# Patient Record
Sex: FEMALE | Race: WHITE | NOT HISPANIC OR LATINO | Employment: FULL TIME | URBAN - METROPOLITAN AREA
[De-identification: names, ages, dates, MRNs, and addresses within clinical notes are randomized per-mention and may not be internally consistent; named-entity substitution may affect disease eponyms.]

---

## 2018-06-27 ENCOUNTER — TRANSCRIBE ORDERS (OUTPATIENT)
Dept: ADMINISTRATIVE | Facility: HOSPITAL | Age: 38
End: 2018-06-27

## 2018-06-27 DIAGNOSIS — M54.5 LOW BACK PAIN, UNSPECIFIED BACK PAIN LATERALITY, UNSPECIFIED CHRONICITY, WITH SCIATICA PRESENCE UNSPECIFIED: Primary | ICD-10-CM

## 2018-07-02 ENCOUNTER — HOSPITAL ENCOUNTER (OUTPATIENT)
Dept: MRI IMAGING | Facility: HOSPITAL | Age: 38
Discharge: HOME | End: 2018-07-02
Admitting: EMERGENCY MEDICINE

## 2018-07-02 DIAGNOSIS — M54.5 LOW BACK PAIN, UNSPECIFIED BACK PAIN LATERALITY, UNSPECIFIED CHRONICITY, WITH SCIATICA PRESENCE UNSPECIFIED: ICD-10-CM

## 2018-07-02 PROCEDURE — 72148 MRI LUMBAR SPINE W/O DYE: CPT

## 2020-01-25 ENCOUNTER — APPOINTMENT (OUTPATIENT)
Dept: GENERAL RADIOLOGY | Facility: HOSPITAL | Age: 40
End: 2020-01-25

## 2020-01-25 ENCOUNTER — HOSPITAL ENCOUNTER (EMERGENCY)
Facility: HOSPITAL | Age: 40
Discharge: HOME | End: 2020-01-25
Attending: EMERGENCY MEDICINE | Admitting: EMERGENCY MEDICINE

## 2020-01-25 VITALS
SYSTOLIC BLOOD PRESSURE: 132 MMHG | HEART RATE: 69 BPM | WEIGHT: 293 LBS | HEIGHT: 64 IN | TEMPERATURE: 99.3 F | BODY MASS INDEX: 50.02 KG/M2 | DIASTOLIC BLOOD PRESSURE: 89 MMHG | RESPIRATION RATE: 17 BRPM | OXYGEN SATURATION: 99 %

## 2020-01-25 DIAGNOSIS — E03.9 HYPOTHYROIDISM, UNSPECIFIED TYPE: ICD-10-CM

## 2020-01-25 DIAGNOSIS — R00.2 HEART PALPITATIONS: Primary | ICD-10-CM

## 2020-01-25 LAB
ALBUMIN SERPL-MCNC: 4.1 G/DL (ref 3.5–5.2)
ALBUMIN/GLOB SERPL: 1.4 G/DL
ALP SERPL-CCNC: 91 U/L (ref 39–117)
ALT SERPL W P-5'-P-CCNC: 14 U/L (ref 1–33)
ANION GAP SERPL CALCULATED.3IONS-SCNC: 12 MMOL/L (ref 5–15)
AST SERPL-CCNC: 17 U/L (ref 1–32)
BASOPHILS # BLD AUTO: 0.08 10*3/MM3 (ref 0–0.2)
BASOPHILS NFR BLD AUTO: 0.8 % (ref 0–1.5)
BILIRUB SERPL-MCNC: 0.2 MG/DL (ref 0.2–1.2)
BUN BLD-MCNC: 11 MG/DL (ref 6–20)
BUN/CREAT SERPL: 18.3 (ref 7–25)
CALCIUM SPEC-SCNC: 9 MG/DL (ref 8.6–10.5)
CHLORIDE SERPL-SCNC: 104 MMOL/L (ref 98–107)
CO2 SERPL-SCNC: 23 MMOL/L (ref 22–29)
CREAT BLD-MCNC: 0.6 MG/DL (ref 0.57–1)
DEPRECATED RDW RBC AUTO: 50.8 FL (ref 37–54)
EOSINOPHIL # BLD AUTO: 0.12 10*3/MM3 (ref 0–0.4)
EOSINOPHIL NFR BLD AUTO: 1.1 % (ref 0.3–6.2)
ERYTHROCYTE [DISTWIDTH] IN BLOOD BY AUTOMATED COUNT: 14.6 % (ref 12.3–15.4)
GFR SERPL CREATININE-BSD FRML MDRD: 110 ML/MIN/1.73
GLOBULIN UR ELPH-MCNC: 2.9 GM/DL
GLUCOSE BLD-MCNC: 87 MG/DL (ref 65–99)
HCT VFR BLD AUTO: 39.7 % (ref 34–46.6)
HGB BLD-MCNC: 12.4 G/DL (ref 12–15.9)
HOLD SPECIMEN: NORMAL
HOLD SPECIMEN: NORMAL
IMM GRANULOCYTES # BLD AUTO: 0.04 10*3/MM3 (ref 0–0.05)
IMM GRANULOCYTES NFR BLD AUTO: 0.4 % (ref 0–0.5)
LYMPHOCYTES # BLD AUTO: 3.28 10*3/MM3 (ref 0.7–3.1)
LYMPHOCYTES NFR BLD AUTO: 31 % (ref 19.6–45.3)
MAGNESIUM SERPL-MCNC: 2.1 MG/DL (ref 1.6–2.6)
MCH RBC QN AUTO: 29.7 PG (ref 26.6–33)
MCHC RBC AUTO-ENTMCNC: 31.2 G/DL (ref 31.5–35.7)
MCV RBC AUTO: 95.2 FL (ref 79–97)
MONOCYTES # BLD AUTO: 0.75 10*3/MM3 (ref 0.1–0.9)
MONOCYTES NFR BLD AUTO: 7.1 % (ref 5–12)
NEUTROPHILS # BLD AUTO: 6.3 10*3/MM3 (ref 1.7–7)
NEUTROPHILS NFR BLD AUTO: 59.6 % (ref 42.7–76)
NRBC BLD AUTO-RTO: 0 /100 WBC (ref 0–0.2)
NT-PROBNP SERPL-MCNC: 46.2 PG/ML (ref 5–450)
PLATELET # BLD AUTO: 361 10*3/MM3 (ref 140–450)
PMV BLD AUTO: 9.9 FL (ref 6–12)
POTASSIUM BLD-SCNC: 4.4 MMOL/L (ref 3.5–5.2)
PROT SERPL-MCNC: 7 G/DL (ref 6–8.5)
RBC # BLD AUTO: 4.17 10*6/MM3 (ref 3.77–5.28)
SODIUM BLD-SCNC: 139 MMOL/L (ref 136–145)
T4 FREE SERPL-MCNC: 1.16 NG/DL (ref 0.93–1.7)
TROPONIN T SERPL-MCNC: NORMAL
TSH SERPL DL<=0.05 MIU/L-ACNC: 6.94 UIU/ML (ref 0.27–4.2)
WBC NRBC COR # BLD: 10.57 10*3/MM3 (ref 3.4–10.8)
WHOLE BLOOD HOLD SPECIMEN: NORMAL
WHOLE BLOOD HOLD SPECIMEN: NORMAL

## 2020-01-25 PROCEDURE — 71045 X-RAY EXAM CHEST 1 VIEW: CPT

## 2020-01-25 PROCEDURE — 93005 ELECTROCARDIOGRAM TRACING: CPT

## 2020-01-25 PROCEDURE — 93005 ELECTROCARDIOGRAM TRACING: CPT | Performed by: EMERGENCY MEDICINE

## 2020-01-25 PROCEDURE — 84443 ASSAY THYROID STIM HORMONE: CPT | Performed by: EMERGENCY MEDICINE

## 2020-01-25 PROCEDURE — 83880 ASSAY OF NATRIURETIC PEPTIDE: CPT | Performed by: EMERGENCY MEDICINE

## 2020-01-25 PROCEDURE — 83735 ASSAY OF MAGNESIUM: CPT | Performed by: EMERGENCY MEDICINE

## 2020-01-25 PROCEDURE — 99284 EMERGENCY DEPT VISIT MOD MDM: CPT

## 2020-01-25 PROCEDURE — 85025 COMPLETE CBC W/AUTO DIFF WBC: CPT | Performed by: EMERGENCY MEDICINE

## 2020-01-25 PROCEDURE — 80053 COMPREHEN METABOLIC PANEL: CPT | Performed by: EMERGENCY MEDICINE

## 2020-01-25 PROCEDURE — 84484 ASSAY OF TROPONIN QUANT: CPT | Performed by: EMERGENCY MEDICINE

## 2020-01-25 PROCEDURE — 84439 ASSAY OF FREE THYROXINE: CPT | Performed by: NURSE PRACTITIONER

## 2020-01-25 RX ORDER — SODIUM CHLORIDE 0.9 % (FLUSH) 0.9 %
10 SYRINGE (ML) INJECTION AS NEEDED
Status: DISCONTINUED | OUTPATIENT
Start: 2020-01-25 | End: 2020-01-25 | Stop reason: HOSPADM

## 2020-01-28 ENCOUNTER — HOSPITAL ENCOUNTER (OUTPATIENT)
Dept: CARDIOLOGY | Facility: HOSPITAL | Age: 40
Discharge: HOME | End: 2020-01-28
Admitting: NURSE PRACTITIONER

## 2020-01-28 ENCOUNTER — OFFICE VISIT (OUTPATIENT)
Dept: CARDIOLOGY | Facility: HOSPITAL | Age: 40
End: 2020-01-28

## 2020-01-28 VITALS
BODY MASS INDEX: 49.85 KG/M2 | TEMPERATURE: 97.9 F | DIASTOLIC BLOOD PRESSURE: 86 MMHG | WEIGHT: 292 LBS | HEART RATE: 85 BPM | HEIGHT: 64 IN | RESPIRATION RATE: 18 BRPM | SYSTOLIC BLOOD PRESSURE: 146 MMHG | OXYGEN SATURATION: 9 %

## 2020-01-28 DIAGNOSIS — R06.83 SNORING: ICD-10-CM

## 2020-01-28 DIAGNOSIS — R03.0 ELEVATED BLOOD PRESSURE READING: ICD-10-CM

## 2020-01-28 DIAGNOSIS — R00.2 PALPITATIONS: ICD-10-CM

## 2020-01-28 DIAGNOSIS — K21.9 GERD WITHOUT ESOPHAGITIS: ICD-10-CM

## 2020-01-28 DIAGNOSIS — E66.01 MORBID OBESITY WITH BMI OF 50.0-59.9, ADULT (HCC): ICD-10-CM

## 2020-01-28 DIAGNOSIS — E03.9 HYPOTHYROIDISM (ACQUIRED): ICD-10-CM

## 2020-01-28 DIAGNOSIS — M25.512 LEFT SHOULDER PAIN, UNSPECIFIED CHRONICITY: ICD-10-CM

## 2020-01-28 DIAGNOSIS — R06.09 DYSPNEA ON EXERTION: ICD-10-CM

## 2020-01-28 PROCEDURE — 99214 OFFICE O/P EST MOD 30 MIN: CPT | Performed by: NURSE PRACTITIONER

## 2020-01-28 PROCEDURE — 0296T HC EXT ECG > 48HR TO 21 DAY RCRD W/CONECT INTL RCRD: CPT

## 2020-01-28 RX ORDER — NAPROXEN 500 MG/1
500 TABLET ORAL 2 TIMES DAILY WITH MEALS
COMMUNITY

## 2020-01-28 NOTE — PROGRESS NOTES
[facility]  Heart and Valve Center      Encounter Date:01/28/2020     Delmi Glasgow  [address] [address]  [unfilled]    1980    Katie Hay MD    Delmi Glasgow is a 39 y.o. female.      Subjective:     Chief Complaint:  Palpitations (left shoulder pain, WYLIE) and Establish Care       HPI     39-year-old female presented to [facility] on 1/25/2020 with complaints of palpitations.   Intermittent palps for 4 months.  Duration seconds.   Patient also noted upper left back pain/shoulder blade, dyspnea, nausea.Worse with exertion.  On day of ED visit her palps has worsened with fatigue.   Past medical history includes hypothyroidism, depression, hyperlipidemia,  Morbid obesity with a BMI 50, GERD.  EKG with normal sinus rhythm.  Palpitations and shoulder pain has been worse with activity, increase dyspnea.  Reports had been of synthroid, but was restarted 3 months. Out of med for 1 week.  3-4 sweet tea.  Hx snoring.  Occasional morning HA.  Denies dizziness, syncope, near syncope. Occasional edema.  No hx of asthma.  Pt is adopted and unaware of family hx. Denies DM.  Blood pressure has been elevated recently    Patient Active Problem List    Diagnosis Date Noted   • Bilateral low back pain without sciatica 07/19/2016   • Hematuria 07/19/2016   • Back pain 07/19/2016   • Acquired hypothyroidism 06/07/2016   • Depression 06/07/2016   • Hyperlipemia 06/07/2016   • GERD without esophagitis 06/07/2016   • Thyroid nodule 06/07/2016   • Health care maintenance 06/07/2016         Past Surgical History:   Procedure Laterality Date   • DILATION AND CURETTAGE, DIAGNOSTIC / THERAPEUTIC         Allergies   Allergen Reactions   • Wellbutrin [Bupropion] Hives         Current Outpatient Medications:   •  levothyroxine (SYNTHROID) 88 MCG tablet, Take 1 tablet by mouth daily., Disp: 90 tablet, Rfl: 3  •  naproxen (NAPROSYN) 500 MG tablet, Take 500 mg by mouth 2 (Two) Times a Day With Meals., Disp: ,  "Rfl:     The following portions of the patient's history were reviewed and updated today during office visit as appropriate: allergies, current medications, past family history, past medical history, past social history, past surgical history and problem list.    Review of Systems   Cardiovascular: Positive for palpitations.   All other systems reviewed and are negative.      Objective:     Vitals:    01/28/20 1440 01/28/20 1442 01/28/20 1443   BP: 139/80 144/95 146/86   BP Location: Left arm Right arm Left arm   Patient Position: Sitting Sitting Standing   Cuff Size: Large Adult Large Adult Large Adult   Pulse: 79  85   Resp: 18     Temp: 97.9 °F (36.6 °C)     TempSrc: Temporal     SpO2: 98%  (!) 9%   Weight: 132 kg (292 lb)     Height: 162.6 cm (64\")           Physical Exam   Constitutional: She is oriented to person, place, and time. She appears well-developed and well-nourished. No distress.   HENT:   Mouth/Throat: Oropharynx is clear and moist.   Eyes: No scleral icterus.   Neck: No hepatojugular reflux and no JVD present. Carotid bruit is not present. No tracheal deviation present. No thyromegaly present.   Cardiovascular: Normal rate, regular rhythm, normal heart sounds and intact distal pulses. Exam reveals no friction rub.   No murmur heard.  Pulmonary/Chest: Effort normal and breath sounds normal.   Abdominal: Soft. Bowel sounds are normal. She exhibits no distension. There is no tenderness.   Musculoskeletal: She exhibits no edema.   Lymphadenopathy:     She has no cervical adenopathy.   Neurological: She is alert and oriented to person, place, and time.   Skin: Skin is warm, dry and intact. No rash noted. No cyanosis or erythema. No pallor.   Psychiatric: She has a normal mood and affect. Her behavior is normal. Thought content normal.   Vitals reviewed.      Lab and Diagnostic Review:  Admission on 01/25/2020, Discharged on 01/25/2020   Component Date Value Ref Range Status   • Glucose 01/25/2020 87  65 " - 99 mg/dL Final   • BUN 01/25/2020 11  6 - 20 mg/dL Final   • Creatinine 01/25/2020 0.60  0.57 - 1.00 mg/dL Final   • Sodium 01/25/2020 139  136 - 145 mmol/L Final   • Potassium 01/25/2020 4.4  3.5 - 5.2 mmol/L Final    Specimen hemolyzed.  Results may be affected.   • Chloride 01/25/2020 104  98 - 107 mmol/L Final   • CO2 01/25/2020 23.0  22.0 - 29.0 mmol/L Final   • Calcium 01/25/2020 9.0  8.6 - 10.5 mg/dL Final   • Total Protein 01/25/2020 7.0  6.0 - 8.5 g/dL Final   • Albumin 01/25/2020 4.10  3.50 - 5.20 g/dL Final   • ALT (SGPT) 01/25/2020 14  1 - 33 U/L Final    Specimen hemolyzed.  Results may be affected.   • AST (SGOT) 01/25/2020 17  1 - 32 U/L Final   • Alkaline Phosphatase 01/25/2020 91  39 - 117 U/L Final   • Total Bilirubin 01/25/2020 0.2  0.2 - 1.2 mg/dL Final   • eGFR Non African Amer 01/25/2020 110  >60 mL/min/1.73 Final   • Globulin 01/25/2020 2.9  gm/dL Final   • A/G Ratio 01/25/2020 1.4  g/dL Final   • BUN/Creatinine Ratio 01/25/2020 18.3  7.0 - 25.0 Final   • Anion Gap 01/25/2020 12.0  5.0 - 15.0 mmol/L Final   • Magnesium 01/25/2020 2.1  1.6 - 2.6 mg/dL Final   • Troponin T 01/25/2020    Final    Hemolyzed specimen. Testing performed per physician request.    • TSH 01/25/2020 6.940* 0.270 - 4.200 uIU/mL Final   • proBNP 01/25/2020 46.2  5.0 - 450.0 pg/mL Final   • Extra Tube 01/25/2020 hold for add-on   Final    Auto resulted   • Extra Tube 01/25/2020 Hold for add-ons.   Final    Auto resulted.   • Extra Tube 01/25/2020 hold for add-on   Final    Auto resulted   • Extra Tube 01/25/2020 Hold for add-ons.   Final    Auto resulted.   • WBC 01/25/2020 10.57  3.40 - 10.80 10*3/mm3 Final   • RBC 01/25/2020 4.17  3.77 - 5.28 10*6/mm3 Final   • Hemoglobin 01/25/2020 12.4  12.0 - 15.9 g/dL Final   • Hematocrit 01/25/2020 39.7  34.0 - 46.6 % Final   • MCV 01/25/2020 95.2  79.0 - 97.0 fL Final   • MCH 01/25/2020 29.7  26.6 - 33.0 pg Final   • MCHC 01/25/2020 31.2* 31.5 - 35.7 g/dL Final   • RDW 01/25/2020  14.6  12.3 - 15.4 % Final   • RDW-SD 01/25/2020 50.8  37.0 - 54.0 fl Final   • MPV 01/25/2020 9.9  6.0 - 12.0 fL Final   • Platelets 01/25/2020 361  140 - 450 10*3/mm3 Final   • Neutrophil % 01/25/2020 59.6  42.7 - 76.0 % Final   • Lymphocyte % 01/25/2020 31.0  19.6 - 45.3 % Final   • Monocyte % 01/25/2020 7.1  5.0 - 12.0 % Final   • Eosinophil % 01/25/2020 1.1  0.3 - 6.2 % Final   • Basophil % 01/25/2020 0.8  0.0 - 1.5 % Final   • Immature Grans % 01/25/2020 0.4  0.0 - 0.5 % Final   • Neutrophils, Absolute 01/25/2020 6.30  1.70 - 7.00 10*3/mm3 Final   • Lymphocytes, Absolute 01/25/2020 3.28* 0.70 - 3.10 10*3/mm3 Final   • Monocytes, Absolute 01/25/2020 0.75  0.10 - 0.90 10*3/mm3 Final   • Eosinophils, Absolute 01/25/2020 0.12  0.00 - 0.40 10*3/mm3 Final   • Basophils, Absolute 01/25/2020 0.08  0.00 - 0.20 10*3/mm3 Final   • Immature Grans, Absolute 01/25/2020 0.04  0.00 - 0.05 10*3/mm3 Final   • nRBC 01/25/2020 0.0  0.0 - 0.2 /100 WBC Final   • Free T4 01/25/2020 1.16  0.93 - 1.70 ng/dL Final     EKG 1/25/2020: Normal sinus rhythm 77 bpm    Chest x-ray 1/25/2020: Borderline cardiomegaly, trace central vascular congestion.    Lab Results   Component Value Date    CHOL 211 (H) 06/27/2016     Lab Results   Component Value Date    TRIG 85 06/27/2016     Lab Results   Component Value Date    HDL 64 (H) 06/27/2016     Lab Results   Component Value Date     06/27/2016       Assessment and Plan:         1. Dyspnea on exertion    - Stress Test With Pet Myocardial Perfusion (Multi Study); BMI 50    2. Left shoulder pain, unspecified chronicity  Worse with exertion. Associated   - Stress Test With Pet Myocardial Perfusion (Multi Study); Future    3. Palpitations    - Holter Monitor - 72 Hour Up To 21 Days; 14 day preventice mini  - Ambulatory Referral to Sleep Medicine    4. Hypothyroidism (acquired)  F/u with PCP for management and refills of meds  Reviewed recent TSH    5. GERD without esophagitis  Reports has been  well controlled    6. Morbid obesity with BMI of 50.0-59.9, adult (CMS/HCC)    - Stress Test With Pet Myocardial Perfusion (Multi Study); Future    Discussed diet and exercise modifications for weight loss    7. Snoring    - Ambulatory Referral to Sleep Medicine    8. Elevated blood pressure reading   continue to monitor.    6 weeks.         *Please note that portions of this note were completed with a voice recognition program. Efforts were made to edit the dictations, but occasionally words are mistranscribed.

## 2020-01-28 NOTE — PROGRESS NOTES
[facility] Heart Monitor Documentation    Delmi Glasgow  1980  [id number]  01/28/20    MALIKA Maurice    [] ZIO XT Patch  Model [id number] Prescribed for  Days    · Serial Number: (N + 9 Digits) N   · Apply-By Date on Box:   · USPS Tracking Number:   · USPS Tracking        [] Preventice BodyGuardian MINI PLUS Mobile Cardiac Telemetry  Model BGMINIPLUS Prescribed for  Days    · Serial Number: (BGM + 7 Digits) BGM  · Shipped-By Date on Box:   · UPS Tracking Number: [id number]  · UPS Tracking      [x] Preventice BodyGuardian MINI Holter Monitor  Model BGMINIEL Prescribed for 14 Days    · Serial Number: (7 Digits)  [id number]  · Shipped-By Date on Box:  01/23/2020  · UPS Tracking Number: [id number]  · UPS Tracking        This monitor was applied to the patient's chest and checked for proper functioning.  Ms. Delmi Glasgow was instructed in the proper use of this monitor.  She was given the opportunity to ask questions and left the office with the device 's instruction manual.    Gala Hsu CMA, 3:32 PM, 01/28/20                  [facility]

## 2020-01-29 ENCOUNTER — TELEPHONE (OUTPATIENT)
Dept: CARDIOLOGY | Facility: HOSPITAL | Age: 40
End: 2020-01-29

## 2020-01-29 ENCOUNTER — HOSPITAL ENCOUNTER (OUTPATIENT)
Dept: CARDIOLOGY | Facility: HOSPITAL | Age: 40
Discharge: HOME | End: 2020-01-29
Admitting: NURSE PRACTITIONER

## 2020-01-29 VITALS
HEIGHT: 64 IN | HEART RATE: 84 BPM | SYSTOLIC BLOOD PRESSURE: 131 MMHG | RESPIRATION RATE: 20 BRPM | WEIGHT: 291.01 LBS | BODY MASS INDEX: 49.68 KG/M2 | OXYGEN SATURATION: 100 % | DIASTOLIC BLOOD PRESSURE: 83 MMHG

## 2020-01-29 DIAGNOSIS — M25.512 LEFT SHOULDER PAIN, UNSPECIFIED CHRONICITY: ICD-10-CM

## 2020-01-29 DIAGNOSIS — R06.09 DYSPNEA ON EXERTION: ICD-10-CM

## 2020-01-29 DIAGNOSIS — E66.01 MORBID OBESITY WITH BMI OF 50.0-59.9, ADULT (HCC): ICD-10-CM

## 2020-01-29 PROBLEM — R00.2 PALPITATIONS: Status: ACTIVE | Noted: 2020-01-29

## 2020-01-29 LAB
BH CV STRESS BP STAGE 1: NORMAL
BH CV STRESS BP STAGE 3: NORMAL
BH CV STRESS COMMENTS STAGE 1: NORMAL
BH CV STRESS DOSE REGADENOSON STAGE 1: 0.4
BH CV STRESS DURATION MIN STAGE 1: 1
BH CV STRESS DURATION MIN STAGE 2: 1
BH CV STRESS DURATION MIN STAGE 3: 1
BH CV STRESS DURATION MIN STAGE 4: 1
BH CV STRESS DURATION SEC STAGE 1: 0
BH CV STRESS DURATION SEC STAGE 2: 0
BH CV STRESS DURATION SEC STAGE 3: 0
BH CV STRESS DURATION SEC STAGE 4: 0
BH CV STRESS HR STAGE 1: 134
BH CV STRESS HR STAGE 2: 114
BH CV STRESS HR STAGE 3: 104
BH CV STRESS HR STAGE 4: 100
BH CV STRESS O2 STAGE 1: 100
BH CV STRESS O2 STAGE 2: 100
BH CV STRESS O2 STAGE 3: 99
BH CV STRESS O2 STAGE 4: 98
BH CV STRESS PROTOCOL 1: NORMAL
BH CV STRESS RECOVERY BP: NORMAL MMHG
BH CV STRESS RECOVERY HR: 93 BPM
BH CV STRESS RECOVERY O2: 98 %
BH CV STRESS STAGE 1: 1
BH CV STRESS STAGE 2: 2
BH CV STRESS STAGE 3: 3
BH CV STRESS STAGE 4: 4
LV EF NUC BP: 59 %
MAXIMAL PREDICTED HEART RATE: 179 BPM
PERCENT MAX PREDICTED HR: 74.86 %
STRESS BASELINE BP: NORMAL MMHG
STRESS BASELINE HR: 88 BPM
STRESS O2 SAT REST: 100 %
STRESS PERCENT HR: 88 %
STRESS POST ESTIMATED WORKLOAD: 1 METS
STRESS POST EXERCISE DUR MIN: 4 MIN
STRESS POST EXERCISE DUR SEC: 0 SEC
STRESS POST O2 SAT PEAK: 100 %
STRESS POST PEAK BP: NORMAL MMHG
STRESS POST PEAK HR: 134 BPM
STRESS TARGET HR: 152 BPM

## 2020-01-29 PROCEDURE — 93017 CV STRESS TEST TRACING ONLY: CPT

## 2020-01-29 PROCEDURE — 93018 CV STRESS TEST I&R ONLY: CPT | Performed by: INTERNAL MEDICINE

## 2020-01-29 PROCEDURE — A9555 RB82 RUBIDIUM: HCPCS | Performed by: NURSE PRACTITIONER

## 2020-01-29 PROCEDURE — 0 RUBIDIUM CHLORIDE: Performed by: NURSE PRACTITIONER

## 2020-01-29 PROCEDURE — 78492 MYOCRD IMG PET MLT RST&STRS: CPT

## 2020-01-29 PROCEDURE — 78492 MYOCRD IMG PET MLT RST&STRS: CPT | Performed by: INTERNAL MEDICINE

## 2020-01-29 PROCEDURE — 25010000002 REGADENOSON 0.4 MG/5ML SOLUTION: Performed by: NURSE PRACTITIONER

## 2020-01-29 RX ADMIN — RUBIDIUM CHLORIDE RB-82 1 DOSE: 150 INJECTION, SOLUTION INTRAVENOUS at 09:55

## 2020-01-29 RX ADMIN — REGADENOSON 0.4 MG: 0.08 INJECTION, SOLUTION INTRAVENOUS at 10:08

## 2020-01-29 RX ADMIN — RUBIDIUM CHLORIDE RB-82 1 DOSE: 150 INJECTION, SOLUTION INTRAVENOUS at 10:10

## 2020-01-29 NOTE — TELEPHONE ENCOUNTER
Called and reviewed cardiac stress PET.  No ischemia, normal EF.    Patient to follow-up as scheduled or sooner if needed

## 2020-02-19 PROCEDURE — 0298T HOLTER MONITOR - 72 HOUR UP TO 21 DAY: CPT | Performed by: INTERNAL MEDICINE

## 2023-07-27 ENCOUNTER — APPOINTMENT (OUTPATIENT)
Dept: CT IMAGING | Facility: HOSPITAL | Age: 43
End: 2023-07-27
Payer: COMMERCIAL

## 2023-07-27 ENCOUNTER — APPOINTMENT (OUTPATIENT)
Dept: GENERAL RADIOLOGY | Facility: HOSPITAL | Age: 43
End: 2023-07-27
Payer: COMMERCIAL

## 2023-07-27 ENCOUNTER — HOSPITAL ENCOUNTER (EMERGENCY)
Facility: HOSPITAL | Age: 43
Discharge: HOME | End: 2023-07-27
Attending: EMERGENCY MEDICINE
Payer: COMMERCIAL

## 2023-07-27 VITALS
BODY MASS INDEX: 44.3 KG/M2 | WEIGHT: 250 LBS | HEIGHT: 63 IN | OXYGEN SATURATION: 95 % | DIASTOLIC BLOOD PRESSURE: 74 MMHG | HEART RATE: 66 BPM | RESPIRATION RATE: 20 BRPM | SYSTOLIC BLOOD PRESSURE: 135 MMHG | TEMPERATURE: 98.4 F

## 2023-07-27 DIAGNOSIS — R07.9 CHEST PAIN, UNSPECIFIED TYPE: Primary | ICD-10-CM

## 2023-07-27 LAB
ALBUMIN SERPL-MCNC: 3.9 G/DL (ref 3.5–5.2)
ALBUMIN/GLOB SERPL: 1.9 G/DL
ALP SERPL-CCNC: 73 U/L (ref 39–117)
ALT SERPL W P-5'-P-CCNC: 26 U/L (ref 1–33)
ANION GAP SERPL CALCULATED.3IONS-SCNC: 13 MMOL/L (ref 5–15)
AST SERPL-CCNC: 24 U/L (ref 1–32)
BASOPHILS # BLD AUTO: 0.06 10*3/MM3 (ref 0–0.2)
BASOPHILS NFR BLD AUTO: 0.8 % (ref 0–1.5)
BILIRUB SERPL-MCNC: 0.2 MG/DL (ref 0–1.2)
BUN SERPL-MCNC: 8 MG/DL (ref 6–20)
BUN/CREAT SERPL: 12.9 (ref 7–25)
CALCIUM SPEC-SCNC: 9.4 MG/DL (ref 8.6–10.5)
CHLORIDE SERPL-SCNC: 106 MMOL/L (ref 98–107)
CO2 SERPL-SCNC: 23 MMOL/L (ref 22–29)
CREAT SERPL-MCNC: 0.62 MG/DL (ref 0.57–1)
DEPRECATED RDW RBC AUTO: 43.8 FL (ref 37–54)
EGFRCR SERPLBLD CKD-EPI 2021: 112.1 ML/MIN/1.73
EOSINOPHIL # BLD AUTO: 0.3 10*3/MM3 (ref 0–0.4)
EOSINOPHIL NFR BLD AUTO: 4.2 % (ref 0.3–6.2)
ERYTHROCYTE [DISTWIDTH] IN BLOOD BY AUTOMATED COUNT: 13 % (ref 12.3–15.4)
GEN 5 2HR TROPONIN T REFLEX: 11 NG/L
GLOBULIN UR ELPH-MCNC: 2.1 GM/DL
GLUCOSE SERPL-MCNC: 80 MG/DL (ref 65–99)
HCT VFR BLD AUTO: 39.1 % (ref 34–46.6)
HGB BLD-MCNC: 12.7 G/DL (ref 12–15.9)
HOLD SPECIMEN: NORMAL
IMM GRANULOCYTES # BLD AUTO: 0.03 10*3/MM3 (ref 0–0.05)
IMM GRANULOCYTES NFR BLD AUTO: 0.4 % (ref 0–0.5)
LIPASE SERPL-CCNC: 28 U/L (ref 13–60)
LYMPHOCYTES # BLD AUTO: 2.44 10*3/MM3 (ref 0.7–3.1)
LYMPHOCYTES NFR BLD AUTO: 34.1 % (ref 19.6–45.3)
MCH RBC QN AUTO: 30 PG (ref 26.6–33)
MCHC RBC AUTO-ENTMCNC: 32.5 G/DL (ref 31.5–35.7)
MCV RBC AUTO: 92.2 FL (ref 79–97)
MONOCYTES # BLD AUTO: 0.49 10*3/MM3 (ref 0.1–0.9)
MONOCYTES NFR BLD AUTO: 6.8 % (ref 5–12)
NEUTROPHILS NFR BLD AUTO: 3.84 10*3/MM3 (ref 1.7–7)
NEUTROPHILS NFR BLD AUTO: 53.7 % (ref 42.7–76)
NRBC BLD AUTO-RTO: 0 /100 WBC (ref 0–0.2)
NT-PROBNP SERPL-MCNC: 80.8 PG/ML (ref 0–450)
PLATELET # BLD AUTO: 406 10*3/MM3 (ref 140–450)
PMV BLD AUTO: 9.4 FL (ref 6–12)
POTASSIUM SERPL-SCNC: 4.1 MMOL/L (ref 3.5–5.2)
PROT SERPL-MCNC: 6 G/DL (ref 6–8.5)
RBC # BLD AUTO: 4.24 10*6/MM3 (ref 3.77–5.28)
SODIUM SERPL-SCNC: 142 MMOL/L (ref 136–145)
TROPONIN T DELTA: 2 NG/L
TROPONIN T SERPL HS-MCNC: 9 NG/L
WBC NRBC COR # BLD: 7.16 10*3/MM3 (ref 3.4–10.8)
WHOLE BLOOD HOLD COAG: NORMAL
WHOLE BLOOD HOLD SPECIMEN: NORMAL

## 2023-07-27 PROCEDURE — 83880 ASSAY OF NATRIURETIC PEPTIDE: CPT | Performed by: EMERGENCY MEDICINE

## 2023-07-27 PROCEDURE — 83690 ASSAY OF LIPASE: CPT | Performed by: EMERGENCY MEDICINE

## 2023-07-27 PROCEDURE — 71275 CT ANGIOGRAPHY CHEST: CPT

## 2023-07-27 PROCEDURE — 99284 EMERGENCY DEPT VISIT MOD MDM: CPT

## 2023-07-27 PROCEDURE — 96375 TX/PRO/DX INJ NEW DRUG ADDON: CPT

## 2023-07-27 PROCEDURE — 36415 COLL VENOUS BLD VENIPUNCTURE: CPT

## 2023-07-27 PROCEDURE — 25010000002 KETOROLAC TROMETHAMINE PER 15 MG: Performed by: EMERGENCY MEDICINE

## 2023-07-27 PROCEDURE — 85025 COMPLETE CBC W/AUTO DIFF WBC: CPT | Performed by: EMERGENCY MEDICINE

## 2023-07-27 PROCEDURE — 80053 COMPREHEN METABOLIC PANEL: CPT | Performed by: EMERGENCY MEDICINE

## 2023-07-27 PROCEDURE — 93005 ELECTROCARDIOGRAM TRACING: CPT

## 2023-07-27 PROCEDURE — 96374 THER/PROPH/DIAG INJ IV PUSH: CPT

## 2023-07-27 PROCEDURE — 84484 ASSAY OF TROPONIN QUANT: CPT | Performed by: EMERGENCY MEDICINE

## 2023-07-27 PROCEDURE — 93005 ELECTROCARDIOGRAM TRACING: CPT | Performed by: EMERGENCY MEDICINE

## 2023-07-27 PROCEDURE — 71045 X-RAY EXAM CHEST 1 VIEW: CPT

## 2023-07-27 PROCEDURE — 25010000002 DICYCLOMINE PER 20 MG: Performed by: EMERGENCY MEDICINE

## 2023-07-27 PROCEDURE — 96372 THER/PROPH/DIAG INJ SC/IM: CPT

## 2023-07-27 PROCEDURE — 25510000001 IOPAMIDOL PER 1 ML: Performed by: EMERGENCY MEDICINE

## 2023-07-27 RX ORDER — NITROGLYCERIN 0.4 MG/1
0.4 TABLET SUBLINGUAL
Status: DISCONTINUED | OUTPATIENT
Start: 2023-07-27 | End: 2023-07-27 | Stop reason: HOSPADM

## 2023-07-27 RX ORDER — LISINOPRIL 2.5 MG/1
2.5 TABLET ORAL DAILY
COMMUNITY

## 2023-07-27 RX ORDER — ASPIRIN 81 MG/1
324 TABLET, CHEWABLE ORAL ONCE
Status: DISCONTINUED | OUTPATIENT
Start: 2023-07-27 | End: 2023-07-27

## 2023-07-27 RX ORDER — SODIUM CHLORIDE 0.9 % (FLUSH) 0.9 %
10 SYRINGE (ML) INJECTION AS NEEDED
Status: DISCONTINUED | OUTPATIENT
Start: 2023-07-27 | End: 2023-07-27 | Stop reason: HOSPADM

## 2023-07-27 RX ORDER — NITROGLYCERIN 0.4 MG/1
0.4 TABLET SUBLINGUAL
Status: DISCONTINUED | OUTPATIENT
Start: 2023-07-27 | End: 2023-07-27

## 2023-07-27 RX ORDER — FAMOTIDINE 10 MG/ML
20 INJECTION, SOLUTION INTRAVENOUS ONCE
Status: COMPLETED | OUTPATIENT
Start: 2023-07-27 | End: 2023-07-27

## 2023-07-27 RX ORDER — KETOROLAC TROMETHAMINE 15 MG/ML
15 INJECTION, SOLUTION INTRAMUSCULAR; INTRAVENOUS ONCE
Status: COMPLETED | OUTPATIENT
Start: 2023-07-27 | End: 2023-07-27

## 2023-07-27 RX ORDER — NITROGLYCERIN 0.4 MG/1
0.4 TABLET SUBLINGUAL
Qty: 100 TABLET | Refills: 0 | Status: SHIPPED | OUTPATIENT
Start: 2023-07-27

## 2023-07-27 RX ORDER — ARIPIPRAZOLE 5 MG/1
5 TABLET ORAL DAILY
COMMUNITY

## 2023-07-27 RX ORDER — DICYCLOMINE HYDROCHLORIDE 10 MG/ML
20 INJECTION INTRAMUSCULAR ONCE
Status: COMPLETED | OUTPATIENT
Start: 2023-07-27 | End: 2023-07-27

## 2023-07-27 RX ORDER — LIDOCAINE HYDROCHLORIDE 20 MG/ML
15 SOLUTION OROPHARYNGEAL ONCE
Status: DISCONTINUED | OUTPATIENT
Start: 2023-07-27 | End: 2023-07-27

## 2023-07-27 RX ORDER — ALUMINA, MAGNESIA, AND SIMETHICONE 2400; 2400; 240 MG/30ML; MG/30ML; MG/30ML
15 SUSPENSION ORAL ONCE
Status: COMPLETED | OUTPATIENT
Start: 2023-07-27 | End: 2023-07-27

## 2023-07-27 RX ADMIN — NITROGLYCERIN 0.4 MG: 0.4 TABLET SUBLINGUAL at 18:28

## 2023-07-27 RX ADMIN — KETOROLAC TROMETHAMINE 15 MG: 15 INJECTION, SOLUTION INTRAMUSCULAR; INTRAVENOUS at 15:48

## 2023-07-27 RX ADMIN — DICYCLOMINE HYDROCHLORIDE 20 MG: 10 INJECTION, SOLUTION INTRAMUSCULAR at 12:34

## 2023-07-27 RX ADMIN — IOPAMIDOL 85 ML: 755 INJECTION, SOLUTION INTRAVENOUS at 19:32

## 2023-07-27 RX ADMIN — ALUMINUM HYDROXIDE, MAGNESIUM HYDROXIDE, AND DIMETHICONE 15 ML: 400; 400; 40 SUSPENSION ORAL at 12:37

## 2023-07-27 RX ADMIN — FAMOTIDINE 20 MG: 10 INJECTION INTRAVENOUS at 12:35

## 2023-07-27 NOTE — ED PROVIDER NOTES
"Subjective   History of Present Illness    Pt presents with chest pain. Onset this morning of midsternal chest pain radiating to both armpits.  Still present.  Some dizziness as well but says \"but I stay dizzy all the time.\"  She also complains of a fall this morning without injury, again says that \"I just do that, my legs just give out\" and this is not a new occurrence.      She was seen at  last week for chest pain, sounds like a negative workup.  She says they told her it was stress but she does not feel stressed.    She has no known history of heart or lung disease.  She says she had a stress test a few months ago.    No fever, cough, vomiting.    PMH HTN, HL, thyroid disease, bipolar.    History provided by:  Patient    Review of Systems   Constitutional:  Negative for fever.   Respiratory:  Negative for cough and shortness of breath.    Cardiovascular:  Positive for chest pain.   Gastrointestinal:  Negative for vomiting.   Neurological:  Positive for dizziness.   All other systems reviewed and are negative.    Past Medical History:   Diagnosis Date    Depression     Hyperlipemia        Allergies   Allergen Reactions    Wellbutrin [Bupropion] Hives       Past Surgical History:   Procedure Laterality Date    DILATION AND CURETTAGE, DIAGNOSTIC / THERAPEUTIC         Family History   Adopted: Yes   Problem Relation Age of Onset    No Known Problems Mother     No Known Problems Father     No Known Problems Maternal Grandmother     No Known Problems Maternal Grandfather     No Known Problems Paternal Grandmother     No Known Problems Paternal Grandfather        Social History     Socioeconomic History    Marital status:    Tobacco Use    Smoking status: Never    Smokeless tobacco: Never   Substance and Sexual Activity    Alcohol use: No    Drug use: No    Sexual activity: Yes     Partners: Male     Birth control/protection: Surgical           Objective   Physical Exam  Vitals and nursing note reviewed. "   Constitutional:       General: She is not in acute distress.     Appearance: Normal appearance. She is not ill-appearing.   HENT:      Head: Normocephalic and atraumatic.      Mouth/Throat:      Mouth: Mucous membranes are moist.   Eyes:      General: No scleral icterus.        Right eye: No discharge.         Left eye: No discharge.      Conjunctiva/sclera: Conjunctivae normal.   Cardiovascular:      Rate and Rhythm: Normal rate and regular rhythm.      Heart sounds: No murmur heard.  Pulmonary:      Effort: Pulmonary effort is normal. No respiratory distress.      Breath sounds: Normal breath sounds. No wheezing.   Abdominal:      General: Bowel sounds are normal. There is no distension.      Palpations: Abdomen is soft.      Tenderness: There is no abdominal tenderness. There is no guarding or rebound.   Musculoskeletal:         General: No swelling. Normal range of motion.      Cervical back: Normal range of motion and neck supple.   Skin:     General: Skin is warm and dry.      Findings: No rash.   Neurological:      General: No focal deficit present.      Mental Status: She is alert and oriented to person, place, and time. Mental status is at baseline.   Psychiatric:         Mood and Affect: Mood normal.         Behavior: Behavior normal.         Thought Content: Thought content normal.       Procedures           ED Course    Reviewed [facility] visit last week, negative cardiac workup at that time.  I reviewed her prior records and she had a negative SPECT stress test in April 2023.    EKG NSR, nonspecific changes.      HEART = 4     Labs benign.  CXR NAD.  CTA negative.    No relief GI meds or Toradol but improved with nitroglycerin.    Uncertain clinical picture.  Recent negative stress but no alternative dx is apparent.  Feeling better, two negative cardiac sets, I think outpatient eval is appropriate.    Patient stable on serial rechecks.  Discussed findings, concerns, plan of care, expected course, reasons to  return and followup.  Provided the opportunity to ask questions.  Referred to [facility] clinic.                                    Medical Decision Making  Problems Addressed:  Chest pain, unspecified type: complicated acute illness or injury    Amount and/or Complexity of Data Reviewed  External Data Reviewed: notes.  Labs: ordered. Decision-making details documented in ED Course.  Radiology: ordered. Decision-making details documented in ED Course.     Details: my read CXR: NAD  ECG/medicine tests: ordered and independent interpretation performed. Decision-making details documented in ED Course.     Details: my read EKG: NSR with nonspecific ST changes    Risk  Prescription drug management.  Decision regarding hospitalization.        Final diagnoses:   Chest pain, unspecified type       ED Disposition  ED Disposition       ED Disposition   Discharge    Condition   Stable    Comment   --               [facility] CARDIOLOGY  [address]  [address]  [place] [place] [address] [phone]  Schedule an appointment as soon as possible for a visit   they will call you tomorrow         Medication List        New Prescriptions      nitroglycerin 0.4 MG SL tablet  Commonly known as: NITROSTAT  Place 1 tablet under the tongue Every 5 (Five) Minutes As Needed for Chest Pain. Take no more than 3 doses in 15 minutes.            Changed      levothyroxine 88 MCG tablet  Commonly known as: Synthroid  Take 1 tablet by mouth daily.  What changed: how much to take               Where to Get Your Medications        These medications were sent to [facility] [id number] - [place], [place] - [address]  [phone]  - [phone]  [address] [place] [address]      Phone: [phone]   nitroglycerin 0.4 MG SL tablet            Farhan Elizalde MD  07/27/23 0846

## 2023-07-28 LAB
QT INTERVAL: 400 MS
QT INTERVAL: 424 MS
QTC INTERVAL: 452 MS
QTC INTERVAL: 467 MS

## 2023-08-07 NOTE — PROGRESS NOTES
Chief Complaint  Establish Care and Chest Pain    Subjective      History of Present Illness {CC  Problem List  Visit  Diagnosis   Encounters  Notes  Medications  Labs  Result Review Imaging  Media :23}     Delmi Glasgow, 43 y.o. female with past medical history significant for hypertension, HFrEF, pituitary macroadenoma status postresection and radiation, hyperlipidemia, depression, and thyroid disease, who presents to [facility] Heart and Valve clinic for Establish Care and Chest Pain  The patient presented to the ED on 7/27/2023 with chief complaint of chest pain.  At that time, the patient stated she had midsternal chest pain with radiation to both armpits.  She reported associated dizziness and recent falls.  Of note she was seen at  the week prior also for chest pain.  While in [facility] ER, patient's high-sensitivity troponin initially 9 with repeat of 11.  CBC, CMP, BNP, and lipase all noted to be within normal limits.  EKG showed normal sinus rhythm, rate 73, voltage criteria for LVH, nonspecific ST and T wave abnormality, prolonged QT.  CTA of chest showed no vascular filling defect to suggest PE, hazy ill-defined bilateral infiltrates, and cholelithiasis.     Of note, patient was recently diagnosed with HFrEF while at .  She was initiated on GDMT after new diagnosis but she became hypotensive and required readmission.  Most recently, patient has been on low-dose lisinopril with other medications discontinued due to recent hypotension.    Since eval in our ED, chest pain has improved. Pain is described as squeezing under bilateral arms (in armpit area). Occurring a couple times a week. No other radiation. Lasting a couple hours. She endorses shortness of air which is not new for patient. She is on home O2 at 2L at night and as needed which has been more frequently recently. She endorses mild lower extremity edema and recent weight gain of approximately 6 pounds. She denies syncope or near  "syncope but does have frequent falls due to balance.  She follows up with [facility] as a outpatient for balance.  She denies palpitations. Endorses fatigue.  Patient admits she is not adequately hydrated. She does endorse orthopnea at night.     Blood pressure at home is running similar to today while in office.  Approximately 120s over 70s.    She drinks 1 can of soda daily  No known family history of cardiac disease    SPECT stress test 4/12/2023:  This result has an attachment that is not available.     Baseline ECG: The baseline ECG shows normal sinus rhythm, normal axis,   nonspecific ST-T wave abnormality and T wave inversion in inferolateral   leads. Baseline ECG shows non-specific ST segment deviation.     Stress ECG: No ischemic ST segment changes occurred with stress.     Perfusion: SPECT images demonstrate normal myocardial perfusion.     Function: Normal left ventricular cavity size. Gated SPECT images   demonstrate low-normal systolic function. Regional wall motion is normal.   LV wall thickening appears concordantly normal.     LVEF: 45 - 49%.     Combined ECG/SPECT: This is a probably normal nuclear stress test.   There is no previous examination/report available for comparison or   correlation.       Objective     Vital Signs:   Vitals:    08/09/23 1456 08/09/23 1458 08/09/23 1500   BP: 121/77 122/79 114/74   BP Location: Right arm Left arm Left arm   Patient Position: Sitting Standing Sitting   Cuff Size: Adult Adult Adult   Pulse: 80 90 82   Resp:   20   Temp:   99.2 øF (37.3 øC)   TempSrc:   Temporal   SpO2: 98% 97% 98%   Weight:   117 kg (258 lb 14 oz)   Height:   160 cm (63\")     Body mass index is 45.86 kg/mý.  Physical Exam  Vitals and nursing note reviewed.   Constitutional:       Appearance: Normal appearance. She is obese.   HENT:      Head: Normocephalic.   Eyes:      Pupils: Pupils are equal, round, and reactive to light.   Cardiovascular:      Rate and Rhythm: Normal rate and " regular rhythm.      Pulses: Normal pulses.      Heart sounds: Normal heart sounds. No murmur heard.  Pulmonary:      Effort: Pulmonary effort is normal.      Breath sounds: Normal breath sounds.   Abdominal:      General: Bowel sounds are normal.      Palpations: Abdomen is soft.   Musculoskeletal:         General: Normal range of motion.      Cervical back: Normal range of motion.      Right lower le+ Edema present.      Left lower le+ Edema present.   Skin:     General: Skin is warm and dry.      Capillary Refill: Capillary refill takes less than 2 seconds.   Neurological:      Mental Status: She is alert and oriented to person, place, and time.   Psychiatric:         Mood and Affect: Mood normal.         Thought Content: Thought content normal.              Data Reviewed:{ Labs  Result Review  Imaging  Med Tab  Media :23}   Lab Results   Component Value Date    WBC 7.16 2023    HGB 12.7 2023    HCT 39.1 2023    MCV 92.2 2023     2023      Lab Results   Component Value Date    GLUCOSE 80 2023    BUN 8 2023    CREATININE 0.62 2023    EGFR 112.1 2023    BCR 12.9 2023    K 4.1 2023    CO2 23.0 2023    CALCIUM 9.4 2023    ALBUMIN 3.9 2023    BILITOT 0.2 2023    AST 24 2023    ALT 26 2023      Lab Results   Component Value Date    TROPONINT 11 (H) 2023      Stress Test With Pet Myocardial Perfusion (Multi Study) (2020 09:50)  ECG 12 Lead ED Triage Standing Order; Chest Pain (2023 13:48)  ECG 12 Lead ED Triage Standing Order; Chest Pain (2023 11:57)  Stress Test With Myocardial Perfusion One Day (2023 11:22)    CT Angiogram Chest (2023 19:31)  XR Chest 1 View (2023 12:19)  Assessment & Plan   Assessment and Plan {CC Problem List  Visit Diagnosis  ROS  Review (Popup)  Health Maintenance  Quality  BestPractice  Medications  SmartSets  SnapShot  Encounters  Media :23}     1. Other chest pain  -Chest pain is atypical in nature  -Patient had stress test in April 2023 which was normal myocardial perfusion scan  -Encourage patient to treat heartburn with over-the-counter medications  - Adult Transthoracic Echo Complete W/ Cont if Necessary Per Protocol; Future  - Ambulatory Referral to Cardiology    2. Systolic congestive heart failure, unspecified HF chronicity  -Patient newly diagnosed with HFrEF in April 2023 via SPECT stress test  -She was initially initiated on GDMT but became hypotensive and required discontinuation of medications  -We will restart Jardiance today at 10 mg daily  -Samples given to patient of Jardiance 10 mg daily 7 tablets, #4, NDC: [id number], Lot: [id number], Exp 1/25.  -Patient also given coupon card for Jardiance and new prescription was sent in  -Patient has not had recent echocardiogram, we will obtain echocardiogram  - Adult Transthoracic Echo Complete W/ Cont if Necessary Per Protocol; Future  - Ambulatory Referral to Cardiology  -Patient would like to establish care with [facility]  -Patient to continue ambulatory blood pressure monitoring at home.  She will hold lisinopril if systolic blood pressures less than 100.  -Continue lisinopril 2.5 mg daily at this time  -Further adjustments to GDMT at time of next visit upon reassessment of echocardiogram and most recent blood pressures  -Heart failure education provided today including signs and symptoms, causes of heart failure, medications, daily weights, low sodium diet (less than 2000 mg per day), and daily physical activity as tolerated. Reviewed HF Zones with patient and family.       Follow Up {Instructions Charge Capture  Follow-up Communications :23}     Return in about 3 weeks (around 8/30/2023).      Patient was given instructions and counseling regarding her condition or for health maintenance advice. Please see specific information pulled into the AVS if  appropriate.  Patient was instructed to call the Heart and Valve Center with any questions, concerns, or worsening symptoms.    Dictated Utilizing Dragon Dictation   Please note that portions of this note were completed with a voice recognition program.   Part of this note may be an electronic transcription/translation of spoken language to printed text using the Dragon Dictation System.

## 2023-08-09 ENCOUNTER — APPOINTMENT (OUTPATIENT)
Dept: GENERAL RADIOLOGY | Facility: HOSPITAL | Age: 43
End: 2023-08-09
Payer: COMMERCIAL

## 2023-08-09 ENCOUNTER — OFFICE VISIT (OUTPATIENT)
Dept: CARDIOLOGY | Facility: HOSPITAL | Age: 43
End: 2023-08-09
Payer: COMMERCIAL

## 2023-08-09 ENCOUNTER — HOSPITAL ENCOUNTER (OUTPATIENT)
Facility: HOSPITAL | Age: 43
Setting detail: OBSERVATION
Discharge: NURSING, REHAB OR LONG-TERM CARE | End: 2023-08-10
Attending: STUDENT IN AN ORGANIZED HEALTH CARE EDUCATION/TRAINING PROGRAM | Admitting: INTERNAL MEDICINE
Payer: COMMERCIAL

## 2023-08-09 VITALS
RESPIRATION RATE: 20 BRPM | SYSTOLIC BLOOD PRESSURE: 114 MMHG | DIASTOLIC BLOOD PRESSURE: 74 MMHG | WEIGHT: 258.88 LBS | HEART RATE: 82 BPM | BODY MASS INDEX: 45.87 KG/M2 | OXYGEN SATURATION: 98 % | HEIGHT: 63 IN | TEMPERATURE: 99.2 F

## 2023-08-09 DIAGNOSIS — E66.01 CLASS 3 SEVERE OBESITY WITH BODY MASS INDEX (BMI) OF 45.0 TO 49.9 IN ADULT, UNSPECIFIED OBESITY TYPE, UNSPECIFIED WHETHER SERIOUS COMORBIDITY PRESENT: ICD-10-CM

## 2023-08-09 DIAGNOSIS — R07.89 OTHER CHEST PAIN: ICD-10-CM

## 2023-08-09 DIAGNOSIS — R07.9 CHEST PAIN, UNSPECIFIED TYPE: Primary | ICD-10-CM

## 2023-08-09 DIAGNOSIS — I50.20 SYSTOLIC CONGESTIVE HEART FAILURE, UNSPECIFIED HF CHRONICITY: ICD-10-CM

## 2023-08-09 DIAGNOSIS — E78.5 HYPERLIPIDEMIA, UNSPECIFIED HYPERLIPIDEMIA TYPE: ICD-10-CM

## 2023-08-09 DIAGNOSIS — E03.9 ACQUIRED HYPOTHYROIDISM: ICD-10-CM

## 2023-08-09 DIAGNOSIS — R77.8 TROPONIN LEVEL ELEVATED: ICD-10-CM

## 2023-08-09 DIAGNOSIS — R07.89 OTHER CHEST PAIN: Primary | ICD-10-CM

## 2023-08-09 LAB
ALBUMIN SERPL-MCNC: 3.7 G/DL (ref 3.5–5.2)
ALBUMIN/GLOB SERPL: 1.4 G/DL
ALP SERPL-CCNC: 86 U/L (ref 39–117)
ALT SERPL W P-5'-P-CCNC: 24 U/L (ref 1–33)
ANION GAP SERPL CALCULATED.3IONS-SCNC: 10 MMOL/L (ref 5–15)
AST SERPL-CCNC: 23 U/L (ref 1–32)
B-HCG UR QL: NEGATIVE
BACTERIA UR QL AUTO: ABNORMAL /HPF
BASOPHILS # BLD AUTO: 0.1 10*3/MM3 (ref 0–0.2)
BASOPHILS NFR BLD AUTO: 1.2 % (ref 0–1.5)
BILIRUB SERPL-MCNC: <0.2 MG/DL (ref 0–1.2)
BILIRUB UR QL STRIP: NEGATIVE
BUN SERPL-MCNC: 7 MG/DL (ref 6–20)
BUN/CREAT SERPL: 10.6 (ref 7–25)
CALCIUM SPEC-SCNC: 8.8 MG/DL (ref 8.6–10.5)
CHLORIDE SERPL-SCNC: 110 MMOL/L (ref 98–107)
CLARITY UR: CLEAR
CO2 SERPL-SCNC: 25 MMOL/L (ref 22–29)
COLOR UR: YELLOW
CREAT SERPL-MCNC: 0.66 MG/DL (ref 0.57–1)
CRP SERPL-MCNC: 1.38 MG/DL (ref 0–0.5)
D DIMER PPP FEU-MCNC: 0.29 MCGFEU/ML (ref 0–0.5)
D-LACTATE SERPL-SCNC: 1.4 MMOL/L (ref 0.5–2)
DEPRECATED RDW RBC AUTO: 46.3 FL (ref 37–54)
EGFRCR SERPLBLD CKD-EPI 2021: 110.4 ML/MIN/1.73
EOSINOPHIL # BLD AUTO: 0.4 10*3/MM3 (ref 0–0.4)
EOSINOPHIL NFR BLD AUTO: 4.6 % (ref 0.3–6.2)
ERYTHROCYTE [DISTWIDTH] IN BLOOD BY AUTOMATED COUNT: 13.5 % (ref 12.3–15.4)
EXPIRATION DATE: NORMAL
FLUAV RNA RESP QL NAA+PROBE: NOT DETECTED
FLUBV RNA RESP QL NAA+PROBE: NOT DETECTED
GLOBULIN UR ELPH-MCNC: 2.7 GM/DL
GLUCOSE SERPL-MCNC: 114 MG/DL (ref 65–99)
GLUCOSE UR STRIP-MCNC: NEGATIVE MG/DL
HCT VFR BLD AUTO: 38.4 % (ref 34–46.6)
HGB BLD-MCNC: 12.2 G/DL (ref 12–15.9)
HGB UR QL STRIP.AUTO: NEGATIVE
HOLD SPECIMEN: NORMAL
HOLD SPECIMEN: NORMAL
HYALINE CASTS UR QL AUTO: ABNORMAL /LPF
IMM GRANULOCYTES # BLD AUTO: 0.03 10*3/MM3 (ref 0–0.05)
IMM GRANULOCYTES NFR BLD AUTO: 0.3 % (ref 0–0.5)
INTERNAL NEGATIVE CONTROL: NEGATIVE
INTERNAL POSITIVE CONTROL: POSITIVE
KETONES UR QL STRIP: NEGATIVE
LEUKOCYTE ESTERASE UR QL STRIP.AUTO: ABNORMAL
LIPASE SERPL-CCNC: 40 U/L (ref 13–60)
LYMPHOCYTES # BLD AUTO: 2.71 10*3/MM3 (ref 0.7–3.1)
LYMPHOCYTES NFR BLD AUTO: 31.4 % (ref 19.6–45.3)
Lab: NORMAL
MAGNESIUM SERPL-MCNC: 1.9 MG/DL (ref 1.6–2.6)
MCH RBC QN AUTO: 29.9 PG (ref 26.6–33)
MCHC RBC AUTO-ENTMCNC: 31.8 G/DL (ref 31.5–35.7)
MCV RBC AUTO: 94.1 FL (ref 79–97)
MONOCYTES # BLD AUTO: 0.48 10*3/MM3 (ref 0.1–0.9)
MONOCYTES NFR BLD AUTO: 5.6 % (ref 5–12)
NEUTROPHILS NFR BLD AUTO: 4.9 10*3/MM3 (ref 1.7–7)
NEUTROPHILS NFR BLD AUTO: 56.9 % (ref 42.7–76)
NITRITE UR QL STRIP: NEGATIVE
NRBC BLD AUTO-RTO: 0 /100 WBC (ref 0–0.2)
NT-PROBNP SERPL-MCNC: 64.8 PG/ML (ref 0–450)
PH UR STRIP.AUTO: 6 [PH] (ref 5–8)
PHOSPHATE SERPL-MCNC: 2.6 MG/DL (ref 2.5–4.5)
PLATELET # BLD AUTO: 347 10*3/MM3 (ref 140–450)
PMV BLD AUTO: 9.7 FL (ref 6–12)
POTASSIUM SERPL-SCNC: 3.7 MMOL/L (ref 3.5–5.2)
PROT SERPL-MCNC: 6.4 G/DL (ref 6–8.5)
PROT UR QL STRIP: NEGATIVE
RBC # BLD AUTO: 4.08 10*6/MM3 (ref 3.77–5.28)
RBC # UR STRIP: ABNORMAL /HPF
REF LAB TEST METHOD: ABNORMAL
RSV RNA NPH QL NAA+NON-PROBE: NOT DETECTED
SARS-COV-2 RNA RESP QL NAA+PROBE: NOT DETECTED
SODIUM SERPL-SCNC: 145 MMOL/L (ref 136–145)
SP GR UR STRIP: 1.01 (ref 1–1.03)
SQUAMOUS #/AREA URNS HPF: ABNORMAL /HPF
T4 FREE SERPL-MCNC: 0.24 NG/DL (ref 0.93–1.7)
TROPONIN T SERPL HS-MCNC: 9 NG/L
TSH SERPL DL<=0.05 MIU/L-ACNC: 0.04 UIU/ML (ref 0.27–4.2)
UROBILINOGEN UR QL STRIP: ABNORMAL
WBC # UR STRIP: ABNORMAL /HPF
WBC NRBC COR # BLD: 8.62 10*3/MM3 (ref 3.4–10.8)
WHOLE BLOOD HOLD COAG: NORMAL
WHOLE BLOOD HOLD SPECIMEN: NORMAL

## 2023-08-09 PROCEDURE — 84100 ASSAY OF PHOSPHORUS: CPT | Performed by: STUDENT IN AN ORGANIZED HEALTH CARE EDUCATION/TRAINING PROGRAM

## 2023-08-09 PROCEDURE — 86140 C-REACTIVE PROTEIN: CPT | Performed by: STUDENT IN AN ORGANIZED HEALTH CARE EDUCATION/TRAINING PROGRAM

## 2023-08-09 PROCEDURE — 36415 COLL VENOUS BLD VENIPUNCTURE: CPT

## 2023-08-09 PROCEDURE — G0378 HOSPITAL OBSERVATION PER HR: HCPCS

## 2023-08-09 PROCEDURE — 99284 EMERGENCY DEPT VISIT MOD MDM: CPT

## 2023-08-09 PROCEDURE — 83690 ASSAY OF LIPASE: CPT | Performed by: STUDENT IN AN ORGANIZED HEALTH CARE EDUCATION/TRAINING PROGRAM

## 2023-08-09 PROCEDURE — 84443 ASSAY THYROID STIM HORMONE: CPT | Performed by: STUDENT IN AN ORGANIZED HEALTH CARE EDUCATION/TRAINING PROGRAM

## 2023-08-09 PROCEDURE — 85379 FIBRIN DEGRADATION QUANT: CPT | Performed by: STUDENT IN AN ORGANIZED HEALTH CARE EDUCATION/TRAINING PROGRAM

## 2023-08-09 PROCEDURE — 83880 ASSAY OF NATRIURETIC PEPTIDE: CPT | Performed by: STUDENT IN AN ORGANIZED HEALTH CARE EDUCATION/TRAINING PROGRAM

## 2023-08-09 PROCEDURE — 81025 URINE PREGNANCY TEST: CPT | Performed by: STUDENT IN AN ORGANIZED HEALTH CARE EDUCATION/TRAINING PROGRAM

## 2023-08-09 PROCEDURE — 83735 ASSAY OF MAGNESIUM: CPT | Performed by: STUDENT IN AN ORGANIZED HEALTH CARE EDUCATION/TRAINING PROGRAM

## 2023-08-09 PROCEDURE — 93005 ELECTROCARDIOGRAM TRACING: CPT | Performed by: STUDENT IN AN ORGANIZED HEALTH CARE EDUCATION/TRAINING PROGRAM

## 2023-08-09 PROCEDURE — 96366 THER/PROPH/DIAG IV INF ADDON: CPT

## 2023-08-09 PROCEDURE — 87637 SARSCOV2&INF A&B&RSV AMP PRB: CPT | Performed by: STUDENT IN AN ORGANIZED HEALTH CARE EDUCATION/TRAINING PROGRAM

## 2023-08-09 PROCEDURE — 84484 ASSAY OF TROPONIN QUANT: CPT | Performed by: STUDENT IN AN ORGANIZED HEALTH CARE EDUCATION/TRAINING PROGRAM

## 2023-08-09 PROCEDURE — 84481 FREE ASSAY (FT-3): CPT | Performed by: INTERNAL MEDICINE

## 2023-08-09 PROCEDURE — 83605 ASSAY OF LACTIC ACID: CPT | Performed by: STUDENT IN AN ORGANIZED HEALTH CARE EDUCATION/TRAINING PROGRAM

## 2023-08-09 PROCEDURE — 85025 COMPLETE CBC W/AUTO DIFF WBC: CPT | Performed by: STUDENT IN AN ORGANIZED HEALTH CARE EDUCATION/TRAINING PROGRAM

## 2023-08-09 PROCEDURE — 80053 COMPREHEN METABOLIC PANEL: CPT | Performed by: STUDENT IN AN ORGANIZED HEALTH CARE EDUCATION/TRAINING PROGRAM

## 2023-08-09 PROCEDURE — 84439 ASSAY OF FREE THYROXINE: CPT | Performed by: STUDENT IN AN ORGANIZED HEALTH CARE EDUCATION/TRAINING PROGRAM

## 2023-08-09 PROCEDURE — 71045 X-RAY EXAM CHEST 1 VIEW: CPT

## 2023-08-09 PROCEDURE — 96365 THER/PROPH/DIAG IV INF INIT: CPT

## 2023-08-09 PROCEDURE — 25010000002 NITROGLYCERIN 200 MCG/ML SOLUTION: Performed by: STUDENT IN AN ORGANIZED HEALTH CARE EDUCATION/TRAINING PROGRAM

## 2023-08-09 PROCEDURE — 87086 URINE CULTURE/COLONY COUNT: CPT | Performed by: STUDENT IN AN ORGANIZED HEALTH CARE EDUCATION/TRAINING PROGRAM

## 2023-08-09 PROCEDURE — 81001 URINALYSIS AUTO W/SCOPE: CPT | Performed by: STUDENT IN AN ORGANIZED HEALTH CARE EDUCATION/TRAINING PROGRAM

## 2023-08-09 RX ORDER — NITROGLYCERIN 20 MG/100ML
5-200 INJECTION INTRAVENOUS
Status: DISCONTINUED | OUTPATIENT
Start: 2023-08-09 | End: 2023-08-11 | Stop reason: HOSPADM

## 2023-08-09 RX ORDER — LEVOTHYROXINE SODIUM 112 UG/1
112 TABLET ORAL DAILY
COMMUNITY

## 2023-08-09 RX ORDER — ASPIRIN 81 MG/1
324 TABLET, CHEWABLE ORAL ONCE
Status: DISCONTINUED | OUTPATIENT
Start: 2023-08-09 | End: 2023-08-11 | Stop reason: HOSPADM

## 2023-08-09 RX ORDER — SODIUM CHLORIDE 9 MG/ML
125 INJECTION, SOLUTION INTRAVENOUS CONTINUOUS
Status: DISCONTINUED | OUTPATIENT
Start: 2023-08-09 | End: 2023-08-10

## 2023-08-09 RX ORDER — SODIUM CHLORIDE 0.9 % (FLUSH) 0.9 %
10 SYRINGE (ML) INJECTION AS NEEDED
Status: DISCONTINUED | OUTPATIENT
Start: 2023-08-09 | End: 2023-08-11 | Stop reason: HOSPADM

## 2023-08-09 RX ADMIN — NITROGLYCERIN 10 MCG/MIN: 20 INJECTION INTRAVENOUS at 22:47

## 2023-08-09 RX ADMIN — SODIUM CHLORIDE 125 ML/HR: 9 INJECTION, SOLUTION INTRAVENOUS at 22:48

## 2023-08-09 NOTE — Clinical Note
Level of Care: Telemetry [5]   Diagnosis: Chest pain [281528]   Admitting Physician: THU WOO [[id number]]   Attending Physician: THU WOO [[id number]]   Bed Request Comments: tele

## 2023-08-10 ENCOUNTER — APPOINTMENT (OUTPATIENT)
Dept: CARDIOLOGY | Facility: HOSPITAL | Age: 43
End: 2023-08-10
Payer: COMMERCIAL

## 2023-08-10 VITALS
BODY MASS INDEX: 46.07 KG/M2 | DIASTOLIC BLOOD PRESSURE: 69 MMHG | TEMPERATURE: 98.1 F | WEIGHT: 260 LBS | RESPIRATION RATE: 18 BRPM | SYSTOLIC BLOOD PRESSURE: 115 MMHG | HEIGHT: 63 IN | HEART RATE: 73 BPM | OXYGEN SATURATION: 93 %

## 2023-08-10 PROBLEM — R77.8 TROPONIN LEVEL ELEVATED: Status: ACTIVE | Noted: 2023-08-09

## 2023-08-10 PROBLEM — I10 ESSENTIAL HYPERTENSION: Status: ACTIVE | Noted: 2023-08-10

## 2023-08-10 PROBLEM — R79.89 TROPONIN LEVEL ELEVATED: Status: ACTIVE | Noted: 2023-08-09

## 2023-08-10 LAB
ALBUMIN SERPL-MCNC: 3.4 G/DL (ref 3.5–5.2)
ALBUMIN/GLOB SERPL: 1.5 G/DL
ALP SERPL-CCNC: 79 U/L (ref 39–117)
ALT SERPL W P-5'-P-CCNC: 20 U/L (ref 1–33)
ANION GAP SERPL CALCULATED.3IONS-SCNC: 9 MMOL/L (ref 5–15)
AST SERPL-CCNC: 17 U/L (ref 1–32)
BASOPHILS # BLD AUTO: 0.1 10*3/MM3 (ref 0–0.2)
BASOPHILS NFR BLD AUTO: 1.4 % (ref 0–1.5)
BH CV ECHO MEAS - AO MAX PG: 4 MMHG
BH CV ECHO MEAS - AO MEAN PG: 2 MMHG
BH CV ECHO MEAS - AO ROOT DIAM: 3.7 CM
BH CV ECHO MEAS - AO V2 MAX: 99.5 CM/SEC
BH CV ECHO MEAS - AO V2 VTI: 21.2 CM
BH CV ECHO MEAS - AVA(I,D): 2.16 CM2
BH CV ECHO MEAS - EDV(CUBED): 91.1 ML
BH CV ECHO MEAS - EDV(MOD-SP2): 184 ML
BH CV ECHO MEAS - EDV(MOD-SP4): 152 ML
BH CV ECHO MEAS - EF(MOD-BP): 51 %
BH CV ECHO MEAS - EF(MOD-SP2): 53.1 %
BH CV ECHO MEAS - EF(MOD-SP4): 53.5 %
BH CV ECHO MEAS - ESV(CUBED): 39.3 ML
BH CV ECHO MEAS - ESV(MOD-SP2): 86.3 ML
BH CV ECHO MEAS - ESV(MOD-SP4): 70.7 ML
BH CV ECHO MEAS - FS: 24.4 %
BH CV ECHO MEAS - IVS/LVPW: 1.11 CM
BH CV ECHO MEAS - IVSD: 1 CM
BH CV ECHO MEAS - LA DIMENSION: 2.8 CM
BH CV ECHO MEAS - LAT PEAK E' VEL: 8.3 CM/SEC
BH CV ECHO MEAS - LV DIASTOLIC VOL/BSA (35-75): 70.3 CM2
BH CV ECHO MEAS - LV MASS(C)D: 142.9 GRAMS
BH CV ECHO MEAS - LV MAX PG: 1.48 MMHG
BH CV ECHO MEAS - LV MEAN PG: 1 MMHG
BH CV ECHO MEAS - LV SYSTOLIC VOL/BSA (12-30): 32.7 CM2
BH CV ECHO MEAS - LV V1 MAX: 60.8 CM/SEC
BH CV ECHO MEAS - LV V1 VTI: 13.2 CM
BH CV ECHO MEAS - LVIDD: 4.5 CM
BH CV ECHO MEAS - LVIDS: 3.4 CM
BH CV ECHO MEAS - LVOT AREA: 3.5 CM2
BH CV ECHO MEAS - LVOT DIAM: 2.1 CM
BH CV ECHO MEAS - LVPWD: 0.9 CM
BH CV ECHO MEAS - MED PEAK E' VEL: 6.3 CM/SEC
BH CV ECHO MEAS - MV A MAX VEL: 50.8 CM/SEC
BH CV ECHO MEAS - MV DEC SLOPE: 329 CM/SEC2
BH CV ECHO MEAS - MV DEC TIME: 0.23 MSEC
BH CV ECHO MEAS - MV E MAX VEL: 61.3 CM/SEC
BH CV ECHO MEAS - MV E/A: 1.21
BH CV ECHO MEAS - MV MAX PG: 2.5 MMHG
BH CV ECHO MEAS - MV MEAN PG: 1 MMHG
BH CV ECHO MEAS - MV P1/2T: 79.9 MSEC
BH CV ECHO MEAS - MV V2 VTI: 27.5 CM
BH CV ECHO MEAS - MVA(P1/2T): 2.8 CM2
BH CV ECHO MEAS - MVA(VTI): 1.66 CM2
BH CV ECHO MEAS - PA ACC TIME: 0.2 SEC
BH CV ECHO MEAS - SI(MOD-SP2): 45.2 ML/M2
BH CV ECHO MEAS - SI(MOD-SP4): 37.6 ML/M2
BH CV ECHO MEAS - SV(LVOT): 45.7 ML
BH CV ECHO MEAS - SV(MOD-SP2): 97.7 ML
BH CV ECHO MEAS - SV(MOD-SP4): 81.3 ML
BH CV ECHO MEAS - TAPSE (>1.6): 1.83 CM
BH CV ECHO MEASUREMENTS AVERAGE E/E' RATIO: 8.4
BH CV XLRA - RV BASE: 3.9 CM
BH CV XLRA - RV LENGTH: 6.5 CM
BH CV XLRA - RV MID: 3.1 CM
BH CV XLRA - TDI S': 11.2 CM/SEC
BILIRUB SERPL-MCNC: 0.2 MG/DL (ref 0–1.2)
BUN SERPL-MCNC: 5 MG/DL (ref 6–20)
BUN/CREAT SERPL: 7.9 (ref 7–25)
CALCIUM SPEC-SCNC: 8.4 MG/DL (ref 8.6–10.5)
CHLORIDE SERPL-SCNC: 109 MMOL/L (ref 98–107)
CHOLEST SERPL-MCNC: 203 MG/DL (ref 0–200)
CO2 SERPL-SCNC: 24 MMOL/L (ref 22–29)
CORTIS SERPL-MCNC: 3.46 MCG/DL
CREAT SERPL-MCNC: 0.63 MG/DL (ref 0.57–1)
DEPRECATED RDW RBC AUTO: 46 FL (ref 37–54)
EGFRCR SERPLBLD CKD-EPI 2021: 111.6 ML/MIN/1.73
EOSINOPHIL # BLD AUTO: 0.33 10*3/MM3 (ref 0–0.4)
EOSINOPHIL NFR BLD AUTO: 4.7 % (ref 0.3–6.2)
ERYTHROCYTE [DISTWIDTH] IN BLOOD BY AUTOMATED COUNT: 13.7 % (ref 12.3–15.4)
GEN 5 2HR TROPONIN T REFLEX: 11 NG/L
GLOBULIN UR ELPH-MCNC: 2.3 GM/DL
GLUCOSE SERPL-MCNC: 81 MG/DL (ref 65–99)
HBA1C MFR BLD: 5.3 % (ref 4.8–5.6)
HCT VFR BLD AUTO: 35.6 % (ref 34–46.6)
HDLC SERPL-MCNC: 42 MG/DL (ref 40–60)
HGB BLD-MCNC: 11.4 G/DL (ref 12–15.9)
HOLD SPECIMEN: NORMAL
IMM GRANULOCYTES # BLD AUTO: 0.03 10*3/MM3 (ref 0–0.05)
IMM GRANULOCYTES NFR BLD AUTO: 0.4 % (ref 0–0.5)
LDLC SERPL CALC-MCNC: 140 MG/DL (ref 0–100)
LDLC/HDLC SERPL: 3.29 {RATIO}
LEFT ATRIUM VOLUME INDEX: 13.2 ML/M2
LV EF 2D ECHO EST: 52 %
LYMPHOCYTES # BLD AUTO: 2.45 10*3/MM3 (ref 0.7–3.1)
LYMPHOCYTES NFR BLD AUTO: 35.1 % (ref 19.6–45.3)
MAGNESIUM SERPL-MCNC: 1.9 MG/DL (ref 1.6–2.6)
MCH RBC QN AUTO: 29.8 PG (ref 26.6–33)
MCHC RBC AUTO-ENTMCNC: 32 G/DL (ref 31.5–35.7)
MCV RBC AUTO: 93.2 FL (ref 79–97)
MONOCYTES # BLD AUTO: 0.33 10*3/MM3 (ref 0.1–0.9)
MONOCYTES NFR BLD AUTO: 4.7 % (ref 5–12)
NEUTROPHILS NFR BLD AUTO: 3.74 10*3/MM3 (ref 1.7–7)
NEUTROPHILS NFR BLD AUTO: 53.7 % (ref 42.7–76)
NRBC BLD AUTO-RTO: 0 /100 WBC (ref 0–0.2)
PLATELET # BLD AUTO: 311 10*3/MM3 (ref 140–450)
PMV BLD AUTO: 9.8 FL (ref 6–12)
POTASSIUM SERPL-SCNC: 4 MMOL/L (ref 3.5–5.2)
PROT SERPL-MCNC: 5.7 G/DL (ref 6–8.5)
RBC # BLD AUTO: 3.82 10*6/MM3 (ref 3.77–5.28)
SODIUM SERPL-SCNC: 142 MMOL/L (ref 136–145)
T3FREE SERPL-MCNC: 1.4 PG/ML (ref 2–4.4)
TRIGL SERPL-MCNC: 115 MG/DL (ref 0–150)
TROPONIN T DELTA: 2 NG/L
TROPONIN T SERPL HS-MCNC: 8 NG/L
VLDLC SERPL-MCNC: 21 MG/DL (ref 5–40)
WBC NRBC COR # BLD: 6.98 10*3/MM3 (ref 3.4–10.8)

## 2023-08-10 PROCEDURE — 25010000002 MIDAZOLAM PER 1 MG: Performed by: INTERNAL MEDICINE

## 2023-08-10 PROCEDURE — 83519 RIA NONANTIBODY: CPT | Performed by: INTERNAL MEDICINE

## 2023-08-10 PROCEDURE — A9270 NON-COVERED ITEM OR SERVICE: HCPCS | Performed by: INTERNAL MEDICINE

## 2023-08-10 PROCEDURE — 25010000002 NITROGLYCERIN 100-5 MCG/ML-% SOLUTION: Performed by: INTERNAL MEDICINE

## 2023-08-10 PROCEDURE — 25510000001 IOPAMIDOL PER 1 ML: Performed by: INTERNAL MEDICINE

## 2023-08-10 PROCEDURE — 83036 HEMOGLOBIN GLYCOSYLATED A1C: CPT | Performed by: INTERNAL MEDICINE

## 2023-08-10 PROCEDURE — 93458 L HRT ARTERY/VENTRICLE ANGIO: CPT | Performed by: INTERNAL MEDICINE

## 2023-08-10 PROCEDURE — C1769 GUIDE WIRE: HCPCS | Performed by: INTERNAL MEDICINE

## 2023-08-10 PROCEDURE — G0378 HOSPITAL OBSERVATION PER HR: HCPCS

## 2023-08-10 PROCEDURE — 25010000002 SULFUR HEXAFLUORIDE MICROSPH 60.7-25 MG RECONSTITUTED SUSPENSION: Performed by: INTERNAL MEDICINE

## 2023-08-10 PROCEDURE — 63710000001 EMPAGLIFLOZIN 10 MG TABLET: Performed by: INTERNAL MEDICINE

## 2023-08-10 PROCEDURE — 63710000001 LEVOTHYROXINE 112 MCG TABLET: Performed by: INTERNAL MEDICINE

## 2023-08-10 PROCEDURE — 96366 THER/PROPH/DIAG IV INF ADDON: CPT

## 2023-08-10 PROCEDURE — 93306 TTE W/DOPPLER COMPLETE: CPT

## 2023-08-10 PROCEDURE — 82533 TOTAL CORTISOL: CPT | Performed by: INTERNAL MEDICINE

## 2023-08-10 PROCEDURE — 63710000001 ACETAMINOPHEN 325 MG TABLET: Performed by: NURSE PRACTITIONER

## 2023-08-10 PROCEDURE — 63710000001 DESVENLAFAXINE 50 MG TABLET SUSTAINED-RELEASE 24 HOUR: Performed by: INTERNAL MEDICINE

## 2023-08-10 PROCEDURE — 25010000002 HEPARIN (PORCINE) PER 1000 UNITS: Performed by: INTERNAL MEDICINE

## 2023-08-10 PROCEDURE — 63710000001 ATORVASTATIN 20 MG TABLET: Performed by: INTERNAL MEDICINE

## 2023-08-10 PROCEDURE — C1894 INTRO/SHEATH, NON-LASER: HCPCS | Performed by: INTERNAL MEDICINE

## 2023-08-10 PROCEDURE — 63710000001 ARIPIPRAZOLE 10 MG TABLET: Performed by: INTERNAL MEDICINE

## 2023-08-10 PROCEDURE — 96361 HYDRATE IV INFUSION ADD-ON: CPT

## 2023-08-10 PROCEDURE — 63710000001 LISINOPRIL 5 MG TABLET: Performed by: INTERNAL MEDICINE

## 2023-08-10 PROCEDURE — 84484 ASSAY OF TROPONIN QUANT: CPT | Performed by: INTERNAL MEDICINE

## 2023-08-10 PROCEDURE — 80053 COMPREHEN METABOLIC PANEL: CPT | Performed by: INTERNAL MEDICINE

## 2023-08-10 PROCEDURE — 80061 LIPID PANEL: CPT | Performed by: INTERNAL MEDICINE

## 2023-08-10 PROCEDURE — 99204 OFFICE O/P NEW MOD 45 MIN: CPT | Performed by: INTERNAL MEDICINE

## 2023-08-10 PROCEDURE — A9270 NON-COVERED ITEM OR SERVICE: HCPCS | Performed by: NURSE PRACTITIONER

## 2023-08-10 PROCEDURE — 85025 COMPLETE CBC W/AUTO DIFF WBC: CPT | Performed by: INTERNAL MEDICINE

## 2023-08-10 PROCEDURE — 93306 TTE W/DOPPLER COMPLETE: CPT | Performed by: INTERNAL MEDICINE

## 2023-08-10 PROCEDURE — 83735 ASSAY OF MAGNESIUM: CPT | Performed by: INTERNAL MEDICINE

## 2023-08-10 RX ORDER — NITROGLYCERIN 0.4 MG/1
0.4 TABLET SUBLINGUAL
Status: DISCONTINUED | OUTPATIENT
Start: 2023-08-10 | End: 2023-08-11 | Stop reason: HOSPADM

## 2023-08-10 RX ORDER — MIDAZOLAM HYDROCHLORIDE 1 MG/ML
INJECTION INTRAMUSCULAR; INTRAVENOUS
Status: DISCONTINUED | OUTPATIENT
Start: 2023-08-10 | End: 2023-08-10 | Stop reason: HOSPADM

## 2023-08-10 RX ORDER — ACETAMINOPHEN 325 MG/1
650 TABLET ORAL EVERY 6 HOURS PRN
Status: DISCONTINUED | OUTPATIENT
Start: 2023-08-10 | End: 2023-08-10 | Stop reason: SDUPTHER

## 2023-08-10 RX ORDER — LIDOCAINE HYDROCHLORIDE 10 MG/ML
INJECTION, SOLUTION EPIDURAL; INFILTRATION; INTRACAUDAL; PERINEURAL
Status: DISCONTINUED | OUTPATIENT
Start: 2023-08-10 | End: 2023-08-10 | Stop reason: HOSPADM

## 2023-08-10 RX ORDER — SODIUM CHLORIDE 0.9 % (FLUSH) 0.9 %
10 SYRINGE (ML) INJECTION AS NEEDED
Status: DISCONTINUED | OUTPATIENT
Start: 2023-08-10 | End: 2023-08-11 | Stop reason: HOSPADM

## 2023-08-10 RX ORDER — ARIPIPRAZOLE 10 MG/1
5 TABLET ORAL NIGHTLY
Status: DISCONTINUED | OUTPATIENT
Start: 2023-08-10 | End: 2023-08-11 | Stop reason: HOSPADM

## 2023-08-10 RX ORDER — ACETAMINOPHEN 325 MG/1
650 TABLET ORAL EVERY 4 HOURS PRN
Status: DISCONTINUED | OUTPATIENT
Start: 2023-08-10 | End: 2023-08-11 | Stop reason: HOSPADM

## 2023-08-10 RX ORDER — NITROGLYCERIN 0.4 MG/1
0.4 TABLET SUBLINGUAL
Status: DISCONTINUED | OUTPATIENT
Start: 2023-08-10 | End: 2023-08-10

## 2023-08-10 RX ORDER — HEPARIN SODIUM 1000 [USP'U]/ML
INJECTION, SOLUTION INTRAVENOUS; SUBCUTANEOUS
Status: DISCONTINUED | OUTPATIENT
Start: 2023-08-10 | End: 2023-08-10 | Stop reason: HOSPADM

## 2023-08-10 RX ORDER — ONDANSETRON 2 MG/ML
4 INJECTION INTRAMUSCULAR; INTRAVENOUS EVERY 6 HOURS PRN
Status: DISCONTINUED | OUTPATIENT
Start: 2023-08-10 | End: 2023-08-11 | Stop reason: HOSPADM

## 2023-08-10 RX ORDER — NALOXONE HCL 0.4 MG/ML
0.4 VIAL (ML) INJECTION
Status: DISCONTINUED | OUTPATIENT
Start: 2023-08-10 | End: 2023-08-11 | Stop reason: HOSPADM

## 2023-08-10 RX ORDER — LEVOTHYROXINE SODIUM 112 UG/1
112 TABLET ORAL
Status: DISCONTINUED | OUTPATIENT
Start: 2023-08-10 | End: 2023-08-11 | Stop reason: HOSPADM

## 2023-08-10 RX ORDER — ASPIRIN 81 MG/1
81 TABLET ORAL DAILY
Status: DISCONTINUED | OUTPATIENT
Start: 2023-08-10 | End: 2023-08-10

## 2023-08-10 RX ORDER — MORPHINE SULFATE 2 MG/ML
1 INJECTION, SOLUTION INTRAMUSCULAR; INTRAVENOUS EVERY 4 HOURS PRN
Status: DISCONTINUED | OUTPATIENT
Start: 2023-08-10 | End: 2023-08-11 | Stop reason: HOSPADM

## 2023-08-10 RX ORDER — NICARDIPINE HCL-0.9% SOD CHLOR 1 MG/10 ML
SYRINGE (ML) INTRAVENOUS
Status: DISCONTINUED | OUTPATIENT
Start: 2023-08-10 | End: 2023-08-10 | Stop reason: HOSPADM

## 2023-08-10 RX ORDER — DESVENLAFAXINE SUCCINATE 50 MG/1
50 TABLET, EXTENDED RELEASE ORAL NIGHTLY
Status: DISCONTINUED | OUTPATIENT
Start: 2023-08-10 | End: 2023-08-11 | Stop reason: HOSPADM

## 2023-08-10 RX ORDER — ASPIRIN 81 MG/1
81 TABLET ORAL DAILY
Status: DISCONTINUED | OUTPATIENT
Start: 2023-08-11 | End: 2023-08-11 | Stop reason: HOSPADM

## 2023-08-10 RX ORDER — ATORVASTATIN CALCIUM 20 MG/1
20 TABLET, FILM COATED ORAL NIGHTLY
Status: DISCONTINUED | OUTPATIENT
Start: 2023-08-10 | End: 2023-08-11 | Stop reason: HOSPADM

## 2023-08-10 RX ORDER — ACETAMINOPHEN 325 MG/1
650 TABLET ORAL EVERY 4 HOURS PRN
Status: DISCONTINUED | OUTPATIENT
Start: 2023-08-10 | End: 2023-08-10 | Stop reason: SDUPTHER

## 2023-08-10 RX ORDER — LISINOPRIL 5 MG/1
2.5 TABLET ORAL DAILY
Status: DISCONTINUED | OUTPATIENT
Start: 2023-08-10 | End: 2023-08-11 | Stop reason: HOSPADM

## 2023-08-10 RX ORDER — SODIUM CHLORIDE 9 MG/ML
100 INJECTION, SOLUTION INTRAVENOUS CONTINUOUS
Status: DISCONTINUED | OUTPATIENT
Start: 2023-08-10 | End: 2023-08-10 | Stop reason: HOSPADM

## 2023-08-10 RX ORDER — HYDROCODONE BITARTRATE AND ACETAMINOPHEN 5; 325 MG/1; MG/1
1 TABLET ORAL EVERY 4 HOURS PRN
Status: DISCONTINUED | OUTPATIENT
Start: 2023-08-10 | End: 2023-08-11 | Stop reason: HOSPADM

## 2023-08-10 RX ORDER — SODIUM CHLORIDE 9 MG/ML
40 INJECTION, SOLUTION INTRAVENOUS AS NEEDED
Status: DISCONTINUED | OUTPATIENT
Start: 2023-08-10 | End: 2023-08-11 | Stop reason: HOSPADM

## 2023-08-10 RX ORDER — SODIUM CHLORIDE 0.9 % (FLUSH) 0.9 %
10 SYRINGE (ML) INJECTION EVERY 12 HOURS SCHEDULED
Status: DISCONTINUED | OUTPATIENT
Start: 2023-08-10 | End: 2023-08-11 | Stop reason: HOSPADM

## 2023-08-10 RX ORDER — CHOLECALCIFEROL (VITAMIN D3) 125 MCG
5 CAPSULE ORAL NIGHTLY PRN
Status: DISCONTINUED | OUTPATIENT
Start: 2023-08-10 | End: 2023-08-11 | Stop reason: HOSPADM

## 2023-08-10 RX ORDER — PANTOPRAZOLE SODIUM 40 MG/1
40 TABLET, DELAYED RELEASE ORAL DAILY
Qty: 30 TABLET | Refills: 1 | Status: SHIPPED | OUTPATIENT
Start: 2023-08-10

## 2023-08-10 RX ADMIN — ACETAMINOPHEN 650 MG: 325 TABLET ORAL at 11:21

## 2023-08-10 RX ADMIN — SULFUR HEXAFLUORIDE 5 ML: KIT at 10:36

## 2023-08-10 RX ADMIN — SODIUM CHLORIDE 125 ML/HR: 9 INJECTION, SOLUTION INTRAVENOUS at 05:16

## 2023-08-10 RX ADMIN — EMPAGLIFLOZIN 10 MG: 10 TABLET, FILM COATED ORAL at 08:22

## 2023-08-10 RX ADMIN — DESVENLAFAXINE 50 MG: 50 TABLET, FILM COATED, EXTENDED RELEASE ORAL at 02:03

## 2023-08-10 RX ADMIN — SODIUM CHLORIDE 100 ML/HR: 9 INJECTION, SOLUTION INTRAVENOUS at 19:46

## 2023-08-10 RX ADMIN — LISINOPRIL 2.5 MG: 5 TABLET ORAL at 08:22

## 2023-08-10 RX ADMIN — Medication 10 ML: at 01:35

## 2023-08-10 RX ADMIN — ACETAMINOPHEN 650 MG: 325 TABLET ORAL at 01:34

## 2023-08-10 RX ADMIN — ATORVASTATIN CALCIUM 20 MG: 20 TABLET, FILM COATED ORAL at 01:34

## 2023-08-10 RX ADMIN — ARIPIPRAZOLE 5 MG: 10 TABLET ORAL at 01:34

## 2023-08-10 RX ADMIN — LEVOTHYROXINE SODIUM 112 MCG: 0.11 TABLET ORAL at 05:15

## 2023-08-10 NOTE — PLAN OF CARE
Problem: Chest Pain  Goal: Resolution of Chest Pain Symptoms  Outcome: Ongoing, Progressing   Goal Outcome Evaluation:      Received pt from ER. VSS, A&Ox4, NSR, RA. Nitro gtt currently infusing at initial rate. CP of 0 out of 10. Will continue to monitor.

## 2023-08-10 NOTE — ED PROVIDER NOTES
EMERGENCY DEPARTMENT ENCOUNTER    Pt Name: Delmi Glasgow  MRN: [id number]  Pt :   1980  Room Number:  S456/1  Date of encounter:  2023  PCP: Prosper Thakur DO  ED Provider: Norman Cabrera MD    Historian: Patient,       HPI:  Chief Complaint: Chest pain, vomiting        Context: Delmi Glasgow is a 43-year-old woman who comes the emergency department for ongoing chest pain.  She said the pain started around 8 PM she was sitting down in her bed when she suddenly had severe sharp tight substernal chest pain that began radiating to the bilateral axillas.  She then had nausea and vomiting.  She tried to take nitroglycerin at home but emesis.  She contacted EMS they gave her nitroglycerin which improved her  had chest pain significantly and she now rates it as mild.  Is not having nausea at this time either.  Denies recent illness or fevers.  Denies leg swelling or immobilization.  No other complaints at this time.      PAST MEDICAL HISTORY  Past Medical History:   Diagnosis Date    CHF (congestive heart failure)     Depression     Elevated cholesterol     Hyperlipemia     Hypothyroid     Sleep apnea          PAST SURGICAL HISTORY  Past Surgical History:   Procedure Laterality Date    BRAIN SURGERY      DILATION AND CURETTAGE, DIAGNOSTIC / THERAPEUTIC           FAMILY HISTORY  Family History   Adopted: Yes   Problem Relation Age of Onset    No Known Problems Mother     No Known Problems Father     No Known Problems Maternal Grandmother     No Known Problems Maternal Grandfather     No Known Problems Paternal Grandmother     No Known Problems Paternal Grandfather          SOCIAL HISTORY  Social History     Socioeconomic History    Marital status:    Tobacco Use    Smoking status: Never    Smokeless tobacco: Never   Vaping Use    Vaping Use: Never used   Substance and Sexual Activity    Alcohol use: No    Drug use: No    Sexual activity: Yes     Partners: Male     Birth control/protection:  Surgical         ALLERGIES  Wellbutrin [bupropion]        REVIEW OF SYSTEMS  Review of Systems       All systems reviewed and negative except for those discussed in HPI.       PHYSICAL EXAM    I have reviewed the triage vital signs and nursing notes.    ED Triage Vitals [08/09/23 2125]   Temp Heart Rate Resp BP SpO2   97.7 øF (36.5 øC) 71 20 133/81 97 %      Temp src Heart Rate Source Patient Position BP Location FiO2 (%)   Oral Monitor Lying Left arm --       Physical Exam  GENERAL:   Appears in no acute distress.   HENT: Nares patent.  EYES: No scleral icterus.  CV: Regular rhythm, regular rate.  RESPIRATORY: Normal effort.  No audible wheezes, rales or rhonchi.  ABDOMEN: Soft, nontender  MUSCULOSKELETAL: No deformities.   NEURO: Alert, moves all extremities, follows commands.  SKIN: Warm, dry, no rash visualized.      LAB RESULTS  Recent Results (from the past 24 hour(s))   ECG 12 Lead ED Triage Standing Order; Chest Pain    Collection Time: 08/09/23  9:24 PM   Result Value Ref Range    QT Interval 448 ms    QTC Interval 473 ms   High Sensitivity Troponin T    Collection Time: 08/09/23  9:27 PM    Specimen: Blood   Result Value Ref Range    HS Troponin T 9 <10 ng/L   Comprehensive Metabolic Panel    Collection Time: 08/09/23  9:27 PM    Specimen: Blood   Result Value Ref Range    Glucose 114 (H) 65 - 99 mg/dL    BUN 7 6 - 20 mg/dL    Creatinine 0.66 0.57 - 1.00 mg/dL    Sodium 145 136 - 145 mmol/L    Potassium 3.7 3.5 - 5.2 mmol/L    Chloride 110 (H) 98 - 107 mmol/L    CO2 25.0 22.0 - 29.0 mmol/L    Calcium 8.8 8.6 - 10.5 mg/dL    Total Protein 6.4 6.0 - 8.5 g/dL    Albumin 3.7 3.5 - 5.2 g/dL    ALT (SGPT) 24 1 - 33 U/L    AST (SGOT) 23 1 - 32 U/L    Alkaline Phosphatase 86 39 - 117 U/L    Total Bilirubin <0.2 0.0 - 1.2 mg/dL    Globulin 2.7 gm/dL    A/G Ratio 1.4 g/dL    BUN/Creatinine Ratio 10.6 7.0 - 25.0    Anion Gap 10.0 5.0 - 15.0 mmol/L    eGFR 110.4 >60.0 mL/min/1.73   Lipase    Collection Time: 08/09/23   9:27 PM    Specimen: Blood   Result Value Ref Range    Lipase 40 13 - 60 U/L   BNP    Collection Time: 08/09/23  9:27 PM    Specimen: Blood   Result Value Ref Range    proBNP 64.8 0.0 - 450.0 pg/mL   Green Top (Gel)    Collection Time: 08/09/23  9:27 PM   Result Value Ref Range    Extra Tube Hold for add-ons.    Lavender Top    Collection Time: 08/09/23  9:27 PM   Result Value Ref Range    Extra Tube hold for add-on    Gold Top - SST    Collection Time: 08/09/23  9:27 PM   Result Value Ref Range    Extra Tube Hold for add-ons.    Gray Top    Collection Time: 08/09/23  9:27 PM   Result Value Ref Range    Extra Tube Hold for add-ons.    Light Blue Top    Collection Time: 08/09/23  9:27 PM   Result Value Ref Range    Extra Tube Hold for add-ons.    CBC Auto Differential    Collection Time: 08/09/23  9:27 PM    Specimen: Blood   Result Value Ref Range    WBC 8.62 3.40 - 10.80 10*3/mm3    RBC 4.08 3.77 - 5.28 10*6/mm3    Hemoglobin 12.2 12.0 - 15.9 g/dL    Hematocrit 38.4 34.0 - 46.6 %    MCV 94.1 79.0 - 97.0 fL    MCH 29.9 26.6 - 33.0 pg    MCHC 31.8 31.5 - 35.7 g/dL    RDW 13.5 12.3 - 15.4 %    RDW-SD 46.3 37.0 - 54.0 fl    MPV 9.7 6.0 - 12.0 fL    Platelets 347 140 - 450 10*3/mm3    Neutrophil % 56.9 42.7 - 76.0 %    Lymphocyte % 31.4 19.6 - 45.3 %    Monocyte % 5.6 5.0 - 12.0 %    Eosinophil % 4.6 0.3 - 6.2 %    Basophil % 1.2 0.0 - 1.5 %    Immature Grans % 0.3 0.0 - 0.5 %    Neutrophils, Absolute 4.90 1.70 - 7.00 10*3/mm3    Lymphocytes, Absolute 2.71 0.70 - 3.10 10*3/mm3    Monocytes, Absolute 0.48 0.10 - 0.90 10*3/mm3    Eosinophils, Absolute 0.40 0.00 - 0.40 10*3/mm3    Basophils, Absolute 0.10 0.00 - 0.20 10*3/mm3    Immature Grans, Absolute 0.03 0.00 - 0.05 10*3/mm3    nRBC 0.0 0.0 - 0.2 /100 WBC   D-dimer, Quantitative    Collection Time: 08/09/23  9:27 PM    Specimen: Blood   Result Value Ref Range    D-Dimer, Quantitative 0.29 0.00 - 0.50 MCGFEU/mL   Lactic Acid, Plasma    Collection Time: 08/09/23  9:27 PM     Specimen: Blood   Result Value Ref Range    Lactate 1.4 0.5 - 2.0 mmol/L   C-reactive Protein    Collection Time: 08/09/23  9:27 PM    Specimen: Blood   Result Value Ref Range    C-Reactive Protein 1.38 (H) 0.00 - 0.50 mg/dL   Magnesium    Collection Time: 08/09/23  9:27 PM    Specimen: Blood   Result Value Ref Range    Magnesium 1.9 1.6 - 2.6 mg/dL   Phosphorus    Collection Time: 08/09/23  9:27 PM    Specimen: Blood   Result Value Ref Range    Phosphorus 2.6 2.5 - 4.5 mg/dL   TSH    Collection Time: 08/09/23  9:27 PM    Specimen: Blood   Result Value Ref Range    TSH 0.044 (L) 0.270 - 4.200 uIU/mL   T4, Free    Collection Time: 08/09/23  9:27 PM    Specimen: Blood   Result Value Ref Range    Free T4 0.24 (L) 0.93 - 1.70 ng/dL   COVID-19, FLU A/B, RSV PCR - Swab, Nasopharynx    Collection Time: 08/09/23  9:56 PM    Specimen: Nasopharynx; Swab   Result Value Ref Range    COVID19 Not Detected Not Detected - Ref. Range    Influenza A PCR Not Detected Not Detected    Influenza B PCR Not Detected Not Detected    RSV, PCR Not Detected Not Detected   Urinalysis With Microscopic If Indicated (No Culture) - Urine, Clean Catch    Collection Time: 08/09/23 10:16 PM    Specimen: Urine, Clean Catch   Result Value Ref Range    Color, UA Yellow Yellow, Straw    Appearance, UA Clear Clear    pH, UA 6.0 5.0 - 8.0    Specific Gravity, UA 1.013 1.001 - 1.030    Glucose, UA Negative Negative    Ketones, UA Negative Negative    Bilirubin, UA Negative Negative    Blood, UA Negative Negative    Protein, UA Negative Negative    Leuk Esterase, UA Trace (A) Negative    Nitrite, UA Negative Negative    Urobilinogen, UA 0.2 E.U./dL 0.2 - 1.0 E.U./dL   Urinalysis, Microscopic Only - Urine, Clean Catch    Collection Time: 08/09/23 10:16 PM    Specimen: Urine, Clean Catch   Result Value Ref Range    RBC, UA 0-2 None Seen, 0-2 /HPF    WBC, UA 3-5 (A) None Seen, 0-2 /HPF    Bacteria, UA None Seen None Seen, Trace /HPF    Squamous Epithelial  Cells, UA 0-2 None Seen, 0-2 /HPF    Hyaline Casts, UA 0-6 0 - 6 /LPF    Methodology Automated Microscopy    POC Urine Pregnancy    Collection Time: 08/09/23 10:16 PM    Specimen: Urine   Result Value Ref Range    HCG, Urine, QL Negative Negative    Lot Number [id number]     Internal Positive Control Positive Positive, Passed    Internal Negative Control Negative Negative, Passed    Expiration Date 2025-01-03    High Sensitivity Troponin T 2Hr    Collection Time: 08/09/23 11:34 PM    Specimen: Blood   Result Value Ref Range    HS Troponin T 11 (H) <10 ng/L    Troponin T Delta 2 >=-4 - <+4 ng/L   ECG 12 Lead ED Triage Standing Order; Chest Pain    Collection Time: 08/09/23 11:36 PM   Result Value Ref Range    QT Interval 458 ms    QTC Interval 494 ms       If labs were ordered, I independently reviewed the results and considered them in treating the patient.        RADIOLOGY  XR Chest 1 View    Result Date: 8/9/2023  XR CHEST 1 VW Date of Exam: 8/9/2023 9:24 PM EDT Indication: Chest Pain Triage Protocol Comparison: CT 7/27/2023. FINDINGS: No focal airspace opacity. No pleural effusion or pneumothorax. Normal heart and mediastinal contours.     No evidence of acute disease in the chest. Electronically Signed: Nelson Oakley  8/9/2023 9:47 PM EDT  Workstation ID: [id number]     I ordered and independently reviewed the above noted radiographic studies.      I viewed images of chest x-ray which does not show any acute pathology that I can appreciate.    See radiologist's dictation for official interpretation.        PROCEDURES    Procedures    ECG 12 Lead ED Triage Standing Order; Chest Pain   Preliminary Result   Test Reason : ED Triage Standing Order~   Blood Pressure :   */*   mmHG   Vent. Rate :  70 BPM     Atrial Rate :  70 BPM      P-R Int : 154 ms          QRS Dur :  86 ms       QT Int : 458 ms       P-R-T Axes :  13  -2  -5 degrees      QTc Int : 494 ms      Normal sinus rhythm   Voltage criteria for left ventricular  hypertrophy   ST & T wave abnormality, consider anterior ischemia   Prolonged QT   Abnormal ECG   When compared with ECG of 09-AUG-2023 21:24, (Unconfirmed)   No significant change was found      Referred By: EDMD           Confirmed By:       ECG 12 Lead ED Triage Standing Order; Chest Pain   Preliminary Result   Test Reason : CP   Blood Pressure :   */*   mmHG   Vent. Rate :  67 BPM     Atrial Rate :  67 BPM      P-R Int : 168 ms          QRS Dur :  88 ms       QT Int : 448 ms       P-R-T Axes :  30   4 -15 degrees      QTc Int : 473 ms      Normal sinus rhythm   Moderate voltage criteria for LVH, may be normal variant   ST & T wave abnormality, consider anterolateral ischemia   Abnormal ECG   When compared with ECG of 27-JUL-2023 13:48,   No significant change was found      Referred By: ED MD           Confirmed By:           MEDICATIONS GIVEN IN ER    Medications   sodium chloride 0.9 % flush 10 mL (has no administration in time range)   aspirin chewable tablet 324 mg (324 mg Oral Not Given 8/9/23 2159)   nitroglycerin (TRIDIL) 200 mcg/ml infusion (5 mcg/min Intravenous Rate/Dose Change 8/10/23 0130)   sodium chloride 0.9 % infusion (125 mL/hr Intravenous Rate/Dose Verify 8/10/23 0119)   acetaminophen (TYLENOL) tablet 650 mg (650 mg Oral Given 8/10/23 0134)   sodium chloride 0.9 % flush 10 mL (10 mL Intravenous Given 8/10/23 0135)   sodium chloride 0.9 % flush 10 mL (has no administration in time range)   sodium chloride 0.9 % infusion 40 mL (has no administration in time range)   Potassium Replacement - Follow Nurse / BPA Driven Protocol (has no administration in time range)   Magnesium Standard Dose Replacement - Follow Nurse / BPA Driven Protocol (has no administration in time range)   Phosphorus Replacement - Follow Nurse / BPA Driven Protocol (has no administration in time range)   Calcium Replacement - Follow Nurse / BPA Driven Protocol (has no administration in time range)   HYDROcodone-acetaminophen  (NORCO) 5-325 MG per tablet 1 tablet (has no administration in time range)   morphine injection 1 mg (has no administration in time range)     And   naloxone (NARCAN) injection 0.4 mg (has no administration in time range)   melatonin tablet 5 mg (has no administration in time range)   ondansetron (ZOFRAN) injection 4 mg (has no administration in time range)   ARIPiprazole (ABILIFY) tablet 5 mg (5 mg Oral Given 8/10/23 0134)   desvenlafaxine (PRISTIQ) 24 hr tablet 50 mg (50 mg Oral Given 8/10/23 0203)   empagliflozin (JARDIANCE) tablet 10 mg (has no administration in time range)   levothyroxine (SYNTHROID, LEVOTHROID) tablet 112 mcg (has no administration in time range)   lisinopril (PRINIVIL,ZESTRIL) tablet 2.5 mg (has no administration in time range)   nitroglycerin (NITROSTAT) SL tablet 0.4 mg (has no administration in time range)   atorvastatin (LIPITOR) tablet 20 mg (20 mg Oral Given 8/10/23 0134)   aspirin EC tablet 81 mg (has no administration in time range)         MEDICAL DECISION MAKING, PROGRESS, and CONSULTS    All labs have been independently reviewed by me.  All radiology studies have been reviewed by me and the radiologist dictating the report.  All EKG's have been independently viewed and interpreted by me/my attending physician.      Discussion below represents my analysis of pertinent findings related to patient's condition, differential diagnosis, treatment plan and final disposition.      Differential diagnosis:    Myocardial infarction, angina, arrhythmia, pulmonary embolism, aortic dissection, pneumonia, pneumothorax, GERD, sepsis, anemia, electrolyte abnormality      Additional sources:    - Discussed/ obtained information from independent historians: Spouse    - External (non-ED) record review: Cardiology notes from yesterday actually for chest pain, heart failure with reduced ejection fraction.    - Chronic or social conditions impacting care: Obesity, hypothyroidism, heart failure,    -  Shared decision making: Agreeable to hospital admission      Orders placed during this visit:  Orders Placed This Encounter   Procedures    COVID PRE-OP / PRE-PROCEDURE SCREENING ORDER (NO ISOLATION) - Swab, Nasopharynx    COVID-19, FLU A/B, RSV PCR - Swab, Nasopharynx    Urine Culture - Urine,    XR Chest 1 View    [facility] Draw    High Sensitivity Troponin T    Comprehensive Metabolic Panel    Lipase    BNP    CBC Auto Differential    Urinalysis With Microscopic If Indicated (No Culture) - Urine, Clean Catch    D-dimer, Quantitative    Lactic Acid, Plasma    C-reactive Protein    Magnesium    Phosphorus    TSH    T4, Free    High Sensitivity Troponin T 2Hr    Urinalysis, Microscopic Only - Urine, Clean Catch    Comprehensive Metabolic Panel    Magnesium    Lipid Panel    Hemoglobin A1c    High Sensitivity Troponin T    CBC Auto Differential    Cortisol    T3, Free    Thyrotropin Releas. Hormone    NPO Diet NPO Type: Sips with Meds, Ice Chips    Undress & Gown    Continuous Pulse Oximetry    Vital Signs    Intake & Output    Weigh Patient    Oral Care    Place Sequential Compression Device    Maintain Sequential Compression Device    Telemetry - Maintain IV Access    Telemetry - Place Orders & Notify Provider of Results When Patient Experiences Acute Chest Pain, Dysrhythmia or Respiratory Distress    May Be Off Telemetry for Tests    Up With Assistance    Code Status and Medical Interventions:    Inpatient Cardiology Consult    Oxygen Therapy- Nasal Cannula; Titrate 1-6 LPM Per SpO2; 90 - 95%    POC Urine Pregnancy    ECG 12 Lead ED Triage Standing Order; Chest Pain    ECG 12 Lead ED Triage Standing Order; Chest Pain    Adult Transthoracic Echo Complete W/ Cont if Necessary Per Protocol    Insert Peripheral IV    Insert Peripheral IV    Initiate Observation Status    ED Bed Request    CBC & Differential    Green Top (Gel)    Lavender Top    Gold Top - SST    Gray Top    Light Blue Top         Additional orders  considered but not ordered:      ED Course:    Consultants:      ED Course as of 08/10/23 0337   Wed Aug 09, 2023   2142 ECG shows normal sinus rhythm with diffuse anterolateral T wave inversions but no acute ST elevations or depressions, normal axis. [CC]   2143 In summary is a very nice 43-year-old woman who comes the emergency department for ongoing chest pain.  She said the pain started around 8 PM she was sitting down in her bed when she suddenly had severe sharp tight substernal chest pain that began radiating to the bilateral axillas.  She then had [CC]   2143  nausea and vomiting.  She tried to take nitroglycerin at home but emesis.  She contacted EMS they gave her nitroglycerin which improved her  had chest pain significantly and she now rates it as mild.  Is not having nausea at this time either.  Denies recent illness or fevers.  Denies leg swelling or immobilization.  No other complaints at this time. [CC]   2146 She arrived awake and alert pain has improved significantly.  T wave inversions on ECG.  Multiple concerning features including vomiting, radiation to the arms, and improvement with nitroglycerin.  Started on nitroglycerin drip here and treating with aspirin.  Obtaining full cardiac workup added on D-dimer.  Will reevaluate pending initial workup. [CC]      ED Course User Index  [CC] Norman Cabrera MD   CBC reassuring and nonactionable.  Metabolic panel also reassuring and nonactionable.  Initial high-sensitivity troponin is not elevated though her pain is come on acutely and will need repeat delta.  Heart score is 6.  At this point I am concerned for her chest pain with EKG changes and concerning presentation.  She is agreeable to hospital admission.  Medicine team consulted for admission.  TSH shows profound hypothyroidism her free T4 is 0.24.  I discussed this with her she says she has been compliant with her levothyroxine so may need dose med adjustment.  Discussed with medicine  team.  Repeat troponin after admission is slightly uptrending now elevated at 11.           Shared Decision Making:  After my consideration of clinical presentation and any laboratory/radiology studies obtained, I discussed the findings with the patient/patient representative who is in agreement with the treatment plan and the final disposition.   Risks and benefits of discharge and/or observation/admission were discussed.       AS OF 03:37 EDT VITALS:    BP - 100/68  HR - 67  TEMP - 97.7 øF (36.5 øC) (Oral)  O2 SATS - 97%                  DIAGNOSIS  Final diagnoses:   Chest pain, unspecified type   Troponin level elevated   Acquired hypothyroidism   Hyperlipidemia, unspecified hyperlipidemia type   Class 3 severe obesity with body mass index (BMI) of 45.0 to 49.9 in adult, unspecified obesity type, unspecified whether serious comorbidity present         DISPOSITION  Admit      Please note that portions of this document were completed with voice recognition software.        Norman Cabrera MD  08/10/23 0348

## 2023-08-10 NOTE — CASE MANAGEMENT/SOCIAL WORK
Discharge Planning Assessment  [facility]     Patient Name: Delmi Glasgow  MRN: [id number]  Today's Date: 8/10/2023    Admit Date: 8/9/2023    Plan: home/family   Discharge Needs Assessment    No documentation.                  Discharge Plan       Row Name 08/10/23 1034       Plan    Plan home/family    Patient/Family in Agreement with Plan yes    Plan Comments Observation status: I talked with patient at the  and she lives in [place] with  and 13 y/o son. Patient's PCP: Jack Ofe, has coverage for meds,  works FT but patient doesn't.  helps her as needed but does the cooking, cleaning, shopping and laundry. No HH, uses a RW as needed, shower chair and home 02 at 2L with patient aids. Plan is home at time of discharge.    Final Discharge Disposition Code 01 - home or self-care                  Continued Care and Services - Admitted Since 8/9/2023    Coordination has not been started for this encounter.          Demographic Summary    No documentation.                  Functional Status    No documentation.                  Psychosocial    No documentation.                  Abuse/Neglect    No documentation.                  Legal    No documentation.                  Substance Abuse    No documentation.                  Patient Forms    No documentation.                     Enedina Kemp RN

## 2023-08-10 NOTE — PROGRESS NOTES
[facility] Medicine Services  ADMISSION FOLLOW-UP NOTE          Patient admitted after midnight, H&P by my partner performed earlier on today's date reviewed.  Interim findings, labs, and charting also reviewed.        The [facility] Problem List has been managed and updated to include any new diagnoses:  Active Hospital Problems    Diagnosis  POA    **Chest pain [R07.9]  Yes    Essential hypertension [I10]  Unknown    Troponin level elevated [R77.8]  Unknown    Hyperlipemia [E78.5]  Yes    Acquired hypothyroidism [E03.9]  Yes      Resolved Hospital Problems   No resolved problems to display.         ADDITIONAL PLAN:  - detailed assessment and plan from admission reviewed    -Pt without chest pain, plan for [facility] today     Expected Discharge   Expected Discharge Date: 8/11/2023; Expected Discharge Time:      Milka Blackmon DO  08/10/23

## 2023-08-10 NOTE — H&P
"    [facility] Medicine Services  HISTORY AND PHYSICAL    Patient Name: Delmi Glasgow  : 1980  MRN: [id number]  Primary Care Physician: Prosper Thakur DO  Date of admission: 2023      Subjective   Subjective     Chief Complaint:  Chest pain    HPI:  Delmi Glasgow is a 43 y.o. female who states that around 9 PM earlier tonight (Wednesday) she noticed onset of substernal/central chest pain which she describes as \"stabbing,\" and \"like a cramping on either side\" of the sternum.  She states she had occasional simultaneous pain in both axillae, but she can cite no exacerbating or alleviating factors.  She states that the pain was present until arrival and treatment in the ED here.  She confirms an episode of nausea with emesis (no jono blood).  She denies any diaphoresis or syncope.  She states her pain, at its worst, \"was an 8 out of 10\" but also notes that after administration of sublingual nitro, \"it went down to about 5.\"  She states her pain is resolved now with initiation of nitroglycerin drip.  She also adds that she actually saw her cardiologist earlier today for her first appointment, and she was started on Jardiance therefore history of CHF, but during that visit she had no C/O any chest pain or dyspnea.  She denies fever/chills, abdominal pain, bowel habit change, any head or chest trauma of late, hemoptysis, palpitations, focal neurologic deficit, or syncope.  Her medical history is significant for hypertension, hypothyroidism, and congestive heart failure; she does not know the reason for CHF, denying any other cardiac history.  She also notes that she had a pituitary tumor removed at  in , and she follows with an endocrinologist there.      Review of Systems   Respiratory:  Positive for shortness of breath.    Cardiovascular:  Positive for chest pain.   Gastrointestinal:  Positive for nausea and vomiting.   All other systems reviewed and are negative.       Personal " History     Past Medical History:   Diagnosis Date    CHF (congestive heart failure)     Depression     Elevated cholesterol     Hyperlipemia     Hypothyroid     Sleep apnea              Past Surgical History:   Procedure Laterality Date    BRAIN SURGERY      DILATION AND CURETTAGE, DIAGNOSTIC / THERAPEUTIC         Family History: family history includes No Known Problems in her father, maternal grandfather, maternal grandmother, mother, paternal grandfather, and paternal grandmother. She was adopted.     Social History:  reports that she has never smoked. She has never used smokeless tobacco. She reports that she does not drink alcohol and does not use drugs.  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 PT IS  WITH 4 CHILDREN AGES [age],11,5.        Caffeine: 1 soda daily, occasional tea-- 3-4x per wk       Medications:  Available home medication information reviewed.  Medications Prior to Admission   Medication Sig Dispense Refill Last Dose    ARIPiprazole (ABILIFY) 5 MG tablet Take 1 tablet by mouth Every Night.   8/9/2023    desvenlafaxine (PRISTIQ) 50 MG 24 hr tablet Take 1 tablet by mouth Every Night.   8/9/2023    empagliflozin (JARDIANCE) 10 MG tablet tablet Take 1 tablet by mouth Daily. 30 tablet 1 8/9/2023    FENOFIBRATE PO Take 145 mg by mouth Daily.   8/9/2023    levothyroxine (SYNTHROID, LEVOTHROID) 112 MCG tablet Take 1 tablet by mouth Daily.   8/9/2023    lisinopril (PRINIVIL,ZESTRIL) 2.5 MG tablet Take 1 tablet by mouth Daily.   8/9/2023    nitroglycerin (NITROSTAT) 0.4 MG SL tablet Place 1 tablet under the tongue Every 5 (Five) Minutes As Needed for Chest Pain. Take no more than 3 doses in 15 minutes. 100 tablet 0 8/9/2023    naproxen (NAPROSYN) 500 MG tablet Take 1 tablet by mouth 2 (Two) Times a Day With Meals.          Allergies   Allergen Reactions    Wellbutrin [Bupropion] Hives       Objective   Objective     Vital Signs:   Temp:  [97.7 øF (36.5  øC)-99.2 øF (37.3 øC)] 97.7 øF (36.5 øC)  Heart Rate:  [67-90] 74  Resp:  [20] 20  BP: (109-133)/(74-96) 120/80       Physical Exam   Constitutional: Awake, alert, NAD, pleasant.  Eyes: PERRLA, sclerae anicteric, no conjunctival injection  HENT: NCAT, mucous membranes moist  Neck: Supple, no thyromegaly, no lymphadenopathy, trachea midline  Respiratory: Clear to auscultation bilaterally, nonlabored respirations   Cardiovascular: RRR, no murmurs, rubs, or gallops, palpable pedal pulses bilaterally  Gastrointestinal: Positive bowel sounds, soft, nontender, nondistended  Musculoskeletal: No bilateral ankle edema, no clubbing or cyanosis to extremities  Psychiatric: Appropriate affect, cooperative  Neurologic: Oriented x 3, strength symmetric in all extremities, Cranial Nerves grossly intact to confrontation, speech clear  Skin: No rashes, normal turgor.    Result Review:  I have personally reviewed the results from the time of this admission to 8/10/2023 00:52 EDT and agree with these findings:  [x]  Laboratory list / accordion  []  Microbiology  [x]  Radiology  [x]  EKG/Telemetry   []  Cardiology/Vascular   []  Pathology  []  Old records  []  Other:  Most notable findings include: I reviewed chest x-ray which by my read shows no acute infiltrate or edema on this single AP view.  I reviewed EKG which by my read shows sinus rhythm, ventricular rate approximately 70 bpm, normal axis, prolonged QT, nonspecific ST/T wave changes.        LAB RESULTS:      Lab 08/09/23 2127   WBC 8.62   HEMOGLOBIN 12.2   HEMATOCRIT 38.4   PLATELETS 347   NEUTROS ABS 4.90   IMMATURE GRANS (ABS) 0.03   LYMPHS ABS 2.71   MONOS ABS 0.48   EOS ABS 0.40   MCV 94.1   CRP 1.38*   LACTATE 1.4   D DIMER QUANT 0.29         Lab 08/09/23 2127   SODIUM 145   POTASSIUM 3.7   CHLORIDE 110*   CO2 25.0   ANION GAP 10.0   BUN 7   CREATININE 0.66   EGFR 110.4   GLUCOSE 114*   CALCIUM 8.8   MAGNESIUM 1.9   PHOSPHORUS 2.6   TSH 0.044*         Lab 08/09/23 2127    TOTAL PROTEIN 6.4   ALBUMIN 3.7   GLOBULIN 2.7   ALT (SGPT) 24   AST (SGOT) 23   BILIRUBIN <0.2   ALK PHOS 86   LIPASE 40         Lab 08/09/23  2334 08/09/23 2127   PROBNP  --  64.8   HSTROP T 11* 9             Lab 08/04/23  1119   FOLATE 10.0   VITAMIN B 12 448         UA          5/8/2023    20:12 7/8/2023    19:54 8/9/2023    22:16   Urinalysis   Squamous Epithelial Cells, UA 0-5     0-5     0-2    Specific Gravity, UA 1.013     1.015     1.013    Ketones, UA   Negative    Blood, UA Negative     Negative     Negative    Leukocytes, UA Trace     Small     Trace    Nitrite, UA   Negative    RBC, UA 1     2     0-2    WBC, UA   3-5    Bacteria, UA Present     Negative     None Seen       Details          This result is from an external source.               Microbiology Results (last 10 days)       Procedure Component Value - Date/Time    COVID PRE-OP / PRE-PROCEDURE SCREENING ORDER (NO ISOLATION) - Swab, Nasopharynx [[id number]]  (Normal) Collected: 08/09/23 2156    Lab Status: Final result Specimen: Swab from Nasopharynx Updated: 08/09/23 2243    Narrative:      The following orders were created for panel order COVID PRE-OP / PRE-PROCEDURE SCREENING ORDER (NO ISOLATION) - Swab, Nasopharynx.  Procedure                               Abnormality         Status                     ---------                               -----------         ------                     COVID-19, FLU A/B, RSV P...[[id number]]  Normal              Final result                 Please view results for these tests on the individual orders.    COVID-19, FLU A/B, RSV PCR - Swab, Nasopharynx [[id number]]  (Normal) Collected: 08/09/23 2156    Lab Status: Final result Specimen: Swab from Nasopharynx Updated: 08/09/23 2243     COVID19 Not Detected     Influenza A PCR Not Detected     Influenza B PCR Not Detected     RSV, PCR Not Detected    Narrative:      Fact sheet for providers: https://www.fda.gov/media/223888/download    Fact sheet for  patients: https://www.Disc[employer].gov/media/346568/download    Test performed by PCR.            XR Chest 1 View    Result Date: 8/9/2023  XR CHEST 1 VW Date of Exam: 8/9/2023 9:24 PM EDT Indication: Chest Pain Triage Protocol Comparison: CT 7/27/2023. FINDINGS: No focal airspace opacity. No pleural effusion or pneumothorax. Normal heart and mediastinal contours.     Impression: No evidence of acute disease in the chest. Electronically Signed: Nelson Oakley  8/9/2023 9:47 PM EDT  Workstation ID: [id number]         Assessment & Plan   Assessment & Plan     Active Hospital Problems    Diagnosis  POA    **Chest pain [R07.9]  Yes       43F with chest pain    Chest pain  - Check a.m. troponin.  - Check 2D echo.  - Check HbA1c, TSH (already checked), lipid panel.  - Continue telemetry.  - Daily aspirin and statin.  - Continue lisinopril from home regimen for now.  - Continue nitroglycerin drip started in the ED.  - Cardiology consult requested, input appreciated, will follow up the recommendations.    Hypothyroidism  - This is almost certainly secondary to previous surgery to remove pituitary tumor, per her report.  - Both TSH and T4 are below normal.  - We will check TRH, T3, cortisol level.  - We will defer any therapy with IV levothyroxine/liothyronine at this time, as she is not hypothermic, no mental status change/obtundation, not hyponatremic, and heart rate is normal.  - Follows with endocrinologist at .  - Continue Synthroid from home regimen; she states she does take generic levothyroxine for this, and she confirms she takes it in the morning 45 minutes before any oral intake of food or fluids.    Hypertension  - Continue lisinopril from home regimen.  - Currently on nitroglycerin drip as well.    History of CHF  - We will check 2D echo as above.  - Continue telemetry.  - No signs or symptoms of fluid overload at this time; not currently in exacerbation.  - Continue lisinopril from home regimen.  - Will be seen by the  cardiology service.  - Continue Jardiance from home regimen, which she was actually supposed to start today, as prescribed by her cardiologist she saw her earlier this afternoon.        Total time spent: 81 minutes  Time spent includes time reviewing chart, face-to-face time, counseling patient/family/caregiver, ordering medications/tests/procedures, communicating with other health care professionals, documenting clinical information in the electronic health record, and coordination of care.     DVT prophylaxis:  scds      CODE STATUS:  full  Code Status and Medical Interventions:   Ordered at: 08/10/23 0048     Level Of Support Discussed With:    Patient     Code Status (Patient has no pulse and is not breathing):    CPR (Attempt to Resuscitate)     Medical Interventions (Patient has pulse or is breathing):    Full Support       Expected Discharge tbd    Juma Benavides,III, DO  08/10/23

## 2023-08-10 NOTE — CONSULTS
Date of Hospital Visit: 08/10/23  Encounter Provider: MALIKA Smith  Place of Service: [facility]  Patient Name: Delmi Glasgow  :1980  Referral Provider: Yury Barclay MD  Primary Care Provider: Prosper Thakur DO    Chief complaint/Reason for Consultation: Chest pain    Problem List:  Chest pain  Stress PET 2020: normal myocardial perfusion. Rest EF 54%, stress EF 59%.  Stress with myocardial perfusion (2023): normal nuclear stress test.  LVEF: 45 - 49%.   CTA chest (2023): No coronary artery calcifications  HFrEF  LVEF: 45 - 49% per stress test   Hypertension  Hyperlipidemia   History of palpitations  72 hour holter (2020): normal  Hypothyroidism  Sleep apnea   Morbid obesity, BMI 46  Cholelithiasis noted on CT chest    History of Present Illness:  Patient is a 43 year old female with the above past medical history who we are asked to see in consultation today for chest pain. She has had multiple ED visits over the last several months with complaints of chest pain. She had a stress test 2023 at  that revealed mildly reduced EF. She did not have any further ischemic evaluation. She has continued to have episodes of chest heaviness. She states that last night she was sitting down when she had sudden onset of sharp chest pain that radiated across her chest and into her axilla. She took one SL nitro and this did not help. She developed sudden onset of nausea and vomiting and ultibametle decided to come to the ED. Troponin was mildly elevated with a negative delta but she does have ST-T changes concerning for anterior ischemia. She is currently chest pain free but is on nitroglycerin drip.       Past Medical History:   Diagnosis Date    CHF (congestive heart failure)     Depression     Elevated cholesterol     Hyperlipemia     Hypothyroid     Sleep apnea        Past Surgical History:   Procedure Laterality Date    BRAIN SURGERY      DILATION AND CURETTAGE, DIAGNOSTIC /  THERAPEUTIC         Medications Prior to Admission   Medication Sig Dispense Refill Last Dose    ARIPiprazole (ABILIFY) 5 MG tablet Take 1 tablet by mouth Every Night.   8/9/2023    desvenlafaxine (PRISTIQ) 50 MG 24 hr tablet Take 1 tablet by mouth Every Night.   8/9/2023    empagliflozin (JARDIANCE) 10 MG tablet tablet Take 1 tablet by mouth Daily. 30 tablet 1 8/9/2023    FENOFIBRATE PO Take 145 mg by mouth Daily.   8/9/2023    levothyroxine (SYNTHROID, LEVOTHROID) 112 MCG tablet Take 1 tablet by mouth Daily.   8/9/2023    lisinopril (PRINIVIL,ZESTRIL) 2.5 MG tablet Take 1 tablet by mouth Daily.   8/9/2023    nitroglycerin (NITROSTAT) 0.4 MG SL tablet Place 1 tablet under the tongue Every 5 (Five) Minutes As Needed for Chest Pain. Take no more than 3 doses in 15 minutes. 100 tablet 0 8/9/2023    naproxen (NAPROSYN) 500 MG tablet Take 1 tablet by mouth 2 (Two) Times a Day With Meals.          Social History     Socioeconomic History    Marital status:    Tobacco Use    Smoking status: Never    Smokeless tobacco: Never   Vaping Use    Vaping Use: Never used   Substance and Sexual Activity    Alcohol use: No    Drug use: No    Sexual activity: Yes     Partners: Male     Birth control/protection: Surgical       Family History   Adopted: Yes   Problem Relation Age of Onset    No Known Problems Mother     No Known Problems Father     No Known Problems Maternal Grandmother     No Known Problems Maternal Grandfather     No Known Problems Paternal Grandmother     No Known Problems Paternal Grandfather        REVIEW OF SYSTEMS:     12 point ROS was performed and is Negative except as outlined in HPI     Objective:     Vitals:    08/10/23 0600 08/10/23 0630 08/10/23 0700 08/10/23 0822   BP:  113/76 108/78 112/75   BP Location:       Patient Position:       Pulse: 61  59 68   Resp:       Temp:       TempSrc:       SpO2: 99%      Weight:       Height:         Body mass index is 46.08 kg/mý.  Flowsheet Rows      Flowsheet  "Row First Filed Value   Admission Height 160 cm (63\") Documented at 08/09/2023 2125   Admission Weight 117 kg (258 lb) Documented at 08/09/2023 2125            Physical Exam   General: well developed and well nourished.    Skin: Skin is warm and dry. No obvious cyanosis, erythema or pallor.   HEENT: Atraumatic, normocephalic, no conjunctival pallor, no scleral icterus.   Neck: Supple, no JVD. Normal carotid upstrokes, no bruits.    Chest:No respiratory distress. No chest wall tenderness. Breath sounds are normal. No wheezes, rhonchi or rales.  Cardiovascular: Normal S1 and S2, no murmur, gallop or rub. PMI is not displaced.    Pulses:Radial and pedal pulses are 2+ and symmetric.    Right allens test: normal  Abdomen: Soft, nontender, normal bowel sounds.   Musculoskeletal/Extremities:  No clubbing, cyanosis or edema. No gross deformity.   Neurological: Alert and oriented to person, place, and time, no gross focal deficits.   Psychiatric: Normal mood and affect.Speech and behavior is normal.    Lab Review:                Results from last 7 days   Lab Units 08/10/23  0546   SODIUM mmol/L 142   POTASSIUM mmol/L 4.0   CHLORIDE mmol/L 109*   CO2 mmol/L 24.0   BUN mg/dL 5*   CREATININE mg/dL 0.63   GLUCOSE mg/dL 81   CALCIUM mg/dL 8.4*     Results from last 7 days   Lab Units 08/10/23  0546 08/09/23  2334 08/09/23 2127   HSTROP T ng/L 8 11* 9     Results from last 7 days   Lab Units 08/10/23  0547   WBC 10*3/mm3 6.98   HEMOGLOBIN g/dL 11.4*   HEMATOCRIT % 35.6   PLATELETS 10*3/mm3 311         Results from last 7 days   Lab Units 08/10/23  0546   MAGNESIUM mg/dL 1.9     Results from last 7 days   Lab Units 08/10/23  0546   CHOLESTEROL mg/dL 203*   TRIGLYCERIDES mg/dL 115   HDL CHOL mg/dL 42   LDL CHOL mg/dL 140*     @LABRCNT(bnp)@  Imaging Results (Last 24 Hours)       Procedure Component Value Units Date/Time    XR Chest 1 View [[id number]] Collected: 08/09/23 2146     Updated: 08/09/23 2150    Narrative:      XR CHEST 1 " VW    Date of Exam: 8/9/2023 9:24 PM EDT    Indication: Chest Pain Triage Protocol    Comparison: CT 7/27/2023.    FINDINGS: No focal airspace opacity. No pleural effusion or pneumothorax. Normal heart and mediastinal contours.      Impression:      No evidence of acute disease in the chest.     Electronically Signed: Nelson Oakley    8/9/2023 9:47 PM EDT    Workstation ID: [id number]             EKG (8/9/2023):   ent. Rate :  70 BPM     Atrial Rate :  70 BPM     P-R Int : 154 ms          QRS Dur :  86 ms      QT Int : 458 ms       P-R-T Axes :  13  -2  -5 degrees     QTc Int : 494 ms  Normal sinus rhythm  LVH, ST & T wave abnormality, prolonged QT. Unchanged from baseline    STRESS TEST (4/12/2023): normal nuclear stress test       Assessment:   Chest pain  Stress PET 1/29/2020: normal myocardial perfusion. Rest EF 54%, stress EF 59%.  Stress with myocardial perfusion (4/12/2023): normal nuclear stress test.  LVEF: 45 - 49%.   Essentially normal troponin with negative delta  EKG (8/9/2023): LVH, ST & T wave abnormality, prolonged QT. Unchanged from baseline  CTA chest (7/27/2023): No coronary artery calcifications  HFrEF   LVEF 45-49% per stress test in April at   Echo pending  Hypertension  Hyperlipidemia  History of palpitations  72 hour holter (1/28/23) normal  Hypothyroidism  Sleep apnea  Morbid obesity, BMI 46        Plan:   Due to multiple ED visits with chest pain, ECG with ischemic changes, and HFrEF, patient will benefit from LHC for ischemic evaluation. Plan for LHC via right radial approach for further evaluation of chest pain. Keep NPO.  Further recommendations to follow procedure.   Follow up on echo results.   Thank you for this consult. We will continue to follow.     MALIKA Smith obtained past medical, family history, social history, review of systems, and functioned as a scribe for the remainder of the dictation for Dr. Barclay.    Yury MORAN MD, personally performed the services  described in this documentation as scribed by the above named individual in my presence, and it is both accurate and complete.  8/10/2023  12:42 EDT

## 2023-08-11 ENCOUNTER — DOCUMENTATION (OUTPATIENT)
Dept: CARDIAC REHAB | Facility: HOSPITAL | Age: 43
End: 2023-08-11
Payer: COMMERCIAL

## 2023-08-11 LAB — BACTERIA SPEC AEROBE CULT: NO GROWTH

## 2023-08-15 LAB
QT INTERVAL: 448 MS
QT INTERVAL: 458 MS
QTC INTERVAL: 473 MS
QTC INTERVAL: 494 MS

## 2023-08-20 NOTE — DISCHARGE SUMMARY
[facility] Medicine Services  DISCHARGE SUMMARY    Patient Name: Delmi Glasgow  : 1980  MRN: [id number]    Date of Admission: 2023  9:20 PM  Date of Discharge:  8/10/23  Primary Care Physician: Prosper Thakur DO    Consults       Date and Time Order Name Status Description    8/10/2023 12:48 AM Inpatient Cardiology Consult Completed             Hospital Course     Presenting Problem: chest pain     Active Hospital Problems    Diagnosis  POA    **Chest pain [R07.9]  Yes    Essential hypertension [I10]  Unknown    Troponin level elevated [R77.8]  Unknown    Hyperlipemia [E78.5]  Yes    Acquired hypothyroidism [E03.9]  Yes      Resolved Hospital Problems   No resolved problems to display.          Hospital Course:  Delmi Glasgow is a 43 y.o. female th PMH of HTN HLD, hypothyroidism, BENJAMIN that presented with chest pain. Found to have St changes on EKG and was taken for LHC by cardiology. Found to have no CAD and EF 65%. Instructed to follow up with PCP and cardiology, by cardiology. Cardiology discharged patient.       Discharge Follow Up Recommendations for outpatient labs/diagnostics:   PCP  Cardiology     Day of Discharge     HPI:       Review of Systems      Vital Signs:          Physical Exam:      Pertinent  and/or Most Recent Results     LAB RESULTS:                              Brief Urine Lab Results  (Last result in the past 365 days)        Color   Clarity   Blood   Leuk Est   Nitrite   Protein   CREAT   Urine HCG        23 2216               Negative             Microbiology Results (last 10 days)       ** No results found for the last 240 hours. **            Adult Transthoracic Echo Complete W/ Cont if Necessary Per Protocol    Result Date: 8/10/2023    Left ventricular systolic function is normal. Estimated left ventricular EF = 52%   The cardiac valves are anatomically and functionally normal.     Cardiac Catheterization/Vascular Study    Result Date:  8/10/2023  FINAL     Angiographically normal coronary arteries. Normal left ventricular systolic function, estimated EF 65%. RECOMMENDATIONS: Medical therapy and risk factor management. Evaluation for noncardiac etiology of symptoms. Indications: Chest pain with features of unstable angina.  Multiple cardiac risk factors with multiple recent ER visits and inconclusive noninvasive testing. Access: Right radial Procedures: Left heart catheterization. Left ventriculogram. Selective coronary angiography. Arterial site hemostasis with radial band. Procedure narrative: The patient was brought to the catheterization lab in a fasting condition.  Access site was prepped and draped in standard sterile fashion.  Lidocaine was injected and arterial access was obtained by percutaneous anterior wall puncture technique.  A 6 Greek arterial sheath was placed. Above procedures were performed without complications.  At the conclusion the arterial sheath was removed and hemostasis was achieved.  The patient was transferred to the unit in a stable condition. Hemodynamic Findings: Heart Rate: 92/minute. LV pressure: 122/4-14 mmHg, on pull back no gradient was recorded across the aortic valve. Angiographic Findings: Left coronary dominance. LM: Angiographically normal. LAD: Angiographically normal. LCX: Dominant vessel, angiographically normal. RCA: Nondominant vessel, angiographically normal. LV: Left ventriculogram performed in 30 HENDRICKSON projection revealed normal global and regional left ventricular systolic function with estimated ejection fraction of 65%.  No mitral regurgitation was noted. Complications: No acute procedure related complications.     XR Chest 1 View    Result Date: 8/9/2023  XR CHEST 1 VW Date of Exam: 8/9/2023 9:24 PM EDT Indication: Chest Pain Triage Protocol Comparison: CT 7/27/2023. FINDINGS: No focal airspace opacity. No pleural effusion or pneumothorax. Normal heart and mediastinal contours.     No evidence of  acute disease in the chest. Electronically Signed: Nelson Oakley  8/9/2023 9:47 PM EDT  Workstation ID: [id number]             Results for orders placed during the hospital encounter of 08/09/23    Adult Transthoracic Echo Complete W/ Cont if Necessary Per Protocol    Interpretation Summary    Left ventricular systolic function is normal. Estimated left ventricular EF = 52%    The cardiac valves are anatomically and functionally normal.      Plan for Follow-up of Pending Labs/Results:  Pending Labs       Order Current Status    Thyrotropin Releas. Hormone In process          Discharge Details        Discharge Medications        New Medications        Instructions Start Date   pantoprazole 40 MG EC tablet  Commonly known as: Protonix   40 mg, Oral, Daily             Continue These Medications        Instructions Start Date   ARIPiprazole 5 MG tablet  Commonly known as: ABILIFY   5 mg, Oral, Nightly      desvenlafaxine 50 MG 24 hr tablet  Commonly known as: PRISTIQ   50 mg, Oral, Nightly      empagliflozin 10 MG tablet tablet  Commonly known as: JARDIANCE   10 mg, Oral, Daily      FENOFIBRATE PO   145 mg, Oral, Daily      levothyroxine 112 MCG tablet  Commonly known as: SYNTHROID, LEVOTHROID   112 mcg, Oral, Daily      lisinopril 2.5 MG tablet  Commonly known as: PRINIVIL,ZESTRIL   2.5 mg, Oral, Daily      naproxen 500 MG tablet  Commonly known as: NAPROSYN   500 mg, Oral, 2 Times Daily With Meals      nitroglycerin 0.4 MG SL tablet  Commonly known as: NITROSTAT   0.4 mg, Sublingual, Every 5 Minutes PRN, Take no more than 3 doses in 15 minutes.               Allergies   Allergen Reactions    Wellbutrin [Bupropion] Hives         Discharge Disposition:  Long Term Care (DC - External)    Diet:  Hospital:No active diet order      Activity:      Restrictions or Other Recommendations:         CODE STATUS:    Code Status and Medical Interventions:   Ordered at: 08/10/23 0048     Level Of Support Discussed With:    Patient      Code Status (Patient has no pulse and is not breathing):    CPR (Attempt to Resuscitate)     Medical Interventions (Patient has pulse or is breathing):    Full Support       Future Appointments   Date Time Provider Department Center   8/30/2023  1:30 PM Milka Calixto APRN [facility]   10/13/2023  2:00 PM Yury Barclay MD [facility]       Additional Instructions for the Follow-ups that You Need to Schedule       Discharge Follow-up with PCP   As directed       Currently Documented PCP:    Prosper Thakur DO    PCP Phone Number:    [phone]     Follow Up Details: in 1-2 weeks to evaluate for non cardiac causes of chest pain        Discharge Follow-up with Specialty: Dr Barclay; 6 Weeks   As directed      Specialty: Dr Barclay   Follow Up: 6 Weeks                      Milka Blackmon DO  08/20/23      Time Spent on Discharge:  I spent  5  minutes on this discharge activity which included: face-to-face encounter with the patient, reviewing the data in the system, coordination of the care with the nursing staff as well as consultants, documentation, and entering orders.

## 2023-08-30 ENCOUNTER — OFFICE VISIT (OUTPATIENT)
Dept: CARDIOLOGY | Facility: HOSPITAL | Age: 43
End: 2023-08-30
Payer: COMMERCIAL

## 2023-08-30 VITALS
TEMPERATURE: 95.1 F | WEIGHT: 256.6 LBS | DIASTOLIC BLOOD PRESSURE: 83 MMHG | OXYGEN SATURATION: 97 % | HEART RATE: 91 BPM | SYSTOLIC BLOOD PRESSURE: 128 MMHG | RESPIRATION RATE: 18 BRPM | BODY MASS INDEX: 45.46 KG/M2 | HEIGHT: 63 IN

## 2023-08-30 LAB — THYROTROPIN RELEASING HORM PLAS-MCNC: <4 PG/ML

## 2023-08-30 NOTE — PROGRESS NOTES
Chief Complaint  Chest Pain    Subjective      History of Present Illness {  Problem List  Visit  Diagnosis   Encounters  Notes  Medications  Labs  Result Review Imaging  Media :23}     Delmi Glasgow, 43 y.o. female with past medical history significant for hypertension, HFrEF, pituitary macroadenoma status postresection and radiation, hyperlipidemia, depression, and thyroid disease, who presents to [facility] for Chest Pain  Since time of previous evaluation, patient has had recent admission for chest pain.  Patient presented to [facility] on 8/9/2023 chief complaint of chest pain.  She was found to have ST changes on EKG and was taken for cardiac catheterization.  Cardiac catheterization on 8/10/2023 showed normal coronary arteries, EF 65%.  Echocardiogram on 8/10 showed EF 52%, cardiac valves are anatomically and functionally normal.      Since time of discharge, she denies any further chest pain. Denies shortness of air, palpations, syncope or near syncope, and fatigue.  She endorses occasional swelling to feet and ankles but overall swelling is controlled. Blood pressure at home is running 120/80. She has not noticed any significant weight gain recently.  She has been compliant with all medications and states that she is going to undergo evaluation of her gallbladder in near future.      Echocardiogram 8/10/2023:    Left ventricular systolic function is normal. Estimated left ventricular EF = 52%    The cardiac valves are anatomically and functionally normal.       Cardiac catheterization 8/10/2023:   FINAL IMPRESSION:  Angiographically normal coronary arteries.  Normal left ventricular systolic function, estimated EF 65%.     RECOMMENDATIONS:  Medical therapy and risk factor management.  Evaluation for noncardiac etiology of symptoms.      Initial presentation:  The patient presented to the ED on 7/27/2023 with chief complaint of chest pain.  At that time, the patient  stated she had midsternal chest pain with radiation to both armpits.  She reported associated dizziness and recent falls.  Of note she was seen at  the week prior also for chest pain.  While in [facility] ER, patient's high-sensitivity troponin initially 9 with repeat of 11.  CBC, CMP, BNP, and lipase all noted to be within normal limits.  EKG showed normal sinus rhythm, rate 73, voltage criteria for LVH, nonspecific ST and T wave abnormality, prolonged QT.  CTA of chest showed no vascular filling defect to suggest PE, hazy ill-defined bilateral infiltrates, and cholelithiasis.     Of note, patient was recently diagnosed with HFrEF while at .  She was initiated on GDMT after new diagnosis but she became hypotensive and required readmission.  Most recently, patient has been on low-dose lisinopril with other medications discontinued due to recent hypotension.    Since eval in our ED, chest pain has improved. Pain is described as squeezing under bilateral arms (in armpit area). Occurring a couple times a week. No other radiation. Lasting a couple hours. She endorses shortness of air which is not new for patient. She is on home O2 at 2L at night and as needed which has been more frequently recently. She endorses mild lower extremity edema and recent weight gain of approximately 6 pounds. She denies syncope or near syncope but does have frequent falls due to balance.  She follows up with [facility] therapy as a outpatient for balance.  She denies palpitations. Endorses fatigue.  Patient admits she is not adequately hydrated. She does endorse orthopnea at night.     Blood pressure at home is running similar to today while in office.  Approximately 120s over 70s.    She drinks 1 can of soda daily  No known family history of cardiac disease    SPECT stress test 4/12/2023:  This result has an attachment that is not available.     Baseline ECG: The baseline ECG shows normal sinus rhythm, normal axis,   nonspecific ST-T  "wave abnormality and T wave inversion in inferolateral   leads. Baseline ECG shows non-specific ST segment deviation.     Stress ECG: No ischemic ST segment changes occurred with stress.     Perfusion: SPECT images demonstrate normal myocardial perfusion.     Function: Normal left ventricular cavity size. Gated SPECT images   demonstrate low-normal systolic function. Regional wall motion is normal.   LV wall thickening appears concordantly normal.     LVEF: 45 - 49%.     Combined ECG/SPECT: This is a probably normal nuclear stress test.   There is no previous examination/report available for comparison or   correlation.       Objective     Vital Signs:   Vitals:    23 1319   BP: 128/83   BP Location: Left arm   Patient Position: Sitting   Cuff Size: Adult   Pulse: 91   Resp: 18   Temp: 95.1 øF (35.1 øC)   TempSrc: Temporal   SpO2: 97%   Weight: 116 kg (256 lb 9.6 oz)   Height: 160 cm (63\")     Body mass index is 45.45 kg/mý.  Physical Exam  Vitals and nursing note reviewed.   Constitutional:       Appearance: Normal appearance. She is obese.   HENT:      Head: Normocephalic.   Eyes:      Pupils: Pupils are equal, round, and reactive to light.   Cardiovascular:      Rate and Rhythm: Normal rate and regular rhythm.      Pulses: Normal pulses.      Heart sounds: Normal heart sounds. No murmur heard.  Pulmonary:      Effort: Pulmonary effort is normal.      Breath sounds: Normal breath sounds.   Abdominal:      General: Bowel sounds are normal.      Palpations: Abdomen is soft.   Musculoskeletal:         General: Normal range of motion.      Cervical back: Normal range of motion.      Right lower le+ No edema.      Left lower le+ No edema.   Skin:     General: Skin is warm and dry.      Capillary Refill: Capillary refill takes less than 2 seconds.   Neurological:      Mental Status: She is alert and oriented to person, place, and time.   Psychiatric:         Mood and Affect: Mood normal.         Thought " Content: Thought content normal.              Data Reviewed:{ Labs  Result Review  Imaging  Med Tab  Media :23}   Lab Results   Component Value Date    WBC 6.98 08/10/2023    HGB 11.4 (L) 08/10/2023    HCT 35.6 08/10/2023    MCV 93.2 08/10/2023     08/10/2023      Lab Results   Component Value Date    GLUCOSE 81 08/10/2023    BUN 5 (L) 08/10/2023    CREATININE 0.63 08/10/2023    EGFR 111.6 08/10/2023    BCR 7.9 08/10/2023    K 4.0 08/10/2023    CO2 24.0 08/10/2023    CALCIUM 8.4 (L) 08/10/2023    ALBUMIN 3.4 (L) 08/10/2023    BILITOT 0.2 08/10/2023    AST 17 08/10/2023    ALT 20 08/10/2023      Lab Results   Component Value Date    TROPONINT 8 08/10/2023      Stress Test With Pet Myocardial Perfusion (Multi Study) (01/29/2020 09:50)  ECG 12 Lead ED Triage Standing Order; Chest Pain (07/27/2023 13:48)  ECG 12 Lead ED Triage Standing Order; Chest Pain (07/27/2023 11:57)  Stress Test With Myocardial Perfusion One Day (04/12/2023 11:22)    CT Angiogram Chest (07/27/2023 19:31)  XR Chest 1 View (07/27/2023 12:19)  Assessment & Plan   Assessment and Plan {CC Problem List  Visit Diagnosis  ROS  Review (Popup)  Health Maintenance  Quality  BestPractice  Medications  SmartSets  SnapShot Encounters  Media :23}     1. Other chest pain  -Patient with recent admission to hospital on 8/9/2023 with reported ST changes on EKG  -Patient had stress test in April 2023 which was normal myocardial perfusion scan  -Cardiac catheterization 8/10/2023:   FINAL IMPRESSION:  Angiographically normal coronary arteries.  Normal left ventricular systolic function, estimated EF 65%.      2. Systolic congestive heart failure, unspecified HF chronicity  -Patient newly diagnosed with HFrEF in April 2023 via SPECT stress test, ejection fraction has recently improved.  -Patient appears euvolemic during today's visit  - Will continue Jardiance  at 10 mg daily, patient states this medication is affordable and covered by  insurance  -Patient to continue ambulatory blood pressure monitoring at home.  She will hold lisinopril if systolic blood pressures less than 100.  -Continue lisinopril 2.5 mg daily at this time  -Patient has scheduled follow-up with cardiology in October.  Encouraged patient to keep this appointment.      Follow Up {Instructions Charge Capture  Follow-up Communications :23}     Return if symptoms worsen or fail to improve.      Patient was given instructions and counseling regarding her condition or for health maintenance advice. Please see specific information pulled into the AVS if appropriate.  Patient was instructed to call the [facility] with any questions, concerns, or worsening symptoms.    Dictated Utilizing Dragon Dictation   Please note that portions of this note were completed with a voice recognition program.   Part of this note may be an electronic transcription/translation of spoken language to printed text using the Dragon Dictation System.

## 2023-10-20 ENCOUNTER — TELEPHONE (OUTPATIENT)
Dept: CARDIOLOGY | Facility: CLINIC | Age: 43
End: 2023-10-20
Payer: COMMERCIAL

## 2023-10-20 NOTE — TELEPHONE ENCOUNTER
Caller: Delmi Glasgow    Relationship to patient: Self    Best call back number: [phone]    Chief complaint: PT MISSED HFU APPT DUE TO BEING IN HOSPITAL AT  FOR UNRELATED CARDIAC ISSUES    Type of visit: 6 WEEK HOSPITAL FU    Requested date: ASAP     If rescheduling, when is the original appointment: 10.13.23     Additional notes: PLEASE CALL PT BACK WITH APPT. NO AVAILABILITY WITH DR. IBARRA. PLEASE ADVISE, THANK YOU

## 2023-10-27 ENCOUNTER — OFFICE VISIT (OUTPATIENT)
Dept: CARDIOLOGY | Facility: CLINIC | Age: 43
End: 2023-10-27
Payer: COMMERCIAL

## 2023-10-27 VITALS
SYSTOLIC BLOOD PRESSURE: 130 MMHG | OXYGEN SATURATION: 99 % | WEIGHT: 269.4 LBS | BODY MASS INDEX: 47.73 KG/M2 | HEART RATE: 94 BPM | HEIGHT: 63 IN | DIASTOLIC BLOOD PRESSURE: 76 MMHG

## 2023-10-27 DIAGNOSIS — I10 ESSENTIAL HYPERTENSION: ICD-10-CM

## 2023-10-27 DIAGNOSIS — E78.5 HYPERLIPIDEMIA, UNSPECIFIED HYPERLIPIDEMIA TYPE: ICD-10-CM

## 2023-10-27 DIAGNOSIS — R07.9 CHEST PAIN, UNSPECIFIED TYPE: Primary | ICD-10-CM

## 2023-10-27 RX ORDER — ROSUVASTATIN CALCIUM 10 MG/1
10 TABLET, COATED ORAL DAILY
Qty: 90 TABLET | Refills: 3 | Status: SHIPPED | OUTPATIENT
Start: 2023-10-27

## 2023-10-27 RX ORDER — FENOFIBRATE 145 MG/1
145 TABLET, COATED ORAL DAILY
COMMUNITY
Start: 2023-10-20

## 2023-10-27 RX ORDER — ONDANSETRON 4 MG/1
4 TABLET, ORALLY DISINTEGRATING ORAL AS NEEDED
COMMUNITY
Start: 2023-10-11

## 2023-10-27 RX ORDER — SULFAMETHOXAZOLE AND TRIMETHOPRIM 800; 160 MG/1; MG/1
1 TABLET ORAL 2 TIMES DAILY
COMMUNITY
Start: 2023-10-14

## 2023-10-27 NOTE — PROGRESS NOTES
[facility] Cardiology    Encounter Date: 10/27/2023    Patient ID: Delmi Glasgow is a 43 y.o. female.  : 1980     PCP: Prosper Thakur DO       Chief Complaint: Chest Pain      PROBLEM LIST:  Chest pain  Stress PET 2020: normal myocardial perfusion. Rest EF 54%, stress EF 59%.  Stress with myocardial perfusion (2023): normal nuclear stress test.  LVEF: 45 - 49%.   Echo, 08/10/2023: EF 52%. Normal.  LHC, 08/10/2023: EF 65%. Angiographically normal coronary arteries.   HFrEF with subsequent improvement of LV function  Hypertension  Hyperlipidemia  History of palpitations  72 hour holter (2020): normal   Hypothyroidism  Sleep apnea  Morbid obesity, BMI 46  Cholelithiasis noted on CT chest    History of Present Illness  Patient presents today for a hospital follow-up s/p [facility] with a history of chest pain and cardiac risk factors. Since last visit, patient has been doing well overall from a cardiovascular standpoint. She reports that she feels better since her heart catheterization. However, she uses a walker due to dizziness that causes her to fall. Patient denies chest pain, shortness of breath, orthopnea, palpitations, edema, and syncope.     Allergies   Allergen Reactions    Wellbutrin [Bupropion] Hives         Current Outpatient Medications:     ARIPiprazole (ABILIFY) 5 MG tablet, Take 1 tablet by mouth Every Night., Disp: , Rfl:     desvenlafaxine (PRISTIQ) 50 MG 24 hr tablet, Take 1 tablet by mouth Every Night., Disp: , Rfl:     empagliflozin (JARDIANCE) 10 MG tablet tablet, Take 1 tablet by mouth Daily., Disp: 30 tablet, Rfl: 1    fenofibrate (TRICOR) 145 MG tablet, Take 1 tablet by mouth Daily., Disp: , Rfl:     levothyroxine (SYNTHROID, LEVOTHROID) 112 MCG tablet, Take 1 tablet by mouth Daily., Disp: , Rfl:     lisinopril (PRINIVIL,ZESTRIL) 2.5 MG tablet, Take 1 tablet by mouth Daily., Disp: , Rfl:     nitroglycerin (NITROSTAT) 0.4 MG SL tablet, Place 1 tablet  "under the tongue Every 5 (Five) Minutes As Needed for Chest Pain. Take no more than 3 doses in 15 minutes., Disp: 100 tablet, Rfl: 0    ondansetron ODT (ZOFRAN-ODT) 4 MG disintegrating tablet, Place 1 tablet on the tongue As Needed for Nausea or Vomiting., Disp: , Rfl:     pantoprazole (Protonix) 40 MG EC tablet, Take 1 tablet by mouth Daily., Disp: 30 tablet, Rfl: 1    sulfamethoxazole-trimethoprim (BACTRIM DS,SEPTRA DS) 800-160 MG per tablet, Take 1 tablet by mouth 2 (Two) Times a Day., Disp: , Rfl:     rosuvastatin (CRESTOR) 10 MG tablet, Take 1 tablet by mouth Daily., Disp: 90 tablet, Rfl: 3    The following portions of the patient's history were reviewed and updated as appropriate: allergies, current medications, past family history, past medical history, past social history, past surgical history and problem list.    ROS  Review of Systems   14 point ROS negative except for that listed in the HPI.         Objective:     /76 (BP Location: Right arm, Patient Position: Sitting, Cuff Size: Adult)   Pulse 94   Ht 160 cm (63\")   Wt 122 kg (269 lb 6.4 oz)   SpO2 99%   BMI 47.72 kg/m²      Physical Exam  Constitutional: Patient appears well-developed and well-nourished.   HENT: HEENT exam unremarkable.   Neck: Neck supple. No JVD present. No carotid bruits.   Cardiovascular: Normal rate, regular rhythm and normal heart sounds. No murmur heard.   2+ symmetric pulses.   Pulmonary/Chest: Breath sounds normal. Does not exhibit tenderness.   Abdominal: Abdomen benign.   Musculoskeletal: Does not exhibit edema.   Neurological: Neurological exam unremarkable.   Vitals reviewed.    Data Review:   Lab Results   Component Value Date    GLUCOSE 81 08/10/2023    BUN 5 (L) 08/10/2023    CREATININE 0.63 08/10/2023    EGFR 111.6 08/10/2023    BCR 7.9 08/10/2023     08/10/2023    K 4.0 08/10/2023    CO2 24.0 08/10/2023    CALCIUM 8.4 (L) 08/10/2023    ALBUMIN 3.4 (L) 08/10/2023    AST 17 08/10/2023    ALT 20 " 08/10/2023     Lab Results   Component Value Date    CHOL 203 (H) 08/10/2023    TRIG 115 08/10/2023    HDL 42 08/10/2023     (H) 08/10/2023      Lab Results   Component Value Date    WBC 8.16 10/12/2023    RBC 4.45 10/12/2023    HGB 13.0 10/12/2023    HCT 41.0 10/12/2023    MCV 92 10/12/2023     10/12/2023     Lab Results   Component Value Date    TSH 0.044 (L) 08/09/2023     Lab Results   Component Value Date    HGBA1C 5.30 08/10/2023     Assessment:      Diagnosis Plan   1. Chest pain with normal cardiac cath No recent episodes. Continue on nitroglycerin 0.4 mg PRN for chest pain.   2. Essential hypertension  Well controlled. Continue on lisinopril 2.5 mg daily for hypertension.    3. Hyperlipidemia Uncontrolled with elevated LDL. Start on rosuvastatin 10 mg daily for hyperlipidemia.  Continue fenofibrate.  Follow-up with PCP for reassessment.     Plan:   Stable cardiac status.  No recurrence of angina, no CHF symptoms.  Start on rosuvastatin 10 mg daily for better management of hyperlipidemia.   Follow up with PCP to check cholesterol and liver function in 2 to 3 months.  Continue all other current medications.   Heart healthy diet and regular exercise recommended.  FU in 12 MO, sooner as needed.  Thank you for allowing us to participate in the care of your patient.       Scribed for Yury Barclay MD by Esau Alamo. 10/27/2023 14:55 EDT    I, Yury Barclay MD, personally performed the services described in this documentation as scribed by the above named individual in my presence, and it is both accurate and complete.  10/27/2023  15:01 EDT      Please note that portions of this note may have been completed with a voice recognition program. Efforts were made to edit the dictations, but occasionally words are mistranscribed.

## 2023-12-11 RX ORDER — PANTOPRAZOLE SODIUM 40 MG/1
40 TABLET, DELAYED RELEASE ORAL DAILY
Qty: 90 TABLET | Refills: 1 | Status: SHIPPED | OUTPATIENT
Start: 2023-12-11

## 2024-03-25 ENCOUNTER — TELEPHONE (OUTPATIENT)
Dept: CARDIOLOGY | Facility: CLINIC | Age: 44
End: 2024-03-25
Payer: COMMERCIAL

## 2024-03-25 NOTE — TELEPHONE ENCOUNTER
Caller: Delmi Glasgow    Relationship to patient: Self    Best call back number: [phone]    Chief complaint: LEFT SIDE OF HEART FUNCTIONING AT 35%, WEAKNESS      Type of visit: FOLLOW UP     Requested date: ASAP      Additional notes:PATIENT CALLED IN REQUESTING A SOONER APPOINTMENT WITH DR. IBARRA. PATIENT STATED SHE WAS IN THE HOSPITAL AT  FROM 03/18//24 AND WAS TOLD SHE NEEDED TO FOLLOW UP WITH DR. IBARRA AS SOON AS POSSIBLE. WHILE PATIENT WAS IN THE HOSPITAL THEY DONE AN ECHO AND PATIENT WAS TOLD HER LEFT SIDE OF HER HEART WAS ONLY FUNCTIONING AT 35%. PATIENT STATED WHILE IN THE HOSPITAL SHE WAS SO WEAK THAT THERE WERE 3 DAYS THAT SHE COULDN'T WALK. PATIENT WOULD LIKE TO GET IN TO SEE DR. IBARRA AS SOON AS POSSIBLE.

## 2024-03-29 ENCOUNTER — OFFICE VISIT (OUTPATIENT)
Dept: CARDIOLOGY | Facility: CLINIC | Age: 44
End: 2024-03-29
Payer: COMMERCIAL

## 2024-03-29 VITALS
HEART RATE: 100 BPM | OXYGEN SATURATION: 98 % | HEIGHT: 63 IN | SYSTOLIC BLOOD PRESSURE: 140 MMHG | WEIGHT: 257.6 LBS | DIASTOLIC BLOOD PRESSURE: 73 MMHG | BODY MASS INDEX: 45.64 KG/M2

## 2024-03-29 DIAGNOSIS — R07.89 OTHER CHEST PAIN: Primary | ICD-10-CM

## 2024-03-29 DIAGNOSIS — I10 ESSENTIAL HYPERTENSION: ICD-10-CM

## 2024-03-29 DIAGNOSIS — E78.5 DYSLIPIDEMIA: ICD-10-CM

## 2024-03-29 PROCEDURE — 99214 OFFICE O/P EST MOD 30 MIN: CPT

## 2024-03-29 NOTE — PROGRESS NOTES
[facility] Cardiology    Encounter Date: 2024    Patient ID: Delmi Glasgow is a 44 y.o. female.  : 1980     PCP: Provider, No Known       Chief Complaint: Chest pain, unspecified type      PROBLEM LIST:  Chest pain  Stress PET 2020: normal myocardial perfusion. Rest EF 54%, stress EF 59%.  Stress with myocardial perfusion (2023): normal nuclear stress test.  LVEF: 45 - 49%.   Echo, 08/10/2023: EF 52%. Normal.  LHC, 08/10/2023: EF 65%. Angiographically normal coronary arteries.   Echo 3/21/2024, EF 46%. Trace TR/UT.  HFrEF with subsequent improvement of LV function  Hypertension  Hyperlipidemia  History of palpitations  72 hour holter (2020): normal   Hypothyroidism  Sleep apnea  Morbid obesity, BMI 46  Cholelithiasis noted on CT chest  History of surgical resection of pituitary macroadenoma    History of Present Illness  Patient presents today for a follow-up with a history of chest pain and cardiac risk factors. Since last visit, patient was seen at  ED for progressive weakness in her left leg.  She states that this has been ongoing for almost a year but she does feel like this is getting worse.  She is having multiple falls because of this.  She has had an MRI and a lumbar puncture.  She states her lumbar puncture was abnormal and they plan to repeat an MRI.  She denies any claudication symptoms.  She denies any chest pain or pressure.  She denies progressive shortness of air.  No palpitations, orthopnea, significant edema, presyncope or syncope.  States that she discontinued her Jardiance because she felt like this was making her falls worse.  She did get a little better with discontinuation and try to resume the medication but began having increased falls so she discontinued again.      Allergies   Allergen Reactions    Wellbutrin [Bupropion] Hives         Current Outpatient Medications:     ARIPiprazole (ABILIFY) 5 MG tablet, Take 1 tablet by mouth Every  "Night., Disp: , Rfl:     desvenlafaxine (PRISTIQ) 50 MG 24 hr tablet, Take 1 tablet by mouth Every Night., Disp: , Rfl:     fenofibrate (TRICOR) 145 MG tablet, Take 1 tablet by mouth Daily., Disp: , Rfl:     levothyroxine (SYNTHROID, LEVOTHROID) 112 MCG tablet, Take 1 tablet by mouth Daily., Disp: , Rfl:     ondansetron ODT (ZOFRAN-ODT) 4 MG disintegrating tablet, Place 1 tablet on the tongue As Needed for Nausea or Vomiting., Disp: , Rfl:     lisinopril (PRINIVIL,ZESTRIL) 2.5 MG tablet, Take 1 tablet by mouth Daily., Disp: , Rfl:     The following portions of the patient's history were reviewed and updated as appropriate: allergies, current medications, past family history, past medical history, past social history, past surgical history and problem list.        Objective:     /73   Pulse 100   Ht 160 cm (63\")   Wt 117 kg (257 lb 9.6 oz)   SpO2 98%   BMI 45.63 kg/m²      Physical Exam  Constitutional: Patient appears well-developed and well-nourished.   HENT: HEENT exam unremarkable.   Neck: Neck supple. No JVD present.  Cardiovascular: Normal rate, regular rhythm and normal heart sounds. No murmur heard.   2+ symmetric pulses.   Pulmonary/Chest: Breath sounds normal. Does not exhibit tenderness.   Abdominal: Abdomen benign.   Musculoskeletal: Does not exhibit edema.   Neurological: Neurological exam unremarkable.   Vitals reviewed.    Data Review:   Lab date: 03/22/2024  CMP: Glu 82, BUN 12, Creat 0.64, eGFR 110.5, Na 141, K 4.4, Cl 104, CO2 27, Ca 9.7  CBC: WBC 6.33, RBC 4.28, HGB 12.4, HCT 38.2, MCV 89, MCH 29,     Outside records from  reviewed.  Echocardiogram from [facility] reviewed    Lab Results   Component Value Date    CHOL 203 (H) 08/10/2023    TRIG 115 08/10/2023    HDL 42 08/10/2023     (H) 08/10/2023      Lab Results   Component Value Date    TSH 0.044 (L) 08/09/2023     Lab Results   Component Value Date    HGBA1C 5.2 03/20/2024        Procedures              Assessment:      " Diagnosis Plan   1. Other chest pain  Left heart catheterization from 8/2023 with normal coronary arteries.  She is not having any anginal equivalent symptoms.  No need for ischemic evaluation at this time.      2. Essential hypertension  Mildly elevated today.  Has not been taking her lisinopril.  Resume lisinopril 2.5 mg daily.  Discussed that this is not only for blood pressure control but for cardio and renal protection.      3. Dyslipidemia  No recent labs available for review.  Not on statin due to no history of coronary disease and ongoing myalgias.        Ongoing weakness in the left leg sounds consistent with MSK/neuro origin.  She is not having any claudication-like symptoms.  She is already following with her PCP and neuro and will have a MRI soon.  FU in 6 MO, sooner as needed.  Thank you for allowing us to participate in the care of your patient.       Luli Smith PA-C      Please note that portions of this note may have been completed with a voice recognition program. Efforts were made to edit the dictations, but occasionally words are mistranscribed.

## 2024-06-11 ENCOUNTER — APPOINTMENT (OUTPATIENT)
Dept: CT IMAGING | Facility: HOSPITAL | Age: 44
End: 2024-06-11
Payer: MEDICARE

## 2024-06-11 ENCOUNTER — HOSPITAL ENCOUNTER (EMERGENCY)
Facility: HOSPITAL | Age: 44
Discharge: HOME | End: 2024-06-11
Attending: EMERGENCY MEDICINE | Admitting: EMERGENCY MEDICINE
Payer: MEDICARE

## 2024-06-11 VITALS
TEMPERATURE: 98.6 F | DIASTOLIC BLOOD PRESSURE: 90 MMHG | HEIGHT: 63 IN | SYSTOLIC BLOOD PRESSURE: 139 MMHG | RESPIRATION RATE: 18 BRPM | OXYGEN SATURATION: 98 % | BODY MASS INDEX: 43.59 KG/M2 | HEART RATE: 71 BPM | WEIGHT: 246 LBS

## 2024-06-11 DIAGNOSIS — Z86.39 HISTORY OF HYPERLIPIDEMIA: ICD-10-CM

## 2024-06-11 DIAGNOSIS — G37.9 DEMYELINATING DISEASE: ICD-10-CM

## 2024-06-11 DIAGNOSIS — N39.0 ACUTE UTI: ICD-10-CM

## 2024-06-11 DIAGNOSIS — Z86.69 HISTORY OF SLEEP APNEA: ICD-10-CM

## 2024-06-11 DIAGNOSIS — R41.82 ALTERED MENTAL STATUS, UNSPECIFIED ALTERED MENTAL STATUS TYPE: Primary | ICD-10-CM

## 2024-06-11 DIAGNOSIS — Z87.898 HISTORY OF NAUSEA AND VOMITING: ICD-10-CM

## 2024-06-11 DIAGNOSIS — Z86.59 HISTORY OF DEPRESSION: ICD-10-CM

## 2024-06-11 DIAGNOSIS — Z86.018 HISTORY OF BENIGN PITUITARY TUMOR: ICD-10-CM

## 2024-06-11 DIAGNOSIS — R26.89 IMBALANCE: ICD-10-CM

## 2024-06-11 LAB
ALBUMIN SERPL-MCNC: 3.8 G/DL (ref 3.5–5.2)
ALBUMIN/GLOB SERPL: 1.4 G/DL
ALP SERPL-CCNC: 131 U/L (ref 39–117)
ALT SERPL W P-5'-P-CCNC: 34 U/L (ref 1–33)
AMPHET+METHAMPHET UR QL: NEGATIVE
AMPHETAMINES UR QL: NEGATIVE
ANION GAP SERPL CALCULATED.3IONS-SCNC: 10 MMOL/L (ref 5–15)
AST SERPL-CCNC: 31 U/L (ref 1–32)
B PARAPERT DNA SPEC QL NAA+PROBE: NOT DETECTED
B PERT DNA SPEC QL NAA+PROBE: NOT DETECTED
BACTERIA UR QL AUTO: ABNORMAL /HPF
BARBITURATES UR QL SCN: NEGATIVE
BASOPHILS # BLD AUTO: 0.05 10*3/MM3 (ref 0–0.2)
BASOPHILS NFR BLD AUTO: 0.8 % (ref 0–1.5)
BENZODIAZ UR QL SCN: NEGATIVE
BILIRUB SERPL-MCNC: 0.2 MG/DL (ref 0–1.2)
BILIRUB UR QL STRIP: NEGATIVE
BUN SERPL-MCNC: 11 MG/DL (ref 6–20)
BUN/CREAT SERPL: 16.7 (ref 7–25)
BUPRENORPHINE SERPL-MCNC: NEGATIVE NG/ML
C PNEUM DNA NPH QL NAA+NON-PROBE: NOT DETECTED
CALCIUM SPEC-SCNC: 9.3 MG/DL (ref 8.6–10.5)
CANNABINOIDS SERPL QL: NEGATIVE
CHLORIDE SERPL-SCNC: 103 MMOL/L (ref 98–107)
CLARITY UR: ABNORMAL
CO2 SERPL-SCNC: 29 MMOL/L (ref 22–29)
COCAINE UR QL: NEGATIVE
COLOR UR: YELLOW
CREAT SERPL-MCNC: 0.66 MG/DL (ref 0.57–1)
CRP SERPL-MCNC: 1.23 MG/DL (ref 0–0.5)
D-LACTATE SERPL-SCNC: 1.4 MMOL/L (ref 0.5–2)
DEPRECATED RDW RBC AUTO: 45.7 FL (ref 37–54)
EGFRCR SERPLBLD CKD-EPI 2021: 109.7 ML/MIN/1.73
EOSINOPHIL # BLD AUTO: 0.25 10*3/MM3 (ref 0–0.4)
EOSINOPHIL NFR BLD AUTO: 3.8 % (ref 0.3–6.2)
ERYTHROCYTE [DISTWIDTH] IN BLOOD BY AUTOMATED COUNT: 13.8 % (ref 12.3–15.4)
ERYTHROCYTE [SEDIMENTATION RATE] IN BLOOD: 31 MM/HR (ref 0–20)
FENTANYL UR-MCNC: NEGATIVE NG/ML
FLUAV SUBTYP SPEC NAA+PROBE: NOT DETECTED
FLUBV RNA ISLT QL NAA+PROBE: NOT DETECTED
GLOBULIN UR ELPH-MCNC: 2.8 GM/DL
GLUCOSE SERPL-MCNC: 96 MG/DL (ref 65–99)
GLUCOSE UR STRIP-MCNC: NEGATIVE MG/DL
HADV DNA SPEC NAA+PROBE: NOT DETECTED
HCOV 229E RNA SPEC QL NAA+PROBE: NOT DETECTED
HCOV HKU1 RNA SPEC QL NAA+PROBE: NOT DETECTED
HCOV NL63 RNA SPEC QL NAA+PROBE: NOT DETECTED
HCOV OC43 RNA SPEC QL NAA+PROBE: NOT DETECTED
HCT VFR BLD AUTO: 40.7 % (ref 34–46.6)
HGB BLD-MCNC: 12.6 G/DL (ref 12–15.9)
HGB UR QL STRIP.AUTO: NEGATIVE
HMPV RNA NPH QL NAA+NON-PROBE: NOT DETECTED
HPIV1 RNA ISLT QL NAA+PROBE: NOT DETECTED
HPIV2 RNA SPEC QL NAA+PROBE: NOT DETECTED
HPIV3 RNA NPH QL NAA+PROBE: NOT DETECTED
HPIV4 P GENE NPH QL NAA+PROBE: NOT DETECTED
HYALINE CASTS UR QL AUTO: ABNORMAL /LPF
IMM GRANULOCYTES # BLD AUTO: 0.02 10*3/MM3 (ref 0–0.05)
IMM GRANULOCYTES NFR BLD AUTO: 0.3 % (ref 0–0.5)
KETONES UR QL STRIP: NEGATIVE
LEUKOCYTE ESTERASE UR QL STRIP.AUTO: ABNORMAL
LYMPHOCYTES # BLD AUTO: 1.87 10*3/MM3 (ref 0.7–3.1)
LYMPHOCYTES NFR BLD AUTO: 28.2 % (ref 19.6–45.3)
M PNEUMO IGG SER IA-ACNC: NOT DETECTED
MCH RBC QN AUTO: 28.2 PG (ref 26.6–33)
MCHC RBC AUTO-ENTMCNC: 31 G/DL (ref 31.5–35.7)
MCV RBC AUTO: 91.1 FL (ref 79–97)
METHADONE UR QL SCN: NEGATIVE
MONOCYTES # BLD AUTO: 0.42 10*3/MM3 (ref 0.1–0.9)
MONOCYTES NFR BLD AUTO: 6.3 % (ref 5–12)
NEUTROPHILS NFR BLD AUTO: 4.03 10*3/MM3 (ref 1.7–7)
NEUTROPHILS NFR BLD AUTO: 60.6 % (ref 42.7–76)
NITRITE UR QL STRIP: NEGATIVE
NRBC BLD AUTO-RTO: 0 /100 WBC (ref 0–0.2)
OPIATES UR QL: NEGATIVE
OXYCODONE UR QL SCN: NEGATIVE
PCP UR QL SCN: NEGATIVE
PH UR STRIP.AUTO: 5.5 [PH] (ref 5–8)
PLATELET # BLD AUTO: 343 10*3/MM3 (ref 140–450)
PMV BLD AUTO: 9.7 FL (ref 6–12)
POTASSIUM SERPL-SCNC: 4 MMOL/L (ref 3.5–5.2)
PROCALCITONIN SERPL-MCNC: 0.03 NG/ML (ref 0–0.25)
PROT SERPL-MCNC: 6.6 G/DL (ref 6–8.5)
PROT UR QL STRIP: NEGATIVE
RBC # BLD AUTO: 4.47 10*6/MM3 (ref 3.77–5.28)
RBC # UR STRIP: ABNORMAL /HPF
REF LAB TEST METHOD: ABNORMAL
RHINOVIRUS RNA SPEC NAA+PROBE: NOT DETECTED
RSV RNA NPH QL NAA+NON-PROBE: NOT DETECTED
SARS-COV-2 RNA NPH QL NAA+NON-PROBE: NOT DETECTED
SODIUM SERPL-SCNC: 142 MMOL/L (ref 136–145)
SP GR UR STRIP: 1.02 (ref 1–1.03)
SQUAMOUS #/AREA URNS HPF: ABNORMAL /HPF
TRICYCLICS UR QL SCN: NEGATIVE
TROPONIN T SERPL HS-MCNC: 8 NG/L
UROBILINOGEN UR QL STRIP: ABNORMAL
WBC # UR STRIP: ABNORMAL /HPF
WBC NRBC COR # BLD AUTO: 6.64 10*3/MM3 (ref 3.4–10.8)

## 2024-06-11 PROCEDURE — 84145 PROCALCITONIN (PCT): CPT | Performed by: PHYSICIAN ASSISTANT

## 2024-06-11 PROCEDURE — 81001 URINALYSIS AUTO W/SCOPE: CPT | Performed by: PHYSICIAN ASSISTANT

## 2024-06-11 PROCEDURE — 0202U NFCT DS 22 TRGT SARS-COV-2: CPT | Performed by: PHYSICIAN ASSISTANT

## 2024-06-11 PROCEDURE — 85652 RBC SED RATE AUTOMATED: CPT | Performed by: PHYSICIAN ASSISTANT

## 2024-06-11 PROCEDURE — 70450 CT HEAD/BRAIN W/O DYE: CPT

## 2024-06-11 PROCEDURE — 80053 COMPREHEN METABOLIC PANEL: CPT | Performed by: PHYSICIAN ASSISTANT

## 2024-06-11 PROCEDURE — 83605 ASSAY OF LACTIC ACID: CPT | Performed by: PHYSICIAN ASSISTANT

## 2024-06-11 PROCEDURE — 93005 ELECTROCARDIOGRAM TRACING: CPT | Performed by: PHYSICIAN ASSISTANT

## 2024-06-11 PROCEDURE — 99284 EMERGENCY DEPT VISIT MOD MDM: CPT

## 2024-06-11 PROCEDURE — 87086 URINE CULTURE/COLONY COUNT: CPT | Performed by: PHYSICIAN ASSISTANT

## 2024-06-11 PROCEDURE — 25010000002 METOCLOPRAMIDE PER 10 MG: Performed by: PHYSICIAN ASSISTANT

## 2024-06-11 PROCEDURE — 86140 C-REACTIVE PROTEIN: CPT | Performed by: PHYSICIAN ASSISTANT

## 2024-06-11 PROCEDURE — 25810000003 SODIUM CHLORIDE 0.9 % SOLUTION: Performed by: PHYSICIAN ASSISTANT

## 2024-06-11 PROCEDURE — 85025 COMPLETE CBC W/AUTO DIFF WBC: CPT | Performed by: PHYSICIAN ASSISTANT

## 2024-06-11 PROCEDURE — 96374 THER/PROPH/DIAG INJ IV PUSH: CPT

## 2024-06-11 PROCEDURE — 25010000002 KETOROLAC TROMETHAMINE PER 15 MG: Performed by: PHYSICIAN ASSISTANT

## 2024-06-11 PROCEDURE — 84484 ASSAY OF TROPONIN QUANT: CPT | Performed by: PHYSICIAN ASSISTANT

## 2024-06-11 PROCEDURE — 96375 TX/PRO/DX INJ NEW DRUG ADDON: CPT

## 2024-06-11 PROCEDURE — 25010000002 DIPHENHYDRAMINE PER 50 MG: Performed by: PHYSICIAN ASSISTANT

## 2024-06-11 PROCEDURE — 80307 DRUG TEST PRSMV CHEM ANLYZR: CPT | Performed by: PHYSICIAN ASSISTANT

## 2024-06-11 RX ORDER — DIPHENHYDRAMINE HYDROCHLORIDE 50 MG/ML
25 INJECTION INTRAMUSCULAR; INTRAVENOUS ONCE
Status: COMPLETED | OUTPATIENT
Start: 2024-06-11 | End: 2024-06-11

## 2024-06-11 RX ORDER — CEFUROXIME AXETIL 250 MG/1
500 TABLET ORAL ONCE
Status: COMPLETED | OUTPATIENT
Start: 2024-06-11 | End: 2024-06-11

## 2024-06-11 RX ORDER — KETOROLAC TROMETHAMINE 30 MG/ML
30 INJECTION, SOLUTION INTRAMUSCULAR; INTRAVENOUS ONCE
Status: COMPLETED | OUTPATIENT
Start: 2024-06-11 | End: 2024-06-11

## 2024-06-11 RX ORDER — METOCLOPRAMIDE HYDROCHLORIDE 5 MG/ML
10 INJECTION INTRAMUSCULAR; INTRAVENOUS ONCE
Status: COMPLETED | OUTPATIENT
Start: 2024-06-11 | End: 2024-06-11

## 2024-06-11 RX ORDER — SODIUM CHLORIDE 0.9 % (FLUSH) 0.9 %
10 SYRINGE (ML) INJECTION AS NEEDED
Status: DISCONTINUED | OUTPATIENT
Start: 2024-06-11 | End: 2024-06-12 | Stop reason: HOSPADM

## 2024-06-11 RX ORDER — CEFUROXIME AXETIL 500 MG/1
500 TABLET ORAL 2 TIMES DAILY
Qty: 14 TABLET | Refills: 0 | Status: SHIPPED | OUTPATIENT
Start: 2024-06-11

## 2024-06-11 RX ADMIN — SODIUM CHLORIDE 1000 ML: 9 INJECTION, SOLUTION INTRAVENOUS at 20:24

## 2024-06-11 RX ADMIN — DIPHENHYDRAMINE HYDROCHLORIDE 25 MG: 50 INJECTION INTRAMUSCULAR; INTRAVENOUS at 20:22

## 2024-06-11 RX ADMIN — METOCLOPRAMIDE 10 MG: 5 INJECTION, SOLUTION INTRAMUSCULAR; INTRAVENOUS at 20:22

## 2024-06-11 RX ADMIN — KETOROLAC TROMETHAMINE 30 MG: 30 INJECTION, SOLUTION INTRAMUSCULAR; INTRAVENOUS at 20:22

## 2024-06-11 RX ADMIN — CEFUROXIME AXETIL 500 MG: 250 TABLET ORAL at 21:02

## 2024-06-11 NOTE — ED PROVIDER NOTES
"Subjective   History of Present Illness  This is a 44-year-old female that presents the ER with her  for frontal headache as well as some acute on chronic confusion.  Patient's  gives most of the history.  He says that patient has baseline confusion, baseline left-sided weakness, and chronic history of imbalance.  He says that patient has history of benign pituitary tumor with subsequent removal at [facility].  He says that patient had some radiation therapy after the tumor removal and she has had chronic imbalance, left-sided weakness, and memory issues ever since.  Patient follows with neurosurgery, Hung Villa, at , as well as , Neurology, at .  After reviewing recent office notes from both of these physicians, patient was seen and evaluated by Dr. Jeffries on 5/16/24.  He initially saw patient in consultation for multiple sclerosis. She has been having episodes of confusion, as well as stretches of time that she does not remember.  Sometimes it will take her a couple of days to come back to her baseline.  She has had multiple ER visits with the similar advance.  She denies any bad taste or bad smell or prodrome to these events, although sometimes she states that she can \"feel them coming on\".  Patient denies any type of seizure activity or tonic-clonic movements.  When reviewing neurology's recommendations, neurologist did not feel that patient had enough evidence to call her symptoms multiple sclerosis at this point.  He recommended repeat imaging as well as EEG to rule out any type of seizure activity.  Patient underwent recent MRI of the brain with and without contrast and MRI of the orbits with and without contrast.   impression showed no intracranial abnormalities to support demyelination and no acute abnormality involving the orbital spaces to support optic neuritis.  Cervical cord demonstrated normal signal without evidence of demyelination.  Stable equivocal " "multifocal T2 thoracic cord signal abnormalities extending between T2-T3 and T4-T5 through T9-T10 similar to prior.  No new thoracic signal abnormalities and no abnormal enhancement.  Patient was then seen per Neurosurgery, Dr. Villa, on 6/7/24,m for routine visit.  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  MRI was stable.  Neurosurgery recommended recheck in 2 years.  Patient also continues to follow with endocrinology.  Patient tells me today that she had a recent viral upper respiratory infection with cough.  She was treated with oral antibiotics.  She was feeling better.  Then, approximately 2 days ago, patient had an episode of vomiting x 2 with frontal headache.   says that when patient does have intermittent vomiting, she will have increased confusion for a couple of days after.  Patient denies any worsening left-sided weakness or slurred speech.  She denies any chest pain or shortness of breath.  She denies any dysuria, urgency, or frequency.  According to , patient has been drinking fluids well today and ate a small pizza.  He wanted her evaluated due to some acute on chronic confusion.  Patient denies any neck pain or stiffness.  She denies any fever or chills.    History provided by:  Patient and spouse  Headache  Pain location:  Frontal  Quality: \"pain\"  Duration:  3 days  Timing:  Constant  Progression:  Unchanged  Chronicity:  New  Similar to prior headaches: no    Context comment:  2 days ago, pt had episode of n/v x 2. Persistent frontal HA. H/o pituitary tumor removal in the past. Confusion intermittently ever since tumor removal. Chronic left sided weakness s/p radiation from tumor removal.  Relieved by:  Nothing  Worsened by:  Sound  Ineffective treatments:  Acetaminophen  Associated symptoms: dizziness, fatigue, loss of balance (chronic loss of balance.), nausea, vomiting (emesis x 2, 6/9/24.) and weakness    Associated " symptoms: no abdominal pain, no back pain, no blurred vision, no congestion, no cough, no diarrhea, no fever, no focal weakness, no hearing loss, no near-syncope, no neck pain, no neck stiffness, no numbness, no paresthesias, no photophobia, no seizures, no sinus pressure, no sore throat, no swollen glands, no syncope, no tingling, no URI and no visual change    Associated symptoms comment:  Left sided weakness; chronic, after radiation for pituitary tumor removal. Positive phonophobia.      Risk factors comment:  Pt has history of brain surgery; benign pituitary tumor removed in 2021. Recent cold sxs/URI. Finished Amoxil recently.      Review of Systems   Constitutional:  Positive for appetite change and fatigue. Negative for chills, diaphoresis and fever.   HENT:  Negative for congestion, hearing loss, sinus pressure and sore throat.         Positive phonophobia. Recent URI. Pt just finished Amoxil.   Eyes: Negative.  Negative for blurred vision, photophobia and visual disturbance.   Respiratory: Negative.  Negative for cough.    Cardiovascular: Negative.  Negative for chest pain, palpitations, leg swelling, syncope and near-syncope.   Gastrointestinal:  Positive for nausea and vomiting (emesis x 2, 6/9/24.). Negative for abdominal pain and diarrhea.   Genitourinary: Negative.  Negative for dysuria, flank pain, frequency and urgency.        LMP: Essure.   Musculoskeletal: Negative.  Negative for back pain, neck pain and neck stiffness.   Neurological:  Positive for dizziness, weakness, headaches and loss of balance (chronic loss of balance.). Negative for focal weakness, seizures, syncope, facial asymmetry, speech difficulty, numbness and paresthesias.        History of benign pituitary tumor removal in the past with subsequent radiation in 2022. Chronic intermittent imbalance and left sided weakness s/p radiation. Pt follows with Dr. Jeffries, neurology, at . Diagnosed with Demyelinating Disease, but pt and   say that MS was ruled out after lumbar puncture.   Psychiatric/Behavioral:  Positive for confusion.    All other systems reviewed and are negative.      Past Medical History:   Diagnosis Date    CHF (congestive heart failure)     Depression     Elevated cholesterol     Hyperlipemia     Hypothyroid     Sleep apnea        Allergies   Allergen Reactions    Wellbutrin [Bupropion] Hives       Past Surgical History:   Procedure Laterality Date    BRAIN SURGERY      CARDIAC CATHETERIZATION N/A 8/10/2023    Procedure: Left Heart Cath;  Surgeon: Yury Barclay MD;  Location: [facility] CATH INVASIVE LOC[facility];  Service: Cardiology;  Laterality: N/A;    DILATION AND CURETTAGE, DIAGNOSTIC / THERAPEUTIC         Family History   Adopted: Yes   Problem Relation Age of Onset    No Known Problems Mother     No Known Problems Father     No Known Problems Maternal Grandmother     No Known Problems Maternal Grandfather     No Known Problems Paternal Grandmother     No Known Problems Paternal Grandfather        Social History     Socioeconomic History    Marital status:    Tobacco Use    Smoking status: Never     Passive exposure: Past    Smokeless tobacco: Never   Vaping Use    Vaping status: Never Used   Substance and Sexual Activity    Alcohol use: No    Drug use: No    Sexual activity: Yes     Partners: Male     Birth control/protection: Surgical           Objective   Physical Exam  Vitals and nursing note reviewed.   Constitutional:       General: She is not in acute distress.     Appearance: Normal appearance. She is obese. She is not ill-appearing, toxic-appearing or diaphoretic.      Comments: No acute sign of pain or distress.  Nontoxic.  Obesity with BMI of 43   HENT:      Head: Normocephalic and atraumatic.      Nose: Nose normal. No congestion or rhinorrhea.      Right Sinus: No maxillary sinus tenderness or frontal sinus tenderness.      Left Sinus: No maxillary sinus tenderness or frontal sinus tenderness.       Comments: No nasal congestion or rhinorrhea.  No frontal maxillary sinus tenderness     Mouth/Throat:      Mouth: Mucous membranes are moist.      Pharynx: Oropharynx is clear.   Eyes:      Extraocular Movements: Extraocular movements intact.      Right eye: Normal extraocular motion and no nystagmus.      Left eye: Normal extraocular motion and no nystagmus.      Conjunctiva/sclera: Conjunctivae normal.      Pupils: Pupils are equal, round, and reactive to light.      Comments: Pupils equal round reactive to light.  Extraocular movements intact.  No nystagmus   Neck:      Meningeal: Brudzinski's sign and Kernig's sign absent.      Comments: No C-spine tenderness.  Full range of motion.  No meningeal signs  Cardiovascular:      Rate and Rhythm: Normal rate and regular rhythm. No extrasystoles are present.     Pulses: Normal pulses.      Heart sounds: Normal heart sounds.      Comments: Regular rate and rhythm.  No ectopy.  No pedal edema to lower extremities  Pulmonary:      Effort: Pulmonary effort is normal.      Breath sounds: Normal breath sounds.      Comments: Lungs are clear to auscultation bilaterally  Abdominal:      General: Bowel sounds are normal. There is no distension.      Palpations: Abdomen is soft.      Tenderness: There is no abdominal tenderness. There is no right CVA tenderness, left CVA tenderness, guarding or rebound.      Comments: Abdomen soft without distention.  Active bowel sounds in all 4 quadrants.  Nontender to palpation   Musculoskeletal:         General: Normal range of motion.      Cervical back: Normal range of motion and neck supple. No pain with movement, spinous process tenderness or muscular tenderness.      Right lower leg: No edema.      Left lower leg: No edema.   Skin:     General: Skin is warm and dry.   Neurological:      General: No focal deficit present.      Mental Status: She is alert. She is disoriented.      Cranial Nerves: Cranial nerves 2-12 are intact.       Sensory: Sensation is intact.      Motor: Weakness present.      Coordination: Coordination is intact.      Comments: Baseline confusion.  Patient is uncertain of the year.  She knows herself and knows her .  She follows all commands appropriately.  Smile is equal and tongue is midline.  Patient has chronic left-sided weakness.  No new focal deficits.   Psychiatric:         Mood and Affect: Mood and affect normal.         Speech: Speech normal.         Behavior: Behavior normal. Behavior is cooperative.         Thought Content: Thought content normal.         Cognition and Memory: Cognition normal.         Judgment: Judgment normal.      Comments: Normal mood and affect         Procedures           ED Course  ED Course as of 06/11/24 2247 Tue Jun 11, 2024 2054 CT Head Without Contrast  Personally reviewed CT scan of the head.  On my interpretation there is no hemorrhage or mass effect visualized [RS]   2054 CBC & Differential(!)  CMP are overall unremarkable. [RS]   2240 I personally interpreted EKG which showed sinus rhythm.  No acute ST-T wave changes consistent with ischemia.  CT of the brain without contrast showed no acute intracranial abnormality.  CBC and chemistries were essentially normal.  Sed rate was 31 and CRP was 1.23.  Lactic acid was normal and procalcitonin was normal.  Respiratory PCR panel was completely negative.  High-sensitivity troponin was 8.  UDS was completely negative.  Urinalysis revealed 21-50 white blood cells and moderate 2+ leukocytes.  No previous abnormal urine cultures in epic.  I ordered urine culture and we will prescribe Ceftin 500 mg by mouth twice daily x 7 days.  Question whether patient's worsening confusion is due to urinary tract infection.  After reviewing medical records extensively, patient has had chronic confusion, chronic imbalance, chronic left-sided weakness after benign pituitary tumor removal with subsequent radiation.  She has also been diagnosed with  a demyelinating disease, but neurologist at  does not suspect MS.  Patient is also awaiting EEG to rule out any seizure activity.  Recommend patient to call her neurologist, Dr. Jeffries, in the morning for first available recheck.  Complete course of Ceftin as directed.  Encourage fluids and rest and continue with all other current medical management.  Return to the ER if worsening symptoms. [FC]      ED Course User Index  [FC] Kinjal Nunes PA-C  [RS] Sourav Verduzco MD                                 Recent Results (from the past 24 hour(s))   Urinalysis With Microscopic If Indicated (No Culture) - Urine, Clean Catch    Collection Time: 06/11/24  7:22 PM    Specimen: Urine, Clean Catch   Result Value Ref Range    Color, UA Yellow Yellow, Straw    Appearance, UA Cloudy (A) Clear    pH, UA 5.5 5.0 - 8.0    Specific Gravity, UA 1.018 1.001 - 1.030    Glucose, UA Negative Negative    Ketones, UA Negative Negative    Bilirubin, UA Negative Negative    Blood, UA Negative Negative    Protein, UA Negative Negative    Leuk Esterase, UA Moderate (2+) (A) Negative    Nitrite, UA Negative Negative    Urobilinogen, UA 0.2 E.U./dL 0.2 - 1.0 E.U./dL   Urine Drug Screen - Urine, Clean Catch    Collection Time: 06/11/24  7:22 PM    Specimen: Urine, Clean Catch   Result Value Ref Range    THC, Screen, Urine Negative Negative    Phencyclidine (PCP), Urine Negative Negative    Cocaine Screen, Urine Negative Negative    Methamphetamine, Ur Negative Negative    Opiate Screen Negative Negative    Amphetamine Screen, Urine Negative Negative    Benzodiazepine Screen, Urine Negative Negative    Tricyclic Antidepressants Screen Negative Negative    Methadone Screen, Urine Negative Negative    Barbiturates Screen, Urine Negative Negative    Oxycodone Screen, Urine Negative Negative    Buprenorphine, Screen, Urine Negative Negative   Fentanyl, Urine - Urine, Clean Catch    Collection Time: 06/11/24  7:22 PM    Specimen: Urine,  Clean Catch   Result Value Ref Range    Fentanyl, Urine Negative Negative   Urinalysis, Microscopic Only - Urine, Clean Catch    Collection Time: 06/11/24  7:22 PM    Specimen: Urine, Clean Catch   Result Value Ref Range    RBC, UA 0-2 None Seen, 0-2 /HPF    WBC, UA 21-50 (A) None Seen, 0-2 /HPF    Bacteria, UA None Seen None Seen, Trace /HPF    Squamous Epithelial Cells, UA 3-6 (A) None Seen, 0-2 /HPF    Hyaline Casts, UA 0-6 0 - 6 /LPF    Methodology Automated Microscopy    Comprehensive Metabolic Panel    Collection Time: 06/11/24  7:31 PM    Specimen: Blood   Result Value Ref Range    Glucose 96 65 - 99 mg/dL    BUN 11 6 - 20 mg/dL    Creatinine 0.66 0.57 - 1.00 mg/dL    Sodium 142 136 - 145 mmol/L    Potassium 4.0 3.5 - 5.2 mmol/L    Chloride 103 98 - 107 mmol/L    CO2 29.0 22.0 - 29.0 mmol/L    Calcium 9.3 8.6 - 10.5 mg/dL    Total Protein 6.6 6.0 - 8.5 g/dL    Albumin 3.8 3.5 - 5.2 g/dL    ALT (SGPT) 34 (H) 1 - 33 U/L    AST (SGOT) 31 1 - 32 U/L    Alkaline Phosphatase 131 (H) 39 - 117 U/L    Total Bilirubin 0.2 0.0 - 1.2 mg/dL    Globulin 2.8 gm/dL    A/G Ratio 1.4 g/dL    BUN/Creatinine Ratio 16.7 7.0 - 25.0    Anion Gap 10.0 5.0 - 15.0 mmol/L    eGFR 109.7 >60.0 mL/min/1.73   Lactic Acid, Plasma    Collection Time: 06/11/24  7:31 PM    Specimen: Blood   Result Value Ref Range    Lactate 1.4 0.5 - 2.0 mmol/L   Procalcitonin    Collection Time: 06/11/24  7:31 PM    Specimen: Blood   Result Value Ref Range    Procalcitonin 0.03 0.00 - 0.25 ng/mL   C-reactive Protein    Collection Time: 06/11/24  7:31 PM    Specimen: Blood   Result Value Ref Range    C-Reactive Protein 1.23 (H) 0.00 - 0.50 mg/dL   Sedimentation Rate    Collection Time: 06/11/24  7:31 PM    Specimen: Blood   Result Value Ref Range    Sed Rate 31 (H) 0 - 20 mm/hr   Single High Sensitivity Troponin T    Collection Time: 06/11/24  7:31 PM    Specimen: Blood   Result Value Ref Range    HS Troponin T 8 <14 ng/L   Respiratory Panel PCR  w/COVID-19(SARS-CoV-2) BITA/CRYS/JOYA/PAD/COR/NIA In-House, NP Swab in UTM/VTM, 2 HR TAT - Swab, Nasopharynx    Collection Time: 06/11/24  7:31 PM    Specimen: Nasopharynx; Swab   Result Value Ref Range    ADENOVIRUS, PCR Not Detected Not Detected    Coronavirus 229E Not Detected Not Detected    Coronavirus HKU1 Not Detected Not Detected    Coronavirus NL63 Not Detected Not Detected    Coronavirus OC43 Not Detected Not Detected    COVID19 Not Detected Not Detected - Ref. Range    Human Metapneumovirus Not Detected Not Detected    Human Rhinovirus/Enterovirus Not Detected Not Detected    Influenza A PCR Not Detected Not Detected    Influenza B PCR Not Detected Not Detected    Parainfluenza Virus 1 Not Detected Not Detected    Parainfluenza Virus 2 Not Detected Not Detected    Parainfluenza Virus 3 Not Detected Not Detected    Parainfluenza Virus 4 Not Detected Not Detected    RSV, PCR Not Detected Not Detected    Bordetella pertussis pcr Not Detected Not Detected    Bordetella parapertussis PCR Not Detected Not Detected    Chlamydophila pneumoniae PCR Not Detected Not Detected    Mycoplasma pneumo by PCR Not Detected Not Detected   CBC Auto Differential    Collection Time: 06/11/24  7:31 PM    Specimen: Blood   Result Value Ref Range    WBC 6.64 3.40 - 10.80 10*3/mm3    RBC 4.47 3.77 - 5.28 10*6/mm3    Hemoglobin 12.6 12.0 - 15.9 g/dL    Hematocrit 40.7 34.0 - 46.6 %    MCV 91.1 79.0 - 97.0 fL    MCH 28.2 26.6 - 33.0 pg    MCHC 31.0 (L) 31.5 - 35.7 g/dL    RDW 13.8 12.3 - 15.4 %    RDW-SD 45.7 37.0 - 54.0 fl    MPV 9.7 6.0 - 12.0 fL    Platelets 343 140 - 450 10*3/mm3    Neutrophil % 60.6 42.7 - 76.0 %    Lymphocyte % 28.2 19.6 - 45.3 %    Monocyte % 6.3 5.0 - 12.0 %    Eosinophil % 3.8 0.3 - 6.2 %    Basophil % 0.8 0.0 - 1.5 %    Immature Grans % 0.3 0.0 - 0.5 %    Neutrophils, Absolute 4.03 1.70 - 7.00 10*3/mm3    Lymphocytes, Absolute 1.87 0.70 - 3.10 10*3/mm3    Monocytes, Absolute 0.42 0.10 - 0.90 10*3/mm3     Eosinophils, Absolute 0.25 0.00 - 0.40 10*3/mm3    Basophils, Absolute 0.05 0.00 - 0.20 10*3/mm3    Immature Grans, Absolute 0.02 0.00 - 0.05 10*3/mm3    nRBC 0.0 0.0 - 0.2 /100 WBC   ECG 12 Lead Other; confusion    Collection Time: 06/11/24  7:36 PM   Result Value Ref Range    QT Interval 368 ms    QTC Interval 410 ms     Note: In addition to lab results from this visit, the labs listed above may include labs taken at another facility or during a different encounter within the last 24 hours. Please correlate lab times with ED admission and discharge times for further clarification of the services performed during this visit.    CT Head Without Contrast   Final Result   Impression: No acute intracranial pathology.               Electronically Signed: Abner Quintero MD     6/11/2024 7:52 PM EDT     Workstation ID: [id number]        Vitals:    06/11/24 2021 06/11/24 2130 06/11/24 2145 06/11/24 2200   BP:  113/70  122/80   BP Location:       Patient Position:       Pulse: 71 66 66 61   Resp:       Temp:       TempSrc:       SpO2:  99% 93% 98%   Weight:       Height:         Medications   sodium chloride 0.9 % flush 10 mL (has no administration in time range)   sodium chloride 0.9 % bolus 1,000 mL (1,000 mL Intravenous New Bag 6/11/24 2024)   metoclopramide (REGLAN) injection 10 mg (10 mg Intravenous Given 6/11/24 2022)   diphenhydrAMINE (BENADRYL) injection 25 mg (25 mg Intravenous Given 6/11/24 2022)   ketorolac (TORADOL) injection 30 mg (30 mg Intravenous Given 6/11/24 2022)   cefuroxime (CEFTIN) tablet 500 mg (500 mg Oral Given 6/11/24 2102)     ECG/EMG Results (last 24 hours)       ** No results found for the last 24 hours. **          ECG 12 Lead Other; confusion   Preliminary Result   Test Reason : Other~   Blood Pressure :   */*   mmHG   Vent. Rate :  75 BPM     Atrial Rate :  75 BPM      P-R Int : 148 ms          QRS Dur :  84 ms       QT Int : 368 ms       P-R-T Axes :  26  -2  -2 degrees      QTc Int : 410 ms       Normal sinus rhythm   Voltage criteria for left ventricular hypertrophy   ST & T wave abnormality, consider anterior ischemia   Abnormal ECG   When compared with ECG of 09-AUG-2023 23:36,   T wave inversion less evident in Inferior leads   QT has shortened      Referred By:            Confirmed By:                         Medical Decision Making  Amount and/or Complexity of Data Reviewed  Labs: ordered. Decision-making details documented in ED Course.  Radiology: ordered. Decision-making details documented in ED Course.  ECG/medicine tests: ordered.    Risk  Prescription drug management.        Final diagnoses:   Altered mental status, unspecified altered mental status type   Acute UTI   History of nausea and vomiting   Demyelinating disease   History of benign pituitary tumor   Imbalance   History of hyperlipidemia   History of sleep apnea   History of depression       ED Disposition  ED Disposition       ED Disposition   Discharge    Condition   Stable    Comment   --               Vignesh Jeffries,   [address]  [facility] [address] [phone]    Call in 1 day  Call tomorrow for first available recheck    [facility] EMERGENCY DEPARTMENT  [address] [address] [phone]    If symptoms worsen         Medication List        New Prescriptions      cefuroxime 500 MG tablet  Commonly known as: CEFTIN  Take 1 tablet by mouth 2 (Two) Times a Day.               Where to Get Your Medications        These medications were sent to [id number] - [place], [place] - [address] [place] [address] [phone] [place] [id number]   [address] [facility] [address]      Phone: [phone]   cefuroxime 500 MG tablet            Kinjal Nunes PA-C  06/11/24 2243

## 2024-06-12 LAB
QT INTERVAL: 368 MS
QTC INTERVAL: 410 MS

## 2024-06-13 LAB — BACTERIA SPEC AEROBE CULT: NORMAL

## 2024-06-18 LAB
QT INTERVAL: 368 MS
QTC INTERVAL: 410 MS

## 2024-07-24 ENCOUNTER — APPOINTMENT (OUTPATIENT)
Dept: GENERAL RADIOLOGY | Facility: HOSPITAL | Age: 44
End: 2024-07-24
Payer: MEDICARE

## 2024-07-24 ENCOUNTER — HOSPITAL ENCOUNTER (EMERGENCY)
Facility: HOSPITAL | Age: 44
Discharge: HOME | End: 2024-07-24
Attending: EMERGENCY MEDICINE
Payer: MEDICARE

## 2024-07-24 ENCOUNTER — APPOINTMENT (OUTPATIENT)
Dept: CT IMAGING | Facility: HOSPITAL | Age: 44
End: 2024-07-24
Payer: MEDICARE

## 2024-07-24 VITALS
BODY MASS INDEX: 44.3 KG/M2 | DIASTOLIC BLOOD PRESSURE: 70 MMHG | TEMPERATURE: 98.1 F | OXYGEN SATURATION: 95 % | RESPIRATION RATE: 18 BRPM | HEIGHT: 63 IN | HEART RATE: 74 BPM | WEIGHT: 250 LBS | SYSTOLIC BLOOD PRESSURE: 131 MMHG

## 2024-07-24 DIAGNOSIS — S06.0X0A CONCUSSION WITHOUT LOSS OF CONSCIOUSNESS, INITIAL ENCOUNTER: ICD-10-CM
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
## 2024-07-24 DIAGNOSIS — M25.562 ACUTE PAIN OF LEFT KNEE: ICD-10-CM

## 2024-07-24 LAB
ALBUMIN SERPL-MCNC: 3.9 G/DL (ref 3.5–5.2)
ALBUMIN/GLOB SERPL: 1.4 G/DL
ALP SERPL-CCNC: 147 U/L (ref 39–117)
ALT SERPL W P-5'-P-CCNC: 31 U/L (ref 1–33)
ANION GAP SERPL CALCULATED.3IONS-SCNC: 10 MMOL/L (ref 5–15)
AST SERPL-CCNC: 27 U/L (ref 1–32)
BACTERIA UR QL AUTO: ABNORMAL /HPF
BASOPHILS # BLD AUTO: 0.06 10*3/MM3 (ref 0–0.2)
BASOPHILS NFR BLD AUTO: 0.8 % (ref 0–1.5)
BILIRUB SERPL-MCNC: 0.2 MG/DL (ref 0–1.2)
BILIRUB UR QL STRIP: NEGATIVE
BUN SERPL-MCNC: 11 MG/DL (ref 6–20)
BUN/CREAT SERPL: 18.3 (ref 7–25)
CALCIUM SPEC-SCNC: 9.3 MG/DL (ref 8.6–10.5)
CHLORIDE SERPL-SCNC: 104 MMOL/L (ref 98–107)
CLARITY UR: CLEAR
CO2 SERPL-SCNC: 26 MMOL/L (ref 22–29)
COLOR UR: YELLOW
CREAT SERPL-MCNC: 0.6 MG/DL (ref 0.57–1)
DEPRECATED RDW RBC AUTO: 43.8 FL (ref 37–54)
EGFRCR SERPLBLD CKD-EPI 2021: 112.3 ML/MIN/1.73
EOSINOPHIL # BLD AUTO: 0.2 10*3/MM3 (ref 0–0.4)
EOSINOPHIL NFR BLD AUTO: 2.6 % (ref 0.3–6.2)
ERYTHROCYTE [DISTWIDTH] IN BLOOD BY AUTOMATED COUNT: 13.5 % (ref 12.3–15.4)
GLOBULIN UR ELPH-MCNC: 2.8 GM/DL
GLUCOSE SERPL-MCNC: 97 MG/DL (ref 65–99)
GLUCOSE UR STRIP-MCNC: NEGATIVE MG/DL
HCT VFR BLD AUTO: 39.5 % (ref 34–46.6)
HGB BLD-MCNC: 12.6 G/DL (ref 12–15.9)
HGB UR QL STRIP.AUTO: NEGATIVE
HYALINE CASTS UR QL AUTO: ABNORMAL /LPF
IMM GRANULOCYTES # BLD AUTO: 0.02 10*3/MM3 (ref 0–0.05)
IMM GRANULOCYTES NFR BLD AUTO: 0.3 % (ref 0–0.5)
KETONES UR QL STRIP: NEGATIVE
LEUKOCYTE ESTERASE UR QL STRIP.AUTO: ABNORMAL
LYMPHOCYTES # BLD AUTO: 2.68 10*3/MM3 (ref 0.7–3.1)
LYMPHOCYTES NFR BLD AUTO: 34.6 % (ref 19.6–45.3)
MCH RBC QN AUTO: 28.3 PG (ref 26.6–33)
MCHC RBC AUTO-ENTMCNC: 31.9 G/DL (ref 31.5–35.7)
MCV RBC AUTO: 88.6 FL (ref 79–97)
MONOCYTES # BLD AUTO: 0.58 10*3/MM3 (ref 0.1–0.9)
MONOCYTES NFR BLD AUTO: 7.5 % (ref 5–12)
NEUTROPHILS NFR BLD AUTO: 4.21 10*3/MM3 (ref 1.7–7)
NEUTROPHILS NFR BLD AUTO: 54.2 % (ref 42.7–76)
NITRITE UR QL STRIP: NEGATIVE
NRBC BLD AUTO-RTO: 0 /100 WBC (ref 0–0.2)
PH UR STRIP.AUTO: 6 [PH] (ref 5–8)
PLATELET # BLD AUTO: 319 10*3/MM3 (ref 140–450)
PMV BLD AUTO: 10.1 FL (ref 6–12)
POTASSIUM SERPL-SCNC: 3.8 MMOL/L (ref 3.5–5.2)
PROT SERPL-MCNC: 6.7 G/DL (ref 6–8.5)
PROT UR QL STRIP: NEGATIVE
RBC # BLD AUTO: 4.46 10*6/MM3 (ref 3.77–5.28)
RBC # UR STRIP: ABNORMAL /HPF
REF LAB TEST METHOD: ABNORMAL
SODIUM SERPL-SCNC: 140 MMOL/L (ref 136–145)
SP GR UR STRIP: 1.01 (ref 1–1.03)
SQUAMOUS #/AREA URNS HPF: ABNORMAL /HPF
T4 FREE SERPL-MCNC: 1.48 NG/DL (ref 0.92–1.68)
TSH SERPL DL<=0.05 MIU/L-ACNC: <0.005 UIU/ML (ref 0.27–4.2)
UROBILINOGEN UR QL STRIP: ABNORMAL
WBC # UR STRIP: ABNORMAL /HPF
WBC NRBC COR # BLD AUTO: 7.75 10*3/MM3 (ref 3.4–10.8)

## 2024-07-24 PROCEDURE — 73560 X-RAY EXAM OF KNEE 1 OR 2: CPT

## 2024-07-24 PROCEDURE — 96375 TX/PRO/DX INJ NEW DRUG ADDON: CPT

## 2024-07-24 PROCEDURE — 80053 COMPREHEN METABOLIC PANEL: CPT | Performed by: EMERGENCY MEDICINE

## 2024-07-24 PROCEDURE — 25010000002 METOCLOPRAMIDE PER 10 MG: Performed by: EMERGENCY MEDICINE

## 2024-07-24 PROCEDURE — 96374 THER/PROPH/DIAG INJ IV PUSH: CPT

## 2024-07-24 PROCEDURE — 25010000002 DIPHENHYDRAMINE PER 50 MG: Performed by: EMERGENCY MEDICINE

## 2024-07-24 PROCEDURE — 81001 URINALYSIS AUTO W/SCOPE: CPT | Performed by: EMERGENCY MEDICINE

## 2024-07-24 PROCEDURE — 25010000002 KETOROLAC TROMETHAMINE PER 15 MG: Performed by: EMERGENCY MEDICINE

## 2024-07-24 PROCEDURE — 70450 CT HEAD/BRAIN W/O DYE: CPT

## 2024-07-24 PROCEDURE — 84443 ASSAY THYROID STIM HORMONE: CPT | Performed by: EMERGENCY MEDICINE

## 2024-07-24 PROCEDURE — 99284 EMERGENCY DEPT VISIT MOD MDM: CPT

## 2024-07-24 PROCEDURE — 85025 COMPLETE CBC W/AUTO DIFF WBC: CPT | Performed by: EMERGENCY MEDICINE

## 2024-07-24 PROCEDURE — 84439 ASSAY OF FREE THYROXINE: CPT | Performed by: EMERGENCY MEDICINE

## 2024-07-24 RX ORDER — KETOROLAC TROMETHAMINE 15 MG/ML
15 INJECTION, SOLUTION INTRAMUSCULAR; INTRAVENOUS ONCE
Status: COMPLETED | OUTPATIENT
Start: 2024-07-24 | End: 2024-07-24

## 2024-07-24 RX ORDER — DIPHENHYDRAMINE HYDROCHLORIDE 50 MG/ML
25 INJECTION INTRAMUSCULAR; INTRAVENOUS ONCE
Status: COMPLETED | OUTPATIENT
Start: 2024-07-24 | End: 2024-07-24

## 2024-07-24 RX ORDER — PANTOPRAZOLE SODIUM 20 MG/1
20 TABLET, DELAYED RELEASE ORAL
COMMUNITY

## 2024-07-24 RX ORDER — METOCLOPRAMIDE HYDROCHLORIDE 5 MG/ML
10 INJECTION INTRAMUSCULAR; INTRAVENOUS ONCE
Status: COMPLETED | OUTPATIENT
Start: 2024-07-24 | End: 2024-07-24

## 2024-07-24 RX ADMIN — METOCLOPRAMIDE 10 MG: 5 INJECTION, SOLUTION INTRAMUSCULAR; INTRAVENOUS at 06:10

## 2024-07-24 RX ADMIN — DIPHENHYDRAMINE HYDROCHLORIDE 25 MG: 50 INJECTION INTRAMUSCULAR; INTRAVENOUS at 06:10

## 2024-07-24 RX ADMIN — KETOROLAC TROMETHAMINE 15 MG: 15 INJECTION INTRAMUSCULAR at 04:57

## 2024-07-24 NOTE — DISCHARGE INSTRUCTIONS
Use Aleve to help with headache.  Get plenty of brain rest and lots of sleep as you are recovering from this concussion.  Use extra caution not to have further head injury while you are recovering from this concussion.  Recommend close follow-up with your primary care doctor, call to schedule an appointment.  Return to the ER as needed for any new or worsening symptoms

## 2024-07-24 NOTE — ED PROVIDER NOTES
Subjective   History of Present Illness  Patient presents for evaluation of a fall and head injury as well as left knee pain.  Patient suffers from some chronic dizzy episodes, had a typical episode for her today when she was getting out of the truck and fell to the ground and struck her head and left knee on the ground.  She has had a headache on the right side of her head since she fell.  Does not believe she lost consciousness.        Review of Systems    Past Medical History:   Diagnosis Date    CHF (congestive heart failure)     Depression     Elevated cholesterol     Hyperlipemia     Hypothyroid     Sleep apnea        Allergies   Allergen Reactions    Wellbutrin [Bupropion] Hives       Past Surgical History:   Procedure Laterality Date    BRAIN SURGERY      CARDIAC CATHETERIZATION N/A 8/10/2023    Procedure: Left Heart Cath;  Surgeon: Yury Barclay MD;  Location: [facility] CATH INVASIVE LOCATION;  Service: Cardiology;  Laterality: N/A;    DILATION AND CURETTAGE, DIAGNOSTIC / THERAPEUTIC         Family History   Adopted: Yes   Problem Relation Age of Onset    No Known Problems Mother     No Known Problems Father     No Known Problems Maternal Grandmother     No Known Problems Maternal Grandfather     No Known Problems Paternal Grandmother     No Known Problems Paternal Grandfather        Social History     Socioeconomic History    Marital status:    Tobacco Use    Smoking status: Never     Passive exposure: Past    Smokeless tobacco: Never   Vaping Use    Vaping status: Never Used   Substance and Sexual Activity    Alcohol use: No    Drug use: No    Sexual activity: Yes     Partners: Male     Birth control/protection: Surgical           Objective   Physical Exam  Constitutional:       General: She is not in acute distress.  HENT:      Head: Normocephalic and atraumatic.   Eyes:      Conjunctiva/sclera: Conjunctivae normal.      Pupils: Pupils are equal, round, and reactive to light.   Neck:      Comments:  No tenderness to the cervical spine.  No step-offs or deformities.  Normal range of motion    Cardiovascular:      Rate and Rhythm: Normal rate and regular rhythm.      Pulses: Normal pulses.      Heart sounds: No murmur heard.     No gallop.   Pulmonary:      Effort: Pulmonary effort is normal. No respiratory distress.      Breath sounds: No wheezing or rales.   Chest:      Chest wall: No tenderness.   Abdominal:      General: Abdomen is flat. There is no distension.      Tenderness: There is no abdominal tenderness.   Musculoskeletal:         General: No swelling or deformity. Normal range of motion.      Comments: No tenderness to the thoracic or lumbar spine.  No step-offs or deformities.   pain with range of motion of the left knee     Skin:     General: Skin is warm and dry.      Capillary Refill: Capillary refill takes less than 2 seconds.   Neurological:      General: No focal deficit present.      Mental Status: She is alert and oriented to person, place, and time.      Comments: GCS 15.  Cranial Nerves II-XII intact without deficit.  Strength 5/5 in the bilateral upper extremities.  Strength 5/5 in the bilateral lower extremities.  Sensation to light touch intact throughout.    Psychiatric:         Mood and Affect: Mood normal.         Behavior: Behavior normal.         Procedures           ED Course  ED Course as of 07/24/24 0713   Wed Jul 24, 2024   0543 X-ray of the left knee independently interpreted by myself demonstrates arthritic changes without acute fracture [KB]   0543 Laboratory workup independently interpreted by myself demonstrates no substantial abnormality [KB]   0711 CT scan of the brain independently interpreted by myself demonstrates no acute intracranial abnormality [KB]      ED Course User Index  [KB] Carl Boss MD                                             Medical Decision Making  Differential diagnosis includes intracranial hemorrhage, concussion, metabolic derangement, injury to  the knee including fracture dislocation or soft tissue injury.  Lab and imaging studies were conducted    Patient reevaluated on multiple occasions.  Normal vital signs.  Discussed lab and imaging tests, diagnosis of concussion, symptom management, return precautions to the ER, follow-up with primary care doctor.  Discharged in good condition    Problems Addressed:  Acute pain of left knee: complicated acute illness or injury  Concussion without loss of consciousness, initial encounter: complicated acute illness or injury  Fall, initial encounter: complicated acute illness or injury    Amount and/or Complexity of Data Reviewed  Independent Historian:      Details: Patient's  at the bedside provides some details of HPI  External Data Reviewed: notes.     Details: 6/11/2024 reviewed most recent ER visit where patient was treated for confusion  Labs: ordered. Decision-making details documented in ED Course.  Radiology: ordered and independent interpretation performed. Decision-making details documented in ED Course.    Risk  OTC drugs.  Prescription drug management.  Decision regarding hospitalization.        Final diagnoses:   Fall, initial encounter   Concussion without loss of consciousness, initial encounter   Acute pain of left knee       ED Disposition  ED Disposition       ED Disposition   Discharge    Condition   Stable    Comment   --             Recent Results (from the past 24 hour(s))   Comprehensive Metabolic Panel    Collection Time: 07/24/24  4:48 AM    Specimen: Blood   Result Value Ref Range    Glucose 97 65 - 99 mg/dL    BUN 11 6 - 20 mg/dL    Creatinine 0.60 0.57 - 1.00 mg/dL    Sodium 140 136 - 145 mmol/L    Potassium 3.8 3.5 - 5.2 mmol/L    Chloride 104 98 - 107 mmol/L    CO2 26.0 22.0 - 29.0 mmol/L    Calcium 9.3 8.6 - 10.5 mg/dL    Total Protein 6.7 6.0 - 8.5 g/dL    Albumin 3.9 3.5 - 5.2 g/dL    ALT (SGPT) 31 1 - 33 U/L    AST (SGOT) 27 1 - 32 U/L    Alkaline Phosphatase 147 (H) 39 - 117  U/L    Total Bilirubin 0.2 0.0 - 1.2 mg/dL    Globulin 2.8 gm/dL    A/G Ratio 1.4 g/dL    BUN/Creatinine Ratio 18.3 7.0 - 25.0    Anion Gap 10.0 5.0 - 15.0 mmol/L    eGFR 112.3 >60.0 mL/min/1.73   TSH    Collection Time: 07/24/24  4:48 AM    Specimen: Blood   Result Value Ref Range    TSH <0.005 (L) 0.270 - 4.200 uIU/mL   T4, Free    Collection Time: 07/24/24  4:48 AM    Specimen: Blood   Result Value Ref Range    Free T4 1.48 0.92 - 1.68 ng/dL   CBC Auto Differential    Collection Time: 07/24/24  4:48 AM    Specimen: Blood   Result Value Ref Range    WBC 7.75 3.40 - 10.80 10*3/mm3    RBC 4.46 3.77 - 5.28 10*6/mm3    Hemoglobin 12.6 12.0 - 15.9 g/dL    Hematocrit 39.5 34.0 - 46.6 %    MCV 88.6 79.0 - 97.0 fL    MCH 28.3 26.6 - 33.0 pg    MCHC 31.9 31.5 - 35.7 g/dL    RDW 13.5 12.3 - 15.4 %    RDW-SD 43.8 37.0 - 54.0 fl    MPV 10.1 6.0 - 12.0 fL    Platelets 319 140 - 450 10*3/mm3    Neutrophil % 54.2 42.7 - 76.0 %    Lymphocyte % 34.6 19.6 - 45.3 %    Monocyte % 7.5 5.0 - 12.0 %    Eosinophil % 2.6 0.3 - 6.2 %    Basophil % 0.8 0.0 - 1.5 %    Immature Grans % 0.3 0.0 - 0.5 %    Neutrophils, Absolute 4.21 1.70 - 7.00 10*3/mm3    Lymphocytes, Absolute 2.68 0.70 - 3.10 10*3/mm3    Monocytes, Absolute 0.58 0.10 - 0.90 10*3/mm3    Eosinophils, Absolute 0.20 0.00 - 0.40 10*3/mm3    Basophils, Absolute 0.06 0.00 - 0.20 10*3/mm3    Immature Grans, Absolute 0.02 0.00 - 0.05 10*3/mm3    nRBC 0.0 0.0 - 0.2 /100 WBC   Urinalysis With Microscopic If Indicated (No Culture) - Urine, Clean Catch    Collection Time: 07/24/24  6:05 AM    Specimen: Urine, Clean Catch   Result Value Ref Range    Color, UA Yellow Yellow, Straw    Appearance, UA Clear Clear    pH, UA 6.0 5.0 - 8.0    Specific Gravity, UA 1.010 1.005 - 1.030    Glucose, UA Negative Negative    Ketones, UA Negative Negative    Bilirubin, UA Negative Negative    Blood, UA Negative Negative    Protein, UA Negative Negative    Leuk Esterase, UA Trace (A) Negative    Nitrite,  "UA Negative Negative    Urobilinogen, UA 0.2 E.U./dL 0.2 - 1.0 E.U./dL   Urinalysis, Microscopic Only - Urine, Clean Catch    Collection Time: 07/24/24  6:05 AM    Specimen: Urine, Clean Catch   Result Value Ref Range    RBC, UA 0-2 None Seen, 0-2 /HPF    WBC, UA 3-5 (A) None Seen, 0-2 /HPF    Bacteria, UA None Seen None Seen, Trace /HPF    Squamous Epithelial Cells, UA 7-12 (A) None Seen, 0-2 /HPF    Hyaline Casts, UA 0-6 0 - 6 /LPF    Methodology Automated Microscopy      Note: In addition to lab results from this visit, the labs listed above may include labs taken at another facility or during a different encounter within the last 24 hours. Please correlate lab times with ED admission and discharge times for further clarification of the services performed during this visit.    CT Head Without Contrast   Final Result   Impression:   No acute intracranial process. Postoperative changes from previous transsphenoidal resection of a pituitary lesion.            Electronically Signed: Venancio Escobar MD     7/24/2024 5:55 AM EDT     Workstation ID: [id number]      XR Knee 1 or 2 View Left   Final Result   Impression:   Moderate osteoarthritis. No acute fracture.            Electronically Signed: Venancio Escobar MD     7/24/2024 5:30 AM EDT     Workstation ID: [id number]        Vitals:    07/24/24 0421 07/24/24 0423 07/24/24 0433 07/24/24 0601   BP: 137/90 130/77  131/70   BP Location:    Right arm   Patient Position:    Lying   Pulse: 77  72 74   Resp: 18      Temp: 98.1 °F (36.7 °C)      SpO2: 98%  97% 95%   Weight: 113 kg (250 lb)      Height: 160 cm (63\")        Medications   ketorolac (TORADOL) injection 15 mg (15 mg Intravenous Given 7/24/24 0457)   metoclopramide (REGLAN) injection 10 mg (10 mg Intravenous Given 7/24/24 0610)   diphenhydrAMINE (BENADRYL) injection 25 mg (25 mg Intravenous Given 7/24/24 0610)     ECG/EMG Results (last 24 hours)       ** No results found for the last 24 hours. **          No orders to " display           No follow-up provider specified.       Medication List      No changes were made to your prescriptions during this visit.            Carl Boss MD  07/24/24 7353

## 2024-07-31 ENCOUNTER — APPOINTMENT (OUTPATIENT)
Dept: MRI IMAGING | Facility: HOSPITAL | Age: 44
End: 2024-07-31
Payer: MEDICARE

## 2024-07-31 ENCOUNTER — HOSPITAL ENCOUNTER (EMERGENCY)
Facility: HOSPITAL | Age: 44
Discharge: HOME | End: 2024-07-31
Attending: EMERGENCY MEDICINE
Payer: MEDICARE

## 2024-07-31 VITALS
OXYGEN SATURATION: 95 % | DIASTOLIC BLOOD PRESSURE: 77 MMHG | BODY MASS INDEX: 44.3 KG/M2 | TEMPERATURE: 98.4 F | HEIGHT: 63 IN | RESPIRATION RATE: 18 BRPM | SYSTOLIC BLOOD PRESSURE: 130 MMHG | WEIGHT: 250 LBS | HEART RATE: 69 BPM

## 2024-07-31 DIAGNOSIS — R41.0 CONFUSION: ICD-10-CM

## 2024-07-31 DIAGNOSIS — G43.801 OTHER MIGRAINE WITH STATUS MIGRAINOSUS, NOT INTRACTABLE: Primary | ICD-10-CM

## 2024-07-31 LAB
ANION GAP SERPL CALCULATED.3IONS-SCNC: 11 MMOL/L (ref 5–15)
B-HCG UR QL: NEGATIVE
BASOPHILS # BLD AUTO: 0.05 10*3/MM3 (ref 0–0.2)
BASOPHILS NFR BLD AUTO: 0.8 % (ref 0–1.5)
BILIRUB UR QL STRIP: NEGATIVE
BUN SERPL-MCNC: 13 MG/DL (ref 6–20)
BUN/CREAT SERPL: 22 (ref 7–25)
CALCIUM SPEC-SCNC: 9.4 MG/DL (ref 8.6–10.5)
CHLORIDE SERPL-SCNC: 105 MMOL/L (ref 98–107)
CLARITY UR: CLEAR
CO2 SERPL-SCNC: 26 MMOL/L (ref 22–29)
COLOR UR: YELLOW
CREAT SERPL-MCNC: 0.59 MG/DL (ref 0.57–1)
DEPRECATED RDW RBC AUTO: 44.6 FL (ref 37–54)
EGFRCR SERPLBLD CKD-EPI 2021: 112.7 ML/MIN/1.73
EOSINOPHIL # BLD AUTO: 0.16 10*3/MM3 (ref 0–0.4)
EOSINOPHIL NFR BLD AUTO: 2.5 % (ref 0.3–6.2)
ERYTHROCYTE [DISTWIDTH] IN BLOOD BY AUTOMATED COUNT: 13.9 % (ref 12.3–15.4)
GLUCOSE SERPL-MCNC: 92 MG/DL (ref 65–99)
GLUCOSE UR STRIP-MCNC: NEGATIVE MG/DL
HCT VFR BLD AUTO: 37.5 % (ref 34–46.6)
HGB BLD-MCNC: 12.2 G/DL (ref 12–15.9)
HGB UR QL STRIP.AUTO: NEGATIVE
IMM GRANULOCYTES # BLD AUTO: 0.01 10*3/MM3 (ref 0–0.05)
IMM GRANULOCYTES NFR BLD AUTO: 0.2 % (ref 0–0.5)
KETONES UR QL STRIP: NEGATIVE
LEUKOCYTE ESTERASE UR QL STRIP.AUTO: NEGATIVE
LYMPHOCYTES # BLD AUTO: 2.67 10*3/MM3 (ref 0.7–3.1)
LYMPHOCYTES NFR BLD AUTO: 42.2 % (ref 19.6–45.3)
MCH RBC QN AUTO: 28.4 PG (ref 26.6–33)
MCHC RBC AUTO-ENTMCNC: 32.5 G/DL (ref 31.5–35.7)
MCV RBC AUTO: 87.2 FL (ref 79–97)
MONOCYTES # BLD AUTO: 0.46 10*3/MM3 (ref 0.1–0.9)
MONOCYTES NFR BLD AUTO: 7.3 % (ref 5–12)
NEUTROPHILS NFR BLD AUTO: 2.97 10*3/MM3 (ref 1.7–7)
NEUTROPHILS NFR BLD AUTO: 47 % (ref 42.7–76)
NITRITE UR QL STRIP: NEGATIVE
NRBC BLD AUTO-RTO: 0 /100 WBC (ref 0–0.2)
PH UR STRIP.AUTO: 6 [PH] (ref 5–8)
PLATELET # BLD AUTO: 311 10*3/MM3 (ref 140–450)
PMV BLD AUTO: 10.3 FL (ref 6–12)
POTASSIUM SERPL-SCNC: 4 MMOL/L (ref 3.5–5.2)
PROT UR QL STRIP: NEGATIVE
RBC # BLD AUTO: 4.3 10*6/MM3 (ref 3.77–5.28)
SODIUM SERPL-SCNC: 142 MMOL/L (ref 136–145)
SP GR UR STRIP: 1.02 (ref 1–1.03)
UROBILINOGEN UR QL STRIP: NORMAL
WBC NRBC COR # BLD AUTO: 6.32 10*3/MM3 (ref 3.4–10.8)

## 2024-07-31 PROCEDURE — 70551 MRI BRAIN STEM W/O DYE: CPT

## 2024-07-31 PROCEDURE — 85025 COMPLETE CBC W/AUTO DIFF WBC: CPT | Performed by: EMERGENCY MEDICINE

## 2024-07-31 PROCEDURE — 25010000002 DIPHENHYDRAMINE PER 50 MG: Performed by: EMERGENCY MEDICINE

## 2024-07-31 PROCEDURE — 25010000002 DEXAMETHASONE PER 1 MG: Performed by: EMERGENCY MEDICINE

## 2024-07-31 PROCEDURE — 25010000002 PROCHLORPERAZINE 10 MG/2ML SOLUTION: Performed by: EMERGENCY MEDICINE

## 2024-07-31 PROCEDURE — 96375 TX/PRO/DX INJ NEW DRUG ADDON: CPT

## 2024-07-31 PROCEDURE — 96374 THER/PROPH/DIAG INJ IV PUSH: CPT

## 2024-07-31 PROCEDURE — 80048 BASIC METABOLIC PNL TOTAL CA: CPT | Performed by: EMERGENCY MEDICINE

## 2024-07-31 PROCEDURE — 25810000003 SODIUM CHLORIDE 0.9 % SOLUTION: Performed by: EMERGENCY MEDICINE

## 2024-07-31 PROCEDURE — 36415 COLL VENOUS BLD VENIPUNCTURE: CPT

## 2024-07-31 PROCEDURE — 96361 HYDRATE IV INFUSION ADD-ON: CPT

## 2024-07-31 PROCEDURE — 81025 URINE PREGNANCY TEST: CPT | Performed by: EMERGENCY MEDICINE

## 2024-07-31 PROCEDURE — 25010000002 KETOROLAC TROMETHAMINE PER 15 MG: Performed by: EMERGENCY MEDICINE

## 2024-07-31 PROCEDURE — 99284 EMERGENCY DEPT VISIT MOD MDM: CPT

## 2024-07-31 PROCEDURE — 81003 URINALYSIS AUTO W/O SCOPE: CPT | Performed by: EMERGENCY MEDICINE

## 2024-07-31 RX ORDER — BUTALBITAL, ACETAMINOPHEN AND CAFFEINE 50; 325; 40 MG/1; MG/1; MG/1
1 TABLET ORAL ONCE
Status: COMPLETED | OUTPATIENT
Start: 2024-07-31 | End: 2024-07-31

## 2024-07-31 RX ORDER — DEXAMETHASONE SODIUM PHOSPHATE 10 MG/ML
6 INJECTION INTRAMUSCULAR; INTRAVENOUS ONCE
Status: COMPLETED | OUTPATIENT
Start: 2024-07-31 | End: 2024-07-31

## 2024-07-31 RX ORDER — DIPHENHYDRAMINE HYDROCHLORIDE 50 MG/ML
25 INJECTION INTRAMUSCULAR; INTRAVENOUS ONCE
Status: COMPLETED | OUTPATIENT
Start: 2024-07-31 | End: 2024-07-31

## 2024-07-31 RX ORDER — SODIUM CHLORIDE 0.9 % (FLUSH) 0.9 %
10 SYRINGE (ML) INJECTION AS NEEDED
Status: DISCONTINUED | OUTPATIENT
Start: 2024-07-31 | End: 2024-07-31 | Stop reason: HOSPADM

## 2024-07-31 RX ORDER — KETOROLAC TROMETHAMINE 15 MG/ML
15 INJECTION, SOLUTION INTRAMUSCULAR; INTRAVENOUS ONCE
Status: COMPLETED | OUTPATIENT
Start: 2024-07-31 | End: 2024-07-31

## 2024-07-31 RX ORDER — BUTALBITAL, ACETAMINOPHEN AND CAFFEINE 50; 325; 40 MG/1; MG/1; MG/1
1 TABLET ORAL EVERY 6 HOURS PRN
Qty: 12 TABLET | Refills: 0 | Status: SHIPPED | OUTPATIENT
Start: 2024-07-31

## 2024-07-31 RX ORDER — PROCHLORPERAZINE EDISYLATE 5 MG/ML
10 INJECTION INTRAMUSCULAR; INTRAVENOUS ONCE
Status: COMPLETED | OUTPATIENT
Start: 2024-07-31 | End: 2024-07-31

## 2024-07-31 RX ADMIN — BUTALBITAL, ACETAMINOPHEN, AND CAFFEINE 1 TABLET: 50; 325; 40 TABLET ORAL at 11:15

## 2024-07-31 RX ADMIN — SODIUM CHLORIDE 1000 ML: 9 INJECTION, SOLUTION INTRAVENOUS at 07:23

## 2024-07-31 RX ADMIN — DIPHENHYDRAMINE HYDROCHLORIDE 25 MG: 50 INJECTION INTRAMUSCULAR; INTRAVENOUS at 07:26

## 2024-07-31 RX ADMIN — PROCHLORPERAZINE EDISYLATE 10 MG: 5 INJECTION INTRAMUSCULAR; INTRAVENOUS at 07:26

## 2024-07-31 RX ADMIN — KETOROLAC TROMETHAMINE 15 MG: 15 INJECTION, SOLUTION INTRAMUSCULAR; INTRAVENOUS at 07:26

## 2024-07-31 RX ADMIN — DEXAMETHASONE SODIUM PHOSPHATE 6 MG: 10 INJECTION INTRAMUSCULAR; INTRAVENOUS at 07:26

## 2024-07-31 NOTE — DISCHARGE INSTRUCTIONS
Return if any concerns.  I have sent in a referral for [facility].  I would recommend however that she continue to work with your current neurologist at [facility].  Feel free to return anytime if any concerns.

## 2024-07-31 NOTE — ED PROVIDER NOTES
Subjective   History of Present Illness  Mrs Glasgow presents with headache and confusion.  She reports right-sided headache.  Anxiety, confusion, dizziness.  Her  tells me she fell a week ago and since that time has had ongoing symptoms.  She has had similar symptoms on a chronic basis.  She had a brain tumor removed in 2022 at [facility].  She had subsequent radiation to her brain.  Chart indicates history of sleep apnea, she tells me she does not have a mask.  She sees [facility] ophthalmology for chronic visual changes.  She sees neurology and ENT at [facility].  Looks like she is undergoing physical therapy for balance.  Currently uses a walker.  She saw her primary care provider yesterday.      Review of Systems    Past Medical History:   Diagnosis Date    CHF (congestive heart failure)     Depression     Elevated cholesterol     Hyperlipemia     Hypothyroid     Sleep apnea        Allergies   Allergen Reactions    Wellbutrin [Bupropion] Hives       Past Surgical History:   Procedure Laterality Date    BRAIN SURGERY      CARDIAC CATHETERIZATION N/A 8/10/2023    Procedure: Left Heart Cath;  Surgeon: Yury Barclay MD;  Location: [facility] INVASIVE LOCATION;  Service: Cardiology;  Laterality: N/A;    DILATION AND CURETTAGE, DIAGNOSTIC / THERAPEUTIC         Family History   Adopted: Yes   Problem Relation Age of Onset    No Known Problems Mother     No Known Problems Father     No Known Problems Maternal Grandmother     No Known Problems Maternal Grandfather     No Known Problems Paternal Grandmother     No Known Problems Paternal Grandfather        Social History     Socioeconomic History    Marital status:    Tobacco Use    Smoking status: Never     Passive exposure: Past    Smokeless tobacco: Never   Vaping Use    Vaping status: Never Used   Substance and Sexual Activity    Alcohol use: No    Drug use: No    Sexual activity: Yes     Partners: Male     Birth control/protection:  Surgical           Objective   Physical Exam  Vitals and nursing note reviewed.   Constitutional:       General: She is not in acute distress.     Appearance: Normal appearance.   HENT:      Head: Normocephalic and atraumatic.      Nose: Nose normal. No congestion or rhinorrhea.   Eyes:      General: No scleral icterus.     Conjunctiva/sclera: Conjunctivae normal.   Neck:      Comments: No JVD   Cardiovascular:      Rate and Rhythm: Normal rate and regular rhythm.      Heart sounds: No murmur heard.     No friction rub.   Pulmonary:      Effort: Pulmonary effort is normal.      Breath sounds: Normal breath sounds. No wheezing or rales.   Musculoskeletal:         General: No tenderness.      Cervical back: Normal range of motion and neck supple.      Right lower leg: No edema.      Left lower leg: No edema.   Skin:     General: Skin is warm and dry.      Coloration: Skin is not pale.      Findings: No erythema.   Neurological:      General: No focal deficit present.      Mental Status: She is alert. She is disoriented.      Motor: No weakness.      Coordination: Coordination normal.      Comments: She is asking where her son is, her  reminds her that he is in [place].  She seems a little bit anxious.  She is moving all extremities normally.   Psychiatric:         Behavior: Behavior normal.         Procedures           ED Course  ED Course as of 07/31/24 1643   Wed Jul 31, 2024   0650 Reviewed her records as reflected above.  She was here a week ago after falling.  Had negative CT scan.  Will obtain MRI brain.  Will give headache cocktail.  Her  tells me the migraine cocktail usually works for her. [DT]   1039 Headache better.   request something he can give her at home for her headaches.  Will prescribe butalbital.  Frustrated with current neurologist at .  Will refer them to [facility]. [DT]   1106 She told the triage nurse her headache is down to a 5 and would like it down to a 3 before  she leaves.  Have ordered Fioricet [DT]   1142 Feels well enough to go now [DT]      ED Course User Index  [DT] Maykel Monson MD                                             Medical Decision Making  Please see course notes.  I ordered and interpreted multiple labs.  Obtained history from the patient, prior records, her .  Ordered and interpreted MRI of her brain to evaluate for acute CVA.  Gave multiple IV medications and observed her for several hours in the emergency department prior to discharge.    Problems Addressed:  Confusion: chronic illness or injury with exacerbation, progression, or side effects of treatment  Other migraine with status migrainosus, not intractable: complicated acute illness or injury    Amount and/or Complexity of Data Reviewed  Independent Historian: spouse  External Data Reviewed: notes.  Labs: ordered. Decision-making details documented in ED Course.  Radiology: ordered. Decision-making details documented in ED Course.    Risk  Prescription drug management.        Final diagnoses:   Other migraine with status migrainosus, not intractable   Confusion       ED Disposition  ED Disposition       ED Disposition   Discharge    Condition   Stable    Comment   --               Yeison Garcia,   [address]  [address]  [place] [id number] [phone]               Medication List        New Prescriptions      butalbital-acetaminophen-caffeine -40 MG per tablet  Commonly known as: FIORICET, ESGIC  Take 1 tablet by mouth Every 6 (Six) Hours As Needed for Headache or Migraine.               Where to Get Your Medications        These medications were sent to [facility] [id number] - [place], [place] - [address] [phone] [facility] [phone] FX  [address] [address]      Phone: [phone]   butalbital-acetaminophen-caffeine -40 MG per tablet            Maykel Monson MD  07/31/24 3628

## 2024-09-18 ENCOUNTER — APPOINTMENT (OUTPATIENT)
Dept: GENERAL RADIOLOGY | Facility: HOSPITAL | Age: 44
End: 2024-09-18
Payer: MEDICARE

## 2024-09-18 ENCOUNTER — APPOINTMENT (OUTPATIENT)
Dept: CT IMAGING | Facility: HOSPITAL | Age: 44
End: 2024-09-18
Payer: MEDICARE

## 2024-09-18 ENCOUNTER — HOSPITAL ENCOUNTER (EMERGENCY)
Facility: HOSPITAL | Age: 44
Discharge: HOME | End: 2024-09-18
Attending: STUDENT IN AN ORGANIZED HEALTH CARE EDUCATION/TRAINING PROGRAM
Payer: MEDICARE

## 2024-09-18 VITALS
HEART RATE: 70 BPM | WEIGHT: 254 LBS | DIASTOLIC BLOOD PRESSURE: 78 MMHG | SYSTOLIC BLOOD PRESSURE: 125 MMHG | BODY MASS INDEX: 45 KG/M2 | HEIGHT: 63 IN | OXYGEN SATURATION: 93 % | TEMPERATURE: 98.4 F | RESPIRATION RATE: 18 BRPM

## 2024-09-18 DIAGNOSIS — G43.809 OTHER MIGRAINE WITHOUT STATUS MIGRAINOSUS, NOT INTRACTABLE: Primary | ICD-10-CM

## 2024-09-18 DIAGNOSIS — G93.40 ENCEPHALOPATHY: ICD-10-CM

## 2024-09-18 DIAGNOSIS — E03.9 ACQUIRED HYPOTHYROIDISM: ICD-10-CM

## 2024-09-18 LAB
ALBUMIN SERPL-MCNC: 4 G/DL (ref 3.5–5.2)
ALBUMIN/GLOB SERPL: 1.5 G/DL
ALP SERPL-CCNC: 148 U/L (ref 39–117)
ALT SERPL W P-5'-P-CCNC: 32 U/L (ref 1–33)
AMMONIA BLD-SCNC: 19 UMOL/L (ref 11–51)
AMPHET+METHAMPHET UR QL: NEGATIVE
AMPHETAMINES UR QL: NEGATIVE
ANION GAP SERPL CALCULATED.3IONS-SCNC: 10 MMOL/L (ref 5–15)
APAP SERPL-MCNC: <5 MCG/ML (ref 0–30)
AST SERPL-CCNC: 27 U/L (ref 1–32)
B-HCG UR QL: NEGATIVE
BACTERIA UR QL AUTO: ABNORMAL /HPF
BARBITURATES UR QL SCN: POSITIVE
BASOPHILS # BLD AUTO: 0.06 10*3/MM3 (ref 0–0.2)
BASOPHILS NFR BLD AUTO: 0.9 % (ref 0–1.5)
BENZODIAZ UR QL SCN: NEGATIVE
BILIRUB SERPL-MCNC: 0.2 MG/DL (ref 0–1.2)
BILIRUB UR QL STRIP: NEGATIVE
BUN SERPL-MCNC: 10 MG/DL (ref 6–20)
BUN/CREAT SERPL: 16.1 (ref 7–25)
BUPRENORPHINE SERPL-MCNC: NEGATIVE NG/ML
CALCIUM SPEC-SCNC: 9.6 MG/DL (ref 8.6–10.5)
CANNABINOIDS SERPL QL: NEGATIVE
CHLORIDE SERPL-SCNC: 104 MMOL/L (ref 98–107)
CLARITY UR: CLEAR
CO2 SERPL-SCNC: 27 MMOL/L (ref 22–29)
COCAINE UR QL: NEGATIVE
COLOR UR: YELLOW
CREAT BLDA-MCNC: 0.6 MG/DL (ref 0.6–1.3)
CREAT SERPL-MCNC: 0.62 MG/DL (ref 0.57–1)
CRP SERPL-MCNC: 0.97 MG/DL (ref 0–0.5)
D-LACTATE SERPL-SCNC: 1.4 MMOL/L (ref 0.5–2)
DEPRECATED RDW RBC AUTO: 43.3 FL (ref 37–54)
EGFRCR SERPLBLD CKD-EPI 2021: 111.4 ML/MIN/1.73
EOSINOPHIL # BLD AUTO: 0.19 10*3/MM3 (ref 0–0.4)
EOSINOPHIL NFR BLD AUTO: 2.8 % (ref 0.3–6.2)
ERYTHROCYTE [DISTWIDTH] IN BLOOD BY AUTOMATED COUNT: 13.4 % (ref 12.3–15.4)
ETHANOL BLD-MCNC: <10 MG/DL (ref 0–10)
FENTANYL UR-MCNC: NEGATIVE NG/ML
FLUAV RNA RESP QL NAA+PROBE: NOT DETECTED
FLUBV RNA RESP QL NAA+PROBE: NOT DETECTED
GLOBULIN UR ELPH-MCNC: 2.7 GM/DL
GLUCOSE SERPL-MCNC: 82 MG/DL (ref 65–99)
GLUCOSE UR STRIP-MCNC: NEGATIVE MG/DL
HCT VFR BLD AUTO: 41.7 % (ref 34–46.6)
HGB BLD-MCNC: 13.7 G/DL (ref 12–15.9)
HGB UR QL STRIP.AUTO: NEGATIVE
HYALINE CASTS UR QL AUTO: ABNORMAL /LPF
IMM GRANULOCYTES # BLD AUTO: 0.04 10*3/MM3 (ref 0–0.05)
IMM GRANULOCYTES NFR BLD AUTO: 0.6 % (ref 0–0.5)
KETONES UR QL STRIP: NEGATIVE
LEUKOCYTE ESTERASE UR QL STRIP.AUTO: ABNORMAL
LYMPHOCYTES # BLD AUTO: 2.85 10*3/MM3 (ref 0.7–3.1)
LYMPHOCYTES NFR BLD AUTO: 41.7 % (ref 19.6–45.3)
MAGNESIUM SERPL-MCNC: 2 MG/DL (ref 1.6–2.6)
MCH RBC QN AUTO: 28.8 PG (ref 26.6–33)
MCHC RBC AUTO-ENTMCNC: 32.9 G/DL (ref 31.5–35.7)
MCV RBC AUTO: 87.6 FL (ref 79–97)
METHADONE UR QL SCN: NEGATIVE
MONOCYTES # BLD AUTO: 0.46 10*3/MM3 (ref 0.1–0.9)
MONOCYTES NFR BLD AUTO: 6.7 % (ref 5–12)
NEUTROPHILS NFR BLD AUTO: 3.23 10*3/MM3 (ref 1.7–7)
NEUTROPHILS NFR BLD AUTO: 47.3 % (ref 42.7–76)
NITRITE UR QL STRIP: NEGATIVE
NRBC BLD AUTO-RTO: 0 /100 WBC (ref 0–0.2)
OPIATES UR QL: NEGATIVE
OXYCODONE UR QL SCN: NEGATIVE
PCP UR QL SCN: NEGATIVE
PH UR STRIP.AUTO: 5.5 [PH] (ref 5–8)
PHOSPHATE SERPL-MCNC: 3.3 MG/DL (ref 2.5–4.5)
PLATELET # BLD AUTO: 326 10*3/MM3 (ref 140–450)
PMV BLD AUTO: 9.7 FL (ref 6–12)
POTASSIUM SERPL-SCNC: 4.2 MMOL/L (ref 3.5–5.2)
PROCALCITONIN SERPL-MCNC: 0.05 NG/ML (ref 0–0.25)
PROT SERPL-MCNC: 6.7 G/DL (ref 6–8.5)
PROT UR QL STRIP: NEGATIVE
RBC # BLD AUTO: 4.76 10*6/MM3 (ref 3.77–5.28)
RBC # UR STRIP: ABNORMAL /HPF
REF LAB TEST METHOD: ABNORMAL
RSV RNA RESP QL NAA+PROBE: NOT DETECTED
SALICYLATES SERPL-MCNC: <0.3 MG/DL
SARS-COV-2 RNA RESP QL NAA+PROBE: NOT DETECTED
SODIUM SERPL-SCNC: 141 MMOL/L (ref 136–145)
SP GR UR STRIP: 1.02 (ref 1–1.03)
SQUAMOUS #/AREA URNS HPF: ABNORMAL /HPF
T4 FREE SERPL-MCNC: 1.45 NG/DL (ref 0.92–1.68)
TRICYCLICS UR QL SCN: NEGATIVE
TROPONIN T SERPL HS-MCNC: <6 NG/L
TSH SERPL DL<=0.05 MIU/L-ACNC: <0.005 UIU/ML (ref 0.27–4.2)
UROBILINOGEN UR QL STRIP: ABNORMAL
WBC # UR STRIP: ABNORMAL /HPF
WBC NRBC COR # BLD AUTO: 6.83 10*3/MM3 (ref 3.4–10.8)

## 2024-09-18 PROCEDURE — 25010000002 DEXAMETHASONE PER 1 MG: Performed by: STUDENT IN AN ORGANIZED HEALTH CARE EDUCATION/TRAINING PROGRAM

## 2024-09-18 PROCEDURE — 87040 BLOOD CULTURE FOR BACTERIA: CPT | Performed by: STUDENT IN AN ORGANIZED HEALTH CARE EDUCATION/TRAINING PROGRAM

## 2024-09-18 PROCEDURE — 84145 PROCALCITONIN (PCT): CPT | Performed by: STUDENT IN AN ORGANIZED HEALTH CARE EDUCATION/TRAINING PROGRAM

## 2024-09-18 PROCEDURE — 36415 COLL VENOUS BLD VENIPUNCTURE: CPT

## 2024-09-18 PROCEDURE — 70450 CT HEAD/BRAIN W/O DYE: CPT

## 2024-09-18 PROCEDURE — 82077 ASSAY SPEC XCP UR&BREATH IA: CPT | Performed by: STUDENT IN AN ORGANIZED HEALTH CARE EDUCATION/TRAINING PROGRAM

## 2024-09-18 PROCEDURE — 84439 ASSAY OF FREE THYROXINE: CPT | Performed by: STUDENT IN AN ORGANIZED HEALTH CARE EDUCATION/TRAINING PROGRAM

## 2024-09-18 PROCEDURE — 25010000002 DROPERIDOL PER 5 MG: Performed by: STUDENT IN AN ORGANIZED HEALTH CARE EDUCATION/TRAINING PROGRAM

## 2024-09-18 PROCEDURE — 83735 ASSAY OF MAGNESIUM: CPT | Performed by: STUDENT IN AN ORGANIZED HEALTH CARE EDUCATION/TRAINING PROGRAM

## 2024-09-18 PROCEDURE — 85025 COMPLETE CBC W/AUTO DIFF WBC: CPT | Performed by: STUDENT IN AN ORGANIZED HEALTH CARE EDUCATION/TRAINING PROGRAM

## 2024-09-18 PROCEDURE — 96365 THER/PROPH/DIAG IV INF INIT: CPT

## 2024-09-18 PROCEDURE — 70496 CT ANGIOGRAPHY HEAD: CPT

## 2024-09-18 PROCEDURE — 99285 EMERGENCY DEPT VISIT HI MDM: CPT

## 2024-09-18 PROCEDURE — 71045 X-RAY EXAM CHEST 1 VIEW: CPT

## 2024-09-18 PROCEDURE — 25010000002 DIPHENHYDRAMINE PER 50 MG: Performed by: STUDENT IN AN ORGANIZED HEALTH CARE EDUCATION/TRAINING PROGRAM

## 2024-09-18 PROCEDURE — 83605 ASSAY OF LACTIC ACID: CPT | Performed by: STUDENT IN AN ORGANIZED HEALTH CARE EDUCATION/TRAINING PROGRAM

## 2024-09-18 PROCEDURE — 25010000002 KETOROLAC TROMETHAMINE PER 15 MG: Performed by: STUDENT IN AN ORGANIZED HEALTH CARE EDUCATION/TRAINING PROGRAM

## 2024-09-18 PROCEDURE — 84100 ASSAY OF PHOSPHORUS: CPT | Performed by: STUDENT IN AN ORGANIZED HEALTH CARE EDUCATION/TRAINING PROGRAM

## 2024-09-18 PROCEDURE — 25510000001 IOPAMIDOL PER 1 ML: Performed by: STUDENT IN AN ORGANIZED HEALTH CARE EDUCATION/TRAINING PROGRAM

## 2024-09-18 PROCEDURE — 25010000002 MAGNESIUM SULFATE IN D5W 1G/100ML (PREMIX) 1-5 GM/100ML-% SOLUTION: Performed by: STUDENT IN AN ORGANIZED HEALTH CARE EDUCATION/TRAINING PROGRAM

## 2024-09-18 PROCEDURE — 80143 DRUG ASSAY ACETAMINOPHEN: CPT | Performed by: STUDENT IN AN ORGANIZED HEALTH CARE EDUCATION/TRAINING PROGRAM

## 2024-09-18 PROCEDURE — 82565 ASSAY OF CREATININE: CPT

## 2024-09-18 PROCEDURE — 80053 COMPREHEN METABOLIC PANEL: CPT | Performed by: STUDENT IN AN ORGANIZED HEALTH CARE EDUCATION/TRAINING PROGRAM

## 2024-09-18 PROCEDURE — 80307 DRUG TEST PRSMV CHEM ANLYZR: CPT | Performed by: STUDENT IN AN ORGANIZED HEALTH CARE EDUCATION/TRAINING PROGRAM

## 2024-09-18 PROCEDURE — 96375 TX/PRO/DX INJ NEW DRUG ADDON: CPT

## 2024-09-18 PROCEDURE — 81001 URINALYSIS AUTO W/SCOPE: CPT | Performed by: STUDENT IN AN ORGANIZED HEALTH CARE EDUCATION/TRAINING PROGRAM

## 2024-09-18 PROCEDURE — 84443 ASSAY THYROID STIM HORMONE: CPT | Performed by: STUDENT IN AN ORGANIZED HEALTH CARE EDUCATION/TRAINING PROGRAM

## 2024-09-18 PROCEDURE — 25810000003 LACTATED RINGERS SOLUTION: Performed by: STUDENT IN AN ORGANIZED HEALTH CARE EDUCATION/TRAINING PROGRAM

## 2024-09-18 PROCEDURE — 80179 DRUG ASSAY SALICYLATE: CPT | Performed by: STUDENT IN AN ORGANIZED HEALTH CARE EDUCATION/TRAINING PROGRAM

## 2024-09-18 PROCEDURE — 93005 ELECTROCARDIOGRAM TRACING: CPT | Performed by: STUDENT IN AN ORGANIZED HEALTH CARE EDUCATION/TRAINING PROGRAM

## 2024-09-18 PROCEDURE — 84484 ASSAY OF TROPONIN QUANT: CPT | Performed by: STUDENT IN AN ORGANIZED HEALTH CARE EDUCATION/TRAINING PROGRAM

## 2024-09-18 PROCEDURE — 87637 SARSCOV2&INF A&B&RSV AMP PRB: CPT | Performed by: STUDENT IN AN ORGANIZED HEALTH CARE EDUCATION/TRAINING PROGRAM

## 2024-09-18 PROCEDURE — 86140 C-REACTIVE PROTEIN: CPT | Performed by: STUDENT IN AN ORGANIZED HEALTH CARE EDUCATION/TRAINING PROGRAM

## 2024-09-18 PROCEDURE — 81025 URINE PREGNANCY TEST: CPT | Performed by: STUDENT IN AN ORGANIZED HEALTH CARE EDUCATION/TRAINING PROGRAM

## 2024-09-18 PROCEDURE — 82140 ASSAY OF AMMONIA: CPT | Performed by: STUDENT IN AN ORGANIZED HEALTH CARE EDUCATION/TRAINING PROGRAM

## 2024-09-18 RX ORDER — NITROFURANTOIN 25; 75 MG/1; MG/1
100 CAPSULE ORAL 2 TIMES DAILY
Qty: 14 CAPSULE | Refills: 0 | Status: SHIPPED | OUTPATIENT
Start: 2024-09-18

## 2024-09-18 RX ORDER — DROPERIDOL 2.5 MG/ML
2.5 INJECTION, SOLUTION INTRAMUSCULAR; INTRAVENOUS ONCE
Status: COMPLETED | OUTPATIENT
Start: 2024-09-18 | End: 2024-09-18

## 2024-09-18 RX ORDER — ACETAMINOPHEN 500 MG
1000 TABLET ORAL ONCE
Status: COMPLETED | OUTPATIENT
Start: 2024-09-18 | End: 2024-09-18

## 2024-09-18 RX ORDER — DEXAMETHASONE SODIUM PHOSPHATE 10 MG/ML
10 INJECTION INTRAMUSCULAR; INTRAVENOUS ONCE
Status: COMPLETED | OUTPATIENT
Start: 2024-09-18 | End: 2024-09-18

## 2024-09-18 RX ORDER — IOPAMIDOL 755 MG/ML
100 INJECTION, SOLUTION INTRAVASCULAR
Status: COMPLETED | OUTPATIENT
Start: 2024-09-18 | End: 2024-09-18

## 2024-09-18 RX ORDER — KETOROLAC TROMETHAMINE 15 MG/ML
15 INJECTION, SOLUTION INTRAMUSCULAR; INTRAVENOUS ONCE
Status: COMPLETED | OUTPATIENT
Start: 2024-09-18 | End: 2024-09-18

## 2024-09-18 RX ORDER — DIPHENHYDRAMINE HYDROCHLORIDE 50 MG/ML
25 INJECTION INTRAMUSCULAR; INTRAVENOUS ONCE
Status: COMPLETED | OUTPATIENT
Start: 2024-09-18 | End: 2024-09-18

## 2024-09-18 RX ORDER — MAGNESIUM SULFATE 1 G/100ML
1 INJECTION INTRAVENOUS ONCE
Status: COMPLETED | OUTPATIENT
Start: 2024-09-18 | End: 2024-09-18

## 2024-09-18 RX ADMIN — MAGNESIUM SULFATE 1 G: 1 INJECTION INTRAVENOUS at 09:31

## 2024-09-18 RX ADMIN — SODIUM CHLORIDE, POTASSIUM CHLORIDE, SODIUM LACTATE AND CALCIUM CHLORIDE 1000 ML: 600; 310; 30; 20 INJECTION, SOLUTION INTRAVENOUS at 09:31

## 2024-09-18 RX ADMIN — DEXAMETHASONE SODIUM PHOSPHATE 10 MG: 10 INJECTION INTRAMUSCULAR; INTRAVENOUS at 09:27

## 2024-09-18 RX ADMIN — KETOROLAC TROMETHAMINE 15 MG: 15 INJECTION, SOLUTION INTRAMUSCULAR; INTRAVENOUS at 09:29

## 2024-09-18 RX ADMIN — ACETAMINOPHEN 1000 MG: 500 TABLET ORAL at 09:28

## 2024-09-18 RX ADMIN — DIPHENHYDRAMINE HYDROCHLORIDE 25 MG: 50 INJECTION INTRAMUSCULAR; INTRAVENOUS at 09:27

## 2024-09-18 RX ADMIN — DROPERIDOL 2.5 MG: 2.5 INJECTION, SOLUTION INTRAMUSCULAR; INTRAVENOUS at 09:26

## 2024-09-18 RX ADMIN — IOPAMIDOL 90 ML: 755 INJECTION, SOLUTION INTRAVENOUS at 10:22

## 2024-09-21 LAB
QT INTERVAL: 400 MS
QTC INTERVAL: 456 MS

## 2024-09-23 LAB
BACTERIA SPEC AEROBE CULT: NORMAL
BACTERIA SPEC AEROBE CULT: NORMAL

## 2024-09-26 ENCOUNTER — HOSPITAL ENCOUNTER (EMERGENCY)
Facility: HOSPITAL | Age: 44
Discharge: HOME | End: 2024-09-26
Attending: EMERGENCY MEDICINE
Payer: MEDICARE

## 2024-09-26 ENCOUNTER — APPOINTMENT (OUTPATIENT)
Dept: GENERAL RADIOLOGY | Facility: HOSPITAL | Age: 44
End: 2024-09-26
Payer: MEDICARE

## 2024-09-26 VITALS
SYSTOLIC BLOOD PRESSURE: 122 MMHG | HEART RATE: 89 BPM | RESPIRATION RATE: 16 BRPM | TEMPERATURE: 98.7 F | DIASTOLIC BLOOD PRESSURE: 81 MMHG | BODY MASS INDEX: 44.3 KG/M2 | WEIGHT: 250 LBS | HEIGHT: 63 IN | OXYGEN SATURATION: 97 %

## 2024-09-26 DIAGNOSIS — G47.33 OBSTRUCTIVE SLEEP APNEA: ICD-10-CM

## 2024-09-26 DIAGNOSIS — J06.9 UPPER RESPIRATORY TRACT INFECTION, UNSPECIFIED TYPE: ICD-10-CM

## 2024-09-26 DIAGNOSIS — R06.02 SHORTNESS OF BREATH: Primary | ICD-10-CM

## 2024-09-26 LAB
ALBUMIN SERPL-MCNC: 4.1 G/DL (ref 3.5–5.2)
ALBUMIN/GLOB SERPL: 1.5 G/DL
ALP SERPL-CCNC: 167 U/L (ref 39–117)
ALT SERPL W P-5'-P-CCNC: 37 U/L (ref 1–33)
ANION GAP SERPL CALCULATED.3IONS-SCNC: 13 MMOL/L (ref 5–15)
AST SERPL-CCNC: 26 U/L (ref 1–32)
BASOPHILS # BLD AUTO: 0.08 10*3/MM3 (ref 0–0.2)
BASOPHILS NFR BLD AUTO: 0.8 % (ref 0–1.5)
BILIRUB SERPL-MCNC: 0.3 MG/DL (ref 0–1.2)
BUN SERPL-MCNC: 11 MG/DL (ref 6–20)
BUN/CREAT SERPL: 19.3 (ref 7–25)
CALCIUM SPEC-SCNC: 9.7 MG/DL (ref 8.6–10.5)
CHLORIDE SERPL-SCNC: 100 MMOL/L (ref 98–107)
CO2 SERPL-SCNC: 24 MMOL/L (ref 22–29)
CREAT SERPL-MCNC: 0.57 MG/DL (ref 0.57–1)
DEPRECATED RDW RBC AUTO: 45.6 FL (ref 37–54)
EGFRCR SERPLBLD CKD-EPI 2021: 113.7 ML/MIN/1.73
EOSINOPHIL # BLD AUTO: 0.13 10*3/MM3 (ref 0–0.4)
EOSINOPHIL NFR BLD AUTO: 1.3 % (ref 0.3–6.2)
ERYTHROCYTE [DISTWIDTH] IN BLOOD BY AUTOMATED COUNT: 14.3 % (ref 12.3–15.4)
FLUAV RNA RESP QL NAA+PROBE: NOT DETECTED
FLUBV RNA RESP QL NAA+PROBE: NOT DETECTED
GLOBULIN UR ELPH-MCNC: 2.8 GM/DL
GLUCOSE SERPL-MCNC: 101 MG/DL (ref 65–99)
HCT VFR BLD AUTO: 41 % (ref 34–46.6)
HGB BLD-MCNC: 13.2 G/DL (ref 12–15.9)
HOLD SPECIMEN: NORMAL
IMM GRANULOCYTES # BLD AUTO: 0.07 10*3/MM3 (ref 0–0.05)
IMM GRANULOCYTES NFR BLD AUTO: 0.7 % (ref 0–0.5)
LYMPHOCYTES # BLD AUTO: 3.17 10*3/MM3 (ref 0.7–3.1)
LYMPHOCYTES NFR BLD AUTO: 31.4 % (ref 19.6–45.3)
MCH RBC QN AUTO: 28.6 PG (ref 26.6–33)
MCHC RBC AUTO-ENTMCNC: 32.2 G/DL (ref 31.5–35.7)
MCV RBC AUTO: 88.7 FL (ref 79–97)
MONOCYTES # BLD AUTO: 0.75 10*3/MM3 (ref 0.1–0.9)
MONOCYTES NFR BLD AUTO: 7.4 % (ref 5–12)
NEUTROPHILS NFR BLD AUTO: 5.9 10*3/MM3 (ref 1.7–7)
NEUTROPHILS NFR BLD AUTO: 58.4 % (ref 42.7–76)
NRBC BLD AUTO-RTO: 0 /100 WBC (ref 0–0.2)
NT-PROBNP SERPL-MCNC: <36 PG/ML (ref 0–450)
PLATELET # BLD AUTO: 375 10*3/MM3 (ref 140–450)
PMV BLD AUTO: 9.9 FL (ref 6–12)
POTASSIUM SERPL-SCNC: 3.9 MMOL/L (ref 3.5–5.2)
PROT SERPL-MCNC: 6.9 G/DL (ref 6–8.5)
QT INTERVAL: 362 MS
QTC INTERVAL: 462 MS
RBC # BLD AUTO: 4.62 10*6/MM3 (ref 3.77–5.28)
SARS-COV-2 RNA RESP QL NAA+PROBE: NOT DETECTED
SODIUM SERPL-SCNC: 137 MMOL/L (ref 136–145)
TROPONIN T SERPL HS-MCNC: <6 NG/L
WBC NRBC COR # BLD AUTO: 10.1 10*3/MM3 (ref 3.4–10.8)
WHOLE BLOOD HOLD COAG: NORMAL
WHOLE BLOOD HOLD SPECIMEN: NORMAL

## 2024-09-26 PROCEDURE — 84484 ASSAY OF TROPONIN QUANT: CPT | Performed by: EMERGENCY MEDICINE

## 2024-09-26 PROCEDURE — 93005 ELECTROCARDIOGRAM TRACING: CPT | Performed by: EMERGENCY MEDICINE

## 2024-09-26 PROCEDURE — 85025 COMPLETE CBC W/AUTO DIFF WBC: CPT | Performed by: EMERGENCY MEDICINE

## 2024-09-26 PROCEDURE — 87636 SARSCOV2 & INF A&B AMP PRB: CPT | Performed by: EMERGENCY MEDICINE

## 2024-09-26 PROCEDURE — 80053 COMPREHEN METABOLIC PANEL: CPT | Performed by: EMERGENCY MEDICINE

## 2024-09-26 PROCEDURE — 99284 EMERGENCY DEPT VISIT MOD MDM: CPT

## 2024-09-26 PROCEDURE — 71045 X-RAY EXAM CHEST 1 VIEW: CPT

## 2024-09-26 PROCEDURE — 93005 ELECTROCARDIOGRAM TRACING: CPT

## 2024-09-26 PROCEDURE — 83880 ASSAY OF NATRIURETIC PEPTIDE: CPT | Performed by: EMERGENCY MEDICINE

## 2024-09-26 RX ORDER — SODIUM CHLORIDE 0.9 % (FLUSH) 0.9 %
10 SYRINGE (ML) INJECTION AS NEEDED
Status: DISCONTINUED | OUTPATIENT
Start: 2024-09-26 | End: 2024-09-26 | Stop reason: HOSPADM

## 2024-09-26 RX ORDER — GUAIFENESIN 600 MG/1
1200 TABLET, EXTENDED RELEASE ORAL ONCE
Status: COMPLETED | OUTPATIENT
Start: 2024-09-26 | End: 2024-09-26

## 2024-09-26 RX ADMIN — GUAIFENESIN 1200 MG: 600 TABLET, EXTENDED RELEASE ORAL at 07:59

## 2024-09-26 NOTE — ED PROVIDER NOTES
Subjective   History of Present Illness  Mrs. Glasgow presents with shortness of breath.  She tells me for the last 3 days she has had runny nose and cough.  She woke up short of breath and has been advised that she had quit breathing.  She tells me her nose is stuffy.  She denies chest pain.  She has history of obstructive sleep apnea and had a positive sleep study in 2022.  Insurance would not cover a CPAP machine however.      Review of Systems    Past Medical History:   Diagnosis Date    CHF (congestive heart failure)     Depression     Elevated cholesterol     Hyperlipemia     Hypothyroid     Sleep apnea        Allergies   Allergen Reactions    Wellbutrin [Bupropion] Hives       Past Surgical History:   Procedure Laterality Date    BRAIN SURGERY      CARDIAC CATHETERIZATION N/A 8/10/2023    Procedure: Left Heart Cath;  Surgeon: Yury Barclay MD;  Location: [facility] CATH INVASIVE LOCATION;  Service: Cardiology;  Laterality: N/A;    DILATION AND CURETTAGE, DIAGNOSTIC / THERAPEUTIC         Family History   Adopted: Yes   Problem Relation Age of Onset    No Known Problems Mother     No Known Problems Father     No Known Problems Maternal Grandmother     No Known Problems Maternal Grandfather     No Known Problems Paternal Grandmother     No Known Problems Paternal Grandfather        Social History     Socioeconomic History    Marital status:    Tobacco Use    Smoking status: Never     Passive exposure: Past    Smokeless tobacco: Never   Vaping Use    Vaping status: Never Used   Substance and Sexual Activity    Alcohol use: No    Drug use: No    Sexual activity: Yes     Partners: Male     Birth control/protection: Surgical           Objective   Physical Exam  Vitals and nursing note reviewed.   Constitutional:       General: She is not in acute distress.     Appearance: Normal appearance.   HENT:      Head: Normocephalic and atraumatic.      Nose: Nose normal. No congestion or rhinorrhea.   Eyes:      General: No  scleral icterus.     Conjunctiva/sclera: Conjunctivae normal.   Neck:      Comments: No JVD   Cardiovascular:      Rate and Rhythm: Normal rate and regular rhythm.      Heart sounds: No murmur heard.     No friction rub.   Pulmonary:      Effort: Pulmonary effort is normal.      Breath sounds: Normal breath sounds. No wheezing or rales.   Abdominal:      General: Bowel sounds are normal.      Palpations: Abdomen is soft.      Tenderness: There is no abdominal tenderness. There is no guarding or rebound.   Musculoskeletal:         General: No tenderness.      Cervical back: Normal range of motion and neck supple.      Right lower leg: No edema.      Left lower leg: No edema.   Skin:     General: Skin is warm and dry.      Coloration: Skin is not pale.      Findings: No erythema.   Neurological:      General: No focal deficit present.      Mental Status: She is alert and oriented to person, place, and time.      Motor: No weakness.      Coordination: Coordination normal.   Psychiatric:         Mood and Affect: Mood normal.         Behavior: Behavior normal.         Thought Content: Thought content normal.         Procedures           ED Course  ED Course as of 09/26/24 0932   Thu Sep 26, 2024   0653 Have reviewed her EKG which shows anterior T wave inversion and inferior flattening.  I have compared this with her most recent EKG and there is no change.  Have reviewed records and she had negative heart cath and August of last year.  Had a normal echocardiogram at that time as well. [DT]   0807 Labs unremarkable.  Chest x-ray negative.  Seems to be related to nasal congestion in the setting of untreated sleep apnea.  Nasal decongestant will help considerably. [DT]   0808 She remains comfortable.  I spoke with her about treatment of this.  She tells me she has worked with the sleep clinic at  to try to secure a lower prized CPAP machine but they were unable to do so.  She has a prescription for CPAP therapy, I urged  her to seek out the online supply companies as they frequently have significantly reduced prices on machines.  I spoke with her about using Afrin over the next couple of days [DT]      ED Course User Index  [DT] Maykel Monson MD                                             Medical Decision Making  Please see course notes.  I ordered and interpreted chest x-ray and labs.  Had reevaluation.  Observed her on a monitor for couple of hours    Problems Addressed:  Obstructive sleep apnea: chronic illness or injury with exacerbation, progression, or side effects of treatment  Shortness of breath: complicated acute illness or injury that poses a threat to life or bodily functions  Upper respiratory tract infection, unspecified type: complicated acute illness or injury    Amount and/or Complexity of Data Reviewed  External Data Reviewed: notes.  Labs: ordered. Decision-making details documented in ED Course.  Radiology: ordered. Decision-making details documented in ED Course.  ECG/medicine tests: ordered.    Risk  OTC drugs.  Prescription drug management.        Final diagnoses:   Shortness of breath   Obstructive sleep apnea   Upper respiratory tract infection, unspecified type       ED Disposition  ED Disposition       ED Disposition   Discharge    Condition   Stable    Comment   --               Yeison Garcia,   [address]  [place] [id number] [phone]               Medication List      No changes were made to your prescriptions during this visit.            Maykel Monson MD  09/26/24 6776

## 2024-09-26 NOTE — DISCHARGE INSTRUCTIONS
Use Afrin for the next couple of days.  I would recommend a vaporizer and Mucinex.  Seek out lower priced CPAP machines online.  Continue to work with your primary care provider.  Return if any concerns.

## 2024-10-16 ENCOUNTER — APPOINTMENT (OUTPATIENT)
Dept: GENERAL RADIOLOGY | Facility: HOSPITAL | Age: 44
End: 2024-10-16
Payer: MEDICARE

## 2024-10-16 ENCOUNTER — HOSPITAL ENCOUNTER (EMERGENCY)
Facility: HOSPITAL | Age: 44
Discharge: HOME | End: 2024-10-16
Attending: EMERGENCY MEDICINE
Payer: MEDICARE

## 2024-10-16 VITALS
SYSTOLIC BLOOD PRESSURE: 138 MMHG | HEIGHT: 63 IN | OXYGEN SATURATION: 97 % | HEART RATE: 94 BPM | DIASTOLIC BLOOD PRESSURE: 72 MMHG | WEIGHT: 257 LBS | RESPIRATION RATE: 20 BRPM | TEMPERATURE: 98.5 F | BODY MASS INDEX: 45.54 KG/M2

## 2024-10-16 DIAGNOSIS — R07.9 CHEST PAIN IN ADULT: Primary | ICD-10-CM

## 2024-10-16 LAB
ALBUMIN SERPL-MCNC: 3.9 G/DL (ref 3.5–5.2)
ALBUMIN/GLOB SERPL: 1.7 G/DL
ALP SERPL-CCNC: 142 U/L (ref 39–117)
ALT SERPL W P-5'-P-CCNC: 31 U/L (ref 1–33)
ANION GAP SERPL CALCULATED.3IONS-SCNC: 11 MMOL/L (ref 5–15)
AST SERPL-CCNC: 26 U/L (ref 1–32)
BASOPHILS # BLD AUTO: 0.05 10*3/MM3 (ref 0–0.2)
BASOPHILS NFR BLD AUTO: 0.6 % (ref 0–1.5)
BILIRUB SERPL-MCNC: 0.2 MG/DL (ref 0–1.2)
BUN SERPL-MCNC: 10 MG/DL (ref 6–20)
BUN/CREAT SERPL: 22.2 (ref 7–25)
CALCIUM SPEC-SCNC: 9.5 MG/DL (ref 8.6–10.5)
CHLORIDE SERPL-SCNC: 103 MMOL/L (ref 98–107)
CO2 SERPL-SCNC: 25 MMOL/L (ref 22–29)
CREAT SERPL-MCNC: 0.45 MG/DL (ref 0.57–1)
DEPRECATED RDW RBC AUTO: 45.2 FL (ref 37–54)
EGFRCR SERPLBLD CKD-EPI 2021: 120.3 ML/MIN/1.73
EOSINOPHIL # BLD AUTO: 0.15 10*3/MM3 (ref 0–0.4)
EOSINOPHIL NFR BLD AUTO: 1.8 % (ref 0.3–6.2)
ERYTHROCYTE [DISTWIDTH] IN BLOOD BY AUTOMATED COUNT: 14 % (ref 12.3–15.4)
GEN 5 2HR TROPONIN T REFLEX: 8 NG/L
GLOBULIN UR ELPH-MCNC: 2.3 GM/DL
GLUCOSE SERPL-MCNC: 95 MG/DL (ref 65–99)
HCT VFR BLD AUTO: 37.6 % (ref 34–46.6)
HGB BLD-MCNC: 12.2 G/DL (ref 12–15.9)
HOLD SPECIMEN: NORMAL
IMM GRANULOCYTES # BLD AUTO: 0.04 10*3/MM3 (ref 0–0.05)
IMM GRANULOCYTES NFR BLD AUTO: 0.5 % (ref 0–0.5)
LIPASE SERPL-CCNC: 32 U/L (ref 13–60)
LYMPHOCYTES # BLD AUTO: 3.34 10*3/MM3 (ref 0.7–3.1)
LYMPHOCYTES NFR BLD AUTO: 40 % (ref 19.6–45.3)
MCH RBC QN AUTO: 29 PG (ref 26.6–33)
MCHC RBC AUTO-ENTMCNC: 32.4 G/DL (ref 31.5–35.7)
MCV RBC AUTO: 89.3 FL (ref 79–97)
MONOCYTES # BLD AUTO: 0.63 10*3/MM3 (ref 0.1–0.9)
MONOCYTES NFR BLD AUTO: 7.5 % (ref 5–12)
NEUTROPHILS NFR BLD AUTO: 4.15 10*3/MM3 (ref 1.7–7)
NEUTROPHILS NFR BLD AUTO: 49.6 % (ref 42.7–76)
NRBC BLD AUTO-RTO: 0 /100 WBC (ref 0–0.2)
NT-PROBNP SERPL-MCNC: <36 PG/ML (ref 0–450)
PLATELET # BLD AUTO: 357 10*3/MM3 (ref 140–450)
PMV BLD AUTO: 10.1 FL (ref 6–12)
POTASSIUM SERPL-SCNC: 4.1 MMOL/L (ref 3.5–5.2)
PROT SERPL-MCNC: 6.2 G/DL (ref 6–8.5)
RBC # BLD AUTO: 4.21 10*6/MM3 (ref 3.77–5.28)
SODIUM SERPL-SCNC: 139 MMOL/L (ref 136–145)
TROPONIN T DELTA: NORMAL
TROPONIN T SERPL HS-MCNC: <6 NG/L
WBC NRBC COR # BLD AUTO: 8.36 10*3/MM3 (ref 3.4–10.8)
WHOLE BLOOD HOLD COAG: NORMAL
WHOLE BLOOD HOLD SPECIMEN: NORMAL

## 2024-10-16 PROCEDURE — 83690 ASSAY OF LIPASE: CPT | Performed by: EMERGENCY MEDICINE

## 2024-10-16 PROCEDURE — 84484 ASSAY OF TROPONIN QUANT: CPT | Performed by: EMERGENCY MEDICINE

## 2024-10-16 PROCEDURE — 80053 COMPREHEN METABOLIC PANEL: CPT | Performed by: EMERGENCY MEDICINE

## 2024-10-16 PROCEDURE — 71045 X-RAY EXAM CHEST 1 VIEW: CPT

## 2024-10-16 PROCEDURE — 36415 COLL VENOUS BLD VENIPUNCTURE: CPT

## 2024-10-16 PROCEDURE — 99284 EMERGENCY DEPT VISIT MOD MDM: CPT

## 2024-10-16 PROCEDURE — 93005 ELECTROCARDIOGRAM TRACING: CPT | Performed by: EMERGENCY MEDICINE

## 2024-10-16 PROCEDURE — 85025 COMPLETE CBC W/AUTO DIFF WBC: CPT | Performed by: EMERGENCY MEDICINE

## 2024-10-16 PROCEDURE — 83880 ASSAY OF NATRIURETIC PEPTIDE: CPT | Performed by: EMERGENCY MEDICINE

## 2024-10-16 RX ORDER — ASPIRIN 81 MG/1
324 TABLET, CHEWABLE ORAL ONCE
Status: COMPLETED | OUTPATIENT
Start: 2024-10-16 | End: 2024-10-16

## 2024-10-16 RX ORDER — SODIUM CHLORIDE 0.9 % (FLUSH) 0.9 %
10 SYRINGE (ML) INJECTION AS NEEDED
Status: DISCONTINUED | OUTPATIENT
Start: 2024-10-16 | End: 2024-10-16 | Stop reason: HOSPADM

## 2024-10-16 RX ADMIN — ASPIRIN 81 MG 324 MG: 81 TABLET ORAL at 04:17

## 2024-10-16 NOTE — DISCHARGE INSTRUCTIONS
Call your primary doctor and/or cardiologist for follow-up appointment.  Return to the ER as needed for new or worsening symptoms

## 2024-10-16 NOTE — ED PROVIDER NOTES
Subjective   History of Present Illness  Patient presents for evaluation of chest pain.  Patient states that started around 2 AM while she was lying in bed trying to sleep, radiates to her left face behind her ear and then around both sides of her chest and into her back.  She reports a history of similar symptoms and is been evaluated by cardiologist.  She reports a recent cardiac catheterization which was normal but patient continues to express concern that her heart is the problem.  She is not feeling shortness of breath right now.  She has not had fevers or chills or swelling of her upper or lower extremities.    History provided by:  Patient      Review of Systems    Past Medical History:   Diagnosis Date    CHF (congestive heart failure)     Depression     Elevated cholesterol     Hyperlipemia     Hypothyroid     Sleep apnea        Allergies   Allergen Reactions    Wellbutrin [Bupropion] Hives       Past Surgical History:   Procedure Laterality Date    BRAIN SURGERY      CARDIAC CATHETERIZATION N/A 8/10/2023    Procedure: Left Heart Cath;  Surgeon: Yury Barclay MD;  Location: [facility] CATH INVASIVE LOCATION;  Service: Cardiology;  Laterality: N/A;    DILATION AND CURETTAGE, DIAGNOSTIC / THERAPEUTIC         Family History   Adopted: Yes   Problem Relation Age of Onset    No Known Problems Mother     No Known Problems Father     No Known Problems Maternal Grandmother     No Known Problems Maternal Grandfather     No Known Problems Paternal Grandmother     No Known Problems Paternal Grandfather        Social History     Socioeconomic History    Marital status:    Tobacco Use    Smoking status: Never     Passive exposure: Past    Smokeless tobacco: Never   Vaping Use    Vaping status: Never Used   Substance and Sexual Activity    Alcohol use: No    Drug use: No    Sexual activity: Yes     Partners: Male     Birth control/protection: Surgical           Objective   Physical Exam  Constitutional:       General:  She is not in acute distress.     Appearance: She is obese.   HENT:      Head: Normocephalic and atraumatic.   Eyes:      Conjunctiva/sclera: Conjunctivae normal.      Pupils: Pupils are equal, round, and reactive to light.   Cardiovascular:      Rate and Rhythm: Normal rate and regular rhythm.      Pulses: Normal pulses.      Heart sounds: No murmur heard.     No gallop.   Pulmonary:      Effort: Pulmonary effort is normal. No respiratory distress.      Breath sounds: No wheezing or rales.   Abdominal:      General: Abdomen is flat. There is no distension.      Tenderness: There is no abdominal tenderness.   Musculoskeletal:         General: No swelling or deformity. Normal range of motion.   Skin:     General: Skin is warm and dry.      Capillary Refill: Capillary refill takes less than 2 seconds.   Neurological:      General: No focal deficit present.      Mental Status: She is alert and oriented to person, place, and time.   Psychiatric:         Mood and Affect: Mood normal.         Behavior: Behavior normal.         Procedures           ED Course  ED Course as of 10/16/24 0657   Wed Oct 16, 2024   0407 Twelve-lead ECG independently interpreted by myself demonstrates normal sinus rhythm, no ST segment elevation or depression.  Normal axis [KB]   0654 Laboratory workup independently interpreted by myself demonstrates no significant abnormalities.  Serial troponins within normal limits [KB]   0655 Chest x-ray independently interpreted by myself demonstrates no acute cardiopulmonary abnormality [KB]      ED Course User Index  [KB] Carl Boss MD                HEART Score: 2                              Medical Decision Making  Differential diagnosis includes acute coronary syndrome, pneumonia, pneumothorax, pleurisy, pericarditis, pulmonary embolism, anxiety.  In consideration of pulmonary embolism patient is Wells low risk and PERC negative, no indication for further workup at this time.  Lab and imaging  studies were conducted.    Patient was reevaluated and well-appearing, normal vitals.  She has an unremarkable emergency room workup.  She is counseled to follow-up with her primary doctor and/or cardiologist.  She has a low heart score .she was discharged in good condition    Problems Addressed:  Chest pain in adult: complicated acute illness or injury    Amount and/or Complexity of Data Reviewed  External Data Reviewed: notes.     Details: 10/12/2024 reviewed most recent discharge summary when patient was hospitalized in the epilepsy monitoring unit  Labs: ordered. Decision-making details documented in ED Course.  Radiology: ordered and independent interpretation performed. Decision-making details documented in ED Course.  ECG/medicine tests: ordered and independent interpretation performed. Decision-making details documented in ED Course.    Risk  OTC drugs.  Prescription drug management.  Decision regarding hospitalization.        Final diagnoses:   Chest pain in adult       ED Disposition  ED Disposition       ED Disposition   Discharge    Condition   Stable    Comment   --             Recent Results (from the past 24 hours)   ECG 12 Lead ED Triage Standing Order; Chest Pain    Collection Time: 10/16/24  3:59 AM   Result Value Ref Range    QT Interval 380 ms    QTC Interval 469 ms   High Sensitivity Troponin T    Collection Time: 10/16/24  4:17 AM    Specimen: Blood   Result Value Ref Range    HS Troponin T <6 <14 ng/L   Comprehensive Metabolic Panel    Collection Time: 10/16/24  4:17 AM    Specimen: Blood   Result Value Ref Range    Glucose 95 65 - 99 mg/dL    BUN 10 6 - 20 mg/dL    Creatinine 0.45 (L) 0.57 - 1.00 mg/dL    Sodium 139 136 - 145 mmol/L    Potassium 4.1 3.5 - 5.2 mmol/L    Chloride 103 98 - 107 mmol/L    CO2 25.0 22.0 - 29.0 mmol/L    Calcium 9.5 8.6 - 10.5 mg/dL    Total Protein 6.2 6.0 - 8.5 g/dL    Albumin 3.9 3.5 - 5.2 g/dL    ALT (SGPT) 31 1 - 33 U/L    AST (SGOT) 26 1 - 32 U/L    Alkaline  Phosphatase 142 (H) 39 - 117 U/L    Total Bilirubin 0.2 0.0 - 1.2 mg/dL    Globulin 2.3 gm/dL    A/G Ratio 1.7 g/dL    BUN/Creatinine Ratio 22.2 7.0 - 25.0    Anion Gap 11.0 5.0 - 15.0 mmol/L    eGFR 120.3 >60.0 mL/min/1.73   Lipase    Collection Time: 10/16/24  4:17 AM    Specimen: Blood   Result Value Ref Range    Lipase 32 13 - 60 U/L   BNP    Collection Time: 10/16/24  4:17 AM    Specimen: Blood   Result Value Ref Range    proBNP <36.0 0.0 - 450.0 pg/mL   Green Top (Gel)    Collection Time: 10/16/24  4:17 AM   Result Value Ref Range    Extra Tube Hold for add-ons.    Lavender Top    Collection Time: 10/16/24  4:17 AM   Result Value Ref Range    Extra Tube hold for add-on    Gold Top - SST    Collection Time: 10/16/24  4:17 AM   Result Value Ref Range    Extra Tube Hold for add-ons.    Gray Top    Collection Time: 10/16/24  4:17 AM   Result Value Ref Range    Extra Tube Hold for add-ons.    Light Blue Top    Collection Time: 10/16/24  4:17 AM   Result Value Ref Range    Extra Tube Hold for add-ons.    CBC Auto Differential    Collection Time: 10/16/24  4:17 AM    Specimen: Blood   Result Value Ref Range    WBC 8.36 3.40 - 10.80 10*3/mm3    RBC 4.21 3.77 - 5.28 10*6/mm3    Hemoglobin 12.2 12.0 - 15.9 g/dL    Hematocrit 37.6 34.0 - 46.6 %    MCV 89.3 79.0 - 97.0 fL    MCH 29.0 26.6 - 33.0 pg    MCHC 32.4 31.5 - 35.7 g/dL    RDW 14.0 12.3 - 15.4 %    RDW-SD 45.2 37.0 - 54.0 fl    MPV 10.1 6.0 - 12.0 fL    Platelets 357 140 - 450 10*3/mm3    Neutrophil % 49.6 42.7 - 76.0 %    Lymphocyte % 40.0 19.6 - 45.3 %    Monocyte % 7.5 5.0 - 12.0 %    Eosinophil % 1.8 0.3 - 6.2 %    Basophil % 0.6 0.0 - 1.5 %    Immature Grans % 0.5 0.0 - 0.5 %    Neutrophils, Absolute 4.15 1.70 - 7.00 10*3/mm3    Lymphocytes, Absolute 3.34 (H) 0.70 - 3.10 10*3/mm3    Monocytes, Absolute 0.63 0.10 - 0.90 10*3/mm3    Eosinophils, Absolute 0.15 0.00 - 0.40 10*3/mm3    Basophils, Absolute 0.05 0.00 - 0.20 10*3/mm3    Immature Grans, Absolute 0.04  "0.00 - 0.05 10*3/mm3    nRBC 0.0 0.0 - 0.2 /100 WBC   ECG 12 Lead ED Triage Standing Order; Chest Pain    Collection Time: 10/16/24  6:14 AM   Result Value Ref Range    QT Interval 394 ms    QTC Interval 487 ms   High Sensitivity Troponin T 2Hr    Collection Time: 10/16/24  6:18 AM    Specimen: Blood   Result Value Ref Range    HS Troponin T 8 <14 ng/L    Troponin T Delta       Note: In addition to lab results from this visit, the labs listed above may include labs taken at another facility or during a different encounter within the last 24 hours. Please correlate lab times with ED admission and discharge times for further clarification of the services performed during this visit.    XR Chest 1 View   Final Result   Impression:   No active disease.            Electronically Signed: Venancio Escobar MD     10/16/2024 4:19 AM EDT     Workstation ID: [id number]        Vitals:    10/16/24 0349   BP: 138/72   BP Location: Left arm   Patient Position: Sitting   Pulse: 94   Resp: 20   Temp: 98.5 °F (36.9 °C)   TempSrc: Oral   SpO2: 97%   Weight: 117 kg (257 lb)   Height: 160 cm (63\")     Medications   sodium chloride 0.9 % flush 10 mL (has no administration in time range)   aspirin chewable tablet 324 mg (324 mg Oral Given 10/16/24 0417)     ECG/EMG Results (last 24 hours)       Procedure Component Value Units Date/Time    ECG 12 Lead ED Triage Standing Order; Chest Pain [[id number]] Collected: 10/16/24 0359     Updated: 10/16/24 0359     QT Interval 380 ms      QTC Interval 469 ms     Narrative:      Test Reason : ED Triage Standing Order~  Blood Pressure :   */*   mmHG  Vent. Rate :  92 BPM     Atrial Rate :  92 BPM     P-R Int : 156 ms          QRS Dur :  86 ms      QT Int : 380 ms       P-R-T Axes :  27   8  -3 degrees     QTc Int : 469 ms    Normal sinus rhythm  Voltage criteria for left ventricular hypertrophy  Nonspecific ST and T wave abnormality  Prolonged QT  Abnormal ECG  When compared with ECG of 26-SEP-2024 " 06:19,  Nonspecific T wave abnormality, improved in Lateral leads    Referred By:            Confirmed By:     ECG 12 Lead ED Triage Standing Order; Chest Pain [[id number]] Collected: 10/16/24 0614     Updated: 10/16/24 0614     QT Interval 394 ms      QTC Interval 487 ms     Narrative:      Test Reason : ED Triage Standing Order~  Blood Pressure :   */*   mmHG  Vent. Rate :  92 BPM     Atrial Rate :  92 BPM     P-R Int : 144 ms          QRS Dur :  88 ms      QT Int : 394 ms       P-R-T Axes :  41  19  25 degrees     QTc Int : 487 ms    Normal sinus rhythm  Minimal voltage criteria for LVH, may be normal variant  T wave abnormality, consider anterior ischemia  Prolonged QT  Abnormal ECG  When compared with ECG of 16-OCT-2024 03:59, (Unconfirmed)  No significant change was found    Referred By: EDMD           Confirmed By:           ECG 12 Lead ED Triage Standing Order; Chest Pain   Preliminary Result   Test Reason : ED Triage Standing Order~   Blood Pressure :   */*   mmHG   Vent. Rate :  92 BPM     Atrial Rate :  92 BPM      P-R Int : 144 ms          QRS Dur :  88 ms       QT Int : 394 ms       P-R-T Axes :  41  19  25 degrees      QTc Int : 487 ms      Normal sinus rhythm   Minimal voltage criteria for LVH, may be normal variant   T wave abnormality, consider anterior ischemia   Prolonged QT   Abnormal ECG   When compared with ECG of 16-OCT-2024 03:59, (Unconfirmed)   No significant change was found      Referred By: EDMD           Confirmed By:       ECG 12 Lead ED Triage Standing Order; Chest Pain   Preliminary Result   Test Reason : ED Triage Standing Order~   Blood Pressure :   */*   mmHG   Vent. Rate :  92 BPM     Atrial Rate :  92 BPM      P-R Int : 156 ms          QRS Dur :  86 ms       QT Int : 380 ms       P-R-T Axes :  27   8  -3 degrees      QTc Int : 469 ms      Normal sinus rhythm   Voltage criteria for left ventricular hypertrophy   Nonspecific ST and T wave abnormality   Prolonged QT   Abnormal ECG    When compared with ECG of 26-SEP-2024 06:19,   Nonspecific T wave abnormality, improved in Lateral leads      Referred By:            Confirmed By:               No follow-up provider specified.       Medication List      No changes were made to your prescriptions during this visit.            Carl Boss MD  10/16/24 0680

## 2024-10-17 LAB
QT INTERVAL: 380 MS
QT INTERVAL: 394 MS
QTC INTERVAL: 469 MS
QTC INTERVAL: 487 MS

## 2024-10-19 ENCOUNTER — APPOINTMENT (OUTPATIENT)
Dept: CT IMAGING | Facility: HOSPITAL | Age: 44
End: 2024-10-19
Payer: MEDICARE

## 2024-10-19 ENCOUNTER — APPOINTMENT (OUTPATIENT)
Dept: GENERAL RADIOLOGY | Facility: HOSPITAL | Age: 44
End: 2024-10-19
Payer: MEDICARE

## 2024-10-19 ENCOUNTER — HOSPITAL ENCOUNTER (EMERGENCY)
Facility: HOSPITAL | Age: 44
Discharge: HOME | End: 2024-10-19
Attending: EMERGENCY MEDICINE
Payer: MEDICARE

## 2024-10-19 VITALS
HEART RATE: 81 BPM | HEIGHT: 63 IN | WEIGHT: 260 LBS | RESPIRATION RATE: 16 BRPM | SYSTOLIC BLOOD PRESSURE: 147 MMHG | OXYGEN SATURATION: 95 % | DIASTOLIC BLOOD PRESSURE: 105 MMHG | TEMPERATURE: 99.2 F | BODY MASS INDEX: 46.07 KG/M2

## 2024-10-19 DIAGNOSIS — Z86.79 HISTORY OF HYPERTENSION: ICD-10-CM

## 2024-10-19 DIAGNOSIS — R07.9 CHEST PAIN, UNSPECIFIED TYPE: Primary | ICD-10-CM

## 2024-10-19 DIAGNOSIS — R42 DIZZINESS: ICD-10-CM

## 2024-10-19 DIAGNOSIS — R06.02 SOB (SHORTNESS OF BREATH): ICD-10-CM

## 2024-10-19 LAB
ALBUMIN SERPL-MCNC: 4 G/DL (ref 3.5–5.2)
ALBUMIN/GLOB SERPL: 2.1 G/DL
ALP SERPL-CCNC: 148 U/L (ref 39–117)
ALT SERPL W P-5'-P-CCNC: 37 U/L (ref 1–33)
ANION GAP SERPL CALCULATED.3IONS-SCNC: 14 MMOL/L (ref 5–15)
AST SERPL-CCNC: 37 U/L (ref 1–32)
BASOPHILS # BLD AUTO: 0.06 10*3/MM3 (ref 0–0.2)
BASOPHILS NFR BLD AUTO: 0.8 % (ref 0–1.5)
BILIRUB SERPL-MCNC: 0.2 MG/DL (ref 0–1.2)
BUN SERPL-MCNC: 10 MG/DL (ref 6–20)
BUN/CREAT SERPL: 19.2 (ref 7–25)
CALCIUM SPEC-SCNC: 9.7 MG/DL (ref 8.6–10.5)
CHLORIDE SERPL-SCNC: 105 MMOL/L (ref 98–107)
CO2 SERPL-SCNC: 21 MMOL/L (ref 22–29)
CREAT SERPL-MCNC: 0.52 MG/DL (ref 0.57–1)
DEPRECATED RDW RBC AUTO: 45.6 FL (ref 37–54)
EGFRCR SERPLBLD CKD-EPI 2021: 116.2 ML/MIN/1.73
EOSINOPHIL # BLD AUTO: 0.14 10*3/MM3 (ref 0–0.4)
EOSINOPHIL NFR BLD AUTO: 1.9 % (ref 0.3–6.2)
ERYTHROCYTE [DISTWIDTH] IN BLOOD BY AUTOMATED COUNT: 14 % (ref 12.3–15.4)
GEN 5 2HR TROPONIN T REFLEX: <6 NG/L
GLOBULIN UR ELPH-MCNC: 1.9 GM/DL
GLUCOSE SERPL-MCNC: 104 MG/DL (ref 65–99)
HCT VFR BLD AUTO: 40.6 % (ref 34–46.6)
HGB BLD-MCNC: 13.2 G/DL (ref 12–15.9)
HOLD SPECIMEN: NORMAL
IMM GRANULOCYTES # BLD AUTO: 0.02 10*3/MM3 (ref 0–0.05)
IMM GRANULOCYTES NFR BLD AUTO: 0.3 % (ref 0–0.5)
LIPASE SERPL-CCNC: 36 U/L (ref 13–60)
LYMPHOCYTES # BLD AUTO: 3.05 10*3/MM3 (ref 0.7–3.1)
LYMPHOCYTES NFR BLD AUTO: 42.5 % (ref 19.6–45.3)
MCH RBC QN AUTO: 29.1 PG (ref 26.6–33)
MCHC RBC AUTO-ENTMCNC: 32.5 G/DL (ref 31.5–35.7)
MCV RBC AUTO: 89.6 FL (ref 79–97)
MONOCYTES # BLD AUTO: 0.41 10*3/MM3 (ref 0.1–0.9)
MONOCYTES NFR BLD AUTO: 5.7 % (ref 5–12)
NEUTROPHILS NFR BLD AUTO: 3.5 10*3/MM3 (ref 1.7–7)
NEUTROPHILS NFR BLD AUTO: 48.8 % (ref 42.7–76)
NRBC BLD AUTO-RTO: 0 /100 WBC (ref 0–0.2)
NT-PROBNP SERPL-MCNC: <36 PG/ML (ref 0–450)
PLATELET # BLD AUTO: 373 10*3/MM3 (ref 140–450)
PMV BLD AUTO: 9.7 FL (ref 6–12)
POTASSIUM SERPL-SCNC: 4 MMOL/L (ref 3.5–5.2)
PROT SERPL-MCNC: 5.9 G/DL (ref 6–8.5)
QT INTERVAL: 360 MS
QT INTERVAL: 390 MS
QTC INTERVAL: 459 MS
QTC INTERVAL: 471 MS
RBC # BLD AUTO: 4.53 10*6/MM3 (ref 3.77–5.28)
SODIUM SERPL-SCNC: 140 MMOL/L (ref 136–145)
TROPONIN T DELTA: NORMAL
TROPONIN T SERPL HS-MCNC: <6 NG/L
WBC NRBC COR # BLD AUTO: 7.18 10*3/MM3 (ref 3.4–10.8)
WHOLE BLOOD HOLD COAG: NORMAL
WHOLE BLOOD HOLD SPECIMEN: NORMAL

## 2024-10-19 PROCEDURE — 84484 ASSAY OF TROPONIN QUANT: CPT | Performed by: EMERGENCY MEDICINE

## 2024-10-19 PROCEDURE — 83690 ASSAY OF LIPASE: CPT | Performed by: EMERGENCY MEDICINE

## 2024-10-19 PROCEDURE — 71045 X-RAY EXAM CHEST 1 VIEW: CPT

## 2024-10-19 PROCEDURE — 99285 EMERGENCY DEPT VISIT HI MDM: CPT

## 2024-10-19 PROCEDURE — 63710000001 ONDANSETRON ODT 4 MG TABLET DISPERSIBLE: Performed by: PHYSICIAN ASSISTANT

## 2024-10-19 PROCEDURE — 71275 CT ANGIOGRAPHY CHEST: CPT

## 2024-10-19 PROCEDURE — 93005 ELECTROCARDIOGRAM TRACING: CPT | Performed by: EMERGENCY MEDICINE

## 2024-10-19 PROCEDURE — 25510000001 IOPAMIDOL PER 1 ML: Performed by: EMERGENCY MEDICINE

## 2024-10-19 PROCEDURE — 83880 ASSAY OF NATRIURETIC PEPTIDE: CPT | Performed by: EMERGENCY MEDICINE

## 2024-10-19 PROCEDURE — 80053 COMPREHEN METABOLIC PANEL: CPT | Performed by: EMERGENCY MEDICINE

## 2024-10-19 PROCEDURE — 85025 COMPLETE CBC W/AUTO DIFF WBC: CPT | Performed by: EMERGENCY MEDICINE

## 2024-10-19 PROCEDURE — 36415 COLL VENOUS BLD VENIPUNCTURE: CPT

## 2024-10-19 RX ORDER — HYDROXYZINE HYDROCHLORIDE 25 MG/1
25 TABLET, FILM COATED ORAL 3 TIMES DAILY PRN
Qty: 24 TABLET | Refills: 0 | Status: SHIPPED | OUTPATIENT
Start: 2024-10-19

## 2024-10-19 RX ORDER — ONDANSETRON 4 MG/1
4 TABLET, ORALLY DISINTEGRATING ORAL ONCE
Status: COMPLETED | OUTPATIENT
Start: 2024-10-19 | End: 2024-10-19

## 2024-10-19 RX ORDER — FAMOTIDINE 20 MG/1
20 TABLET, FILM COATED ORAL ONCE
Status: COMPLETED | OUTPATIENT
Start: 2024-10-19 | End: 2024-10-19

## 2024-10-19 RX ORDER — SODIUM CHLORIDE 0.9 % (FLUSH) 0.9 %
10 SYRINGE (ML) INJECTION AS NEEDED
Status: DISCONTINUED | OUTPATIENT
Start: 2024-10-19 | End: 2024-10-19 | Stop reason: HOSPADM

## 2024-10-19 RX ORDER — ALUMINA, MAGNESIA, AND SIMETHICONE 2400; 2400; 240 MG/30ML; MG/30ML; MG/30ML
30 SUSPENSION ORAL ONCE
Status: COMPLETED | OUTPATIENT
Start: 2024-10-19 | End: 2024-10-19

## 2024-10-19 RX ORDER — IOPAMIDOL 755 MG/ML
100 INJECTION, SOLUTION INTRAVASCULAR
Status: COMPLETED | OUTPATIENT
Start: 2024-10-19 | End: 2024-10-19

## 2024-10-19 RX ORDER — HYDROXYZINE HYDROCHLORIDE 25 MG/1
25 TABLET, FILM COATED ORAL ONCE
Status: COMPLETED | OUTPATIENT
Start: 2024-10-19 | End: 2024-10-19

## 2024-10-19 RX ORDER — ASPIRIN 81 MG/1
324 TABLET, CHEWABLE ORAL ONCE
Status: COMPLETED | OUTPATIENT
Start: 2024-10-19 | End: 2024-10-19

## 2024-10-19 RX ADMIN — FAMOTIDINE 20 MG: 20 TABLET, FILM COATED ORAL at 08:00

## 2024-10-19 RX ADMIN — ALUMINUM HYDROXIDE, MAGNESIUM HYDROXIDE, DIMETHICONE 30 ML: 400; 400; 40 SUSPENSION ORAL at 08:01

## 2024-10-19 RX ADMIN — HYDROXYZINE HYDROCHLORIDE 25 MG: 25 TABLET ORAL at 10:18

## 2024-10-19 RX ADMIN — IOPAMIDOL 85 ML: 755 INJECTION, SOLUTION INTRAVENOUS at 08:18

## 2024-10-19 RX ADMIN — ONDANSETRON 4 MG: 4 TABLET, ORALLY DISINTEGRATING ORAL at 08:00

## 2024-10-19 RX ADMIN — ASPIRIN 81 MG 324 MG: 81 TABLET ORAL at 07:56

## 2024-10-19 NOTE — ED PROVIDER NOTES
Subjective   History of Present Illness  Pt is a 43 yo female presenting to ED with complaints of chest pain. PMHx includes HTN, HLD, BENJAMIN, Hypothyroidism, Depression and Obesity. Pt reports waking up around 2am this morning with diffuse chest pain that is worse on the right. She reports the pain radiates into both of her shoulder blades but also worse on the right. She complains of SOB, dizziness and nausea. She took Tylenol PTA without relief. She denies syncope, cough, fever, V/D, abdominal pain or new leg swelling. She denies prior cardiac stents and had normal heart cath and ECHO in 2023. Followed by Dr. Barclay. She came to ED 10-16-24 for similar CP and discharged home. She admits being anxious but denies new or different than baseline. She does not take regular antiacids. Denies tobacco, drug or ETOH use.     History provided by:  Patient and medical records      Review of Systems   Constitutional:  Negative for chills and fever.   HENT:  Negative for congestion and trouble swallowing.    Eyes:  Negative for visual disturbance.   Respiratory:  Positive for shortness of breath. Negative for cough.    Cardiovascular:  Positive for chest pain. Negative for leg swelling.   Gastrointestinal:  Positive for nausea. Negative for abdominal pain, diarrhea and vomiting.   Genitourinary:  Negative for difficulty urinating, dysuria, flank pain and hematuria.   Musculoskeletal:  Positive for back pain. Negative for arthralgias.   Skin:  Negative for rash and wound.   Neurological:  Positive for dizziness. Negative for syncope, speech difficulty, weakness, numbness and headaches.   Psychiatric/Behavioral:  Negative for confusion.    All other systems reviewed and are negative.      Past Medical History:   Diagnosis Date    CHF (congestive heart failure)     Depression     Elevated cholesterol     Hyperlipemia     Hypothyroid     Sleep apnea        Allergies   Allergen Reactions    Wellbutrin [Bupropion] Hives       Past  Surgical History:   Procedure Laterality Date    BRAIN SURGERY      CARDIAC CATHETERIZATION N/A 8/10/2023    Procedure: Left Heart Cath;  Surgeon: Yury Barclay MD;  Location: [facility] CATH INVASIVE LOCATION;  Service: Cardiology;  Laterality: N/A;    DILATION AND CURETTAGE, DIAGNOSTIC / THERAPEUTIC         Family History   Adopted: Yes   Problem Relation Age of Onset    No Known Problems Mother     No Known Problems Father     No Known Problems Maternal Grandmother     No Known Problems Maternal Grandfather     No Known Problems Paternal Grandmother     No Known Problems Paternal Grandfather        Social History     Socioeconomic History    Marital status:    Tobacco Use    Smoking status: Never     Passive exposure: Past    Smokeless tobacco: Never   Vaping Use    Vaping status: Never Used   Substance and Sexual Activity    Alcohol use: No    Drug use: No    Sexual activity: Yes     Partners: Male     Birth control/protection: Surgical           Objective   Physical Exam  Vitals and nursing note reviewed.   Constitutional:       General: She is not in acute distress.     Appearance: She is well-developed. She is obese.   HENT:      Head: Atraumatic.      Nose: Nose normal.   Eyes:      General: Lids are normal.      Conjunctiva/sclera: Conjunctivae normal.      Pupils: Pupils are equal, round, and reactive to light.   Cardiovascular:      Rate and Rhythm: Normal rate and regular rhythm.      Heart sounds: Normal heart sounds.   Pulmonary:      Effort: Pulmonary effort is normal.      Breath sounds: Normal breath sounds. No wheezing.   Abdominal:      General: There is no distension.      Palpations: Abdomen is soft.      Tenderness: There is no abdominal tenderness. There is no guarding or rebound.   Musculoskeletal:         General: No tenderness. Normal range of motion.      Cervical back: Normal range of motion and neck supple.   Skin:     General: Skin is warm and dry.      Findings: No erythema or rash.    Neurological:      Mental Status: She is alert and oriented to person, place, and time.      Sensory: No sensory deficit.   Psychiatric:         Speech: Speech normal.         Behavior: Behavior normal.         Procedures           ED Course  ED Course as of 10/19/24 1610   Sat Oct 19, 2024   0744 Reviewed old records  8/2023  FINAL IMPRESSION:  · Angiographically normal coronary arteries.  · Normal left ventricular systolic function, estimated EF 65%.   [RT]   0744 ECHO 8/2023  Interpretation Summary  ·  Left ventricular systolic function is normal. Estimated left ventricular EF = 52%  ·  The cardiac valves are anatomically and functionally normal.   [RT]   0840 I personally reviewed and interpreted labs results and went over with patient. I personally reviewed and interpreted vitals. [RT]   0840 HS Troponin T: <6 [RT]   0840 proBNP: <36.0 [RT]   0840 WBC: 7.18 [RT]   0840 Glucose(!): 104 [RT]   0840 Potassium: 4.0 [RT]   0840 ALT (SGPT)(!): 37 [RT]   0840 AST (SGOT)(!): 37 [RT]   0840 Alkaline Phosphatase(!): 148  Noted prior mildly elevated LFTs [RT]   0841 I personally and independently reviewed CXR and CTA chest and agree with the radiology interpretation specifically no obvious PE, pneumonia, fluid overload or pneumothorax.  [RT]   0946 Patient resting and feeling better but still having tingling in bilateral hands. Ordered Atarax.  [RT]   1008 HS Troponin T: <6 [RT]      ED Course User Index  [RT] Torri Green, PA      Recent Results (from the past 24 hours)   ECG 12 Lead ED Triage Standing Order; Chest Pain    Collection Time: 10/19/24  7:34 AM   Result Value Ref Range    QT Interval 360 ms    QTC Interval 459 ms   High Sensitivity Troponin T    Collection Time: 10/19/24  7:55 AM    Specimen: Blood   Result Value Ref Range    HS Troponin T <6 <14 ng/L   Comprehensive Metabolic Panel    Collection Time: 10/19/24  7:55 AM    Specimen: Blood   Result Value Ref Range    Glucose 104 (H) 65 - 99 mg/dL    BUN  10 6 - 20 mg/dL    Creatinine 0.52 (L) 0.57 - 1.00 mg/dL    Sodium 140 136 - 145 mmol/L    Potassium 4.0 3.5 - 5.2 mmol/L    Chloride 105 98 - 107 mmol/L    CO2 21.0 (L) 22.0 - 29.0 mmol/L    Calcium 9.7 8.6 - 10.5 mg/dL    Total Protein 5.9 (L) 6.0 - 8.5 g/dL    Albumin 4.0 3.5 - 5.2 g/dL    ALT (SGPT) 37 (H) 1 - 33 U/L    AST (SGOT) 37 (H) 1 - 32 U/L    Alkaline Phosphatase 148 (H) 39 - 117 U/L    Total Bilirubin 0.2 0.0 - 1.2 mg/dL    Globulin 1.9 gm/dL    A/G Ratio 2.1 g/dL    BUN/Creatinine Ratio 19.2 7.0 - 25.0    Anion Gap 14.0 5.0 - 15.0 mmol/L    eGFR 116.2 >60.0 mL/min/1.73   Lipase    Collection Time: 10/19/24  7:55 AM    Specimen: Blood   Result Value Ref Range    Lipase 36 13 - 60 U/L   BNP    Collection Time: 10/19/24  7:55 AM    Specimen: Blood   Result Value Ref Range    proBNP <36.0 0.0 - 450.0 pg/mL   Green Top (Gel)    Collection Time: 10/19/24  7:55 AM   Result Value Ref Range    Extra Tube Hold for add-ons.    Lavender Top    Collection Time: 10/19/24  7:55 AM   Result Value Ref Range    Extra Tube hold for add-on    Gold Top - SST    Collection Time: 10/19/24  7:55 AM   Result Value Ref Range    Extra Tube Hold for add-ons.    Gray Top    Collection Time: 10/19/24  7:55 AM   Result Value Ref Range    Extra Tube Hold for add-ons.    Light Blue Top    Collection Time: 10/19/24  7:55 AM   Result Value Ref Range    Extra Tube Hold for add-ons.    CBC Auto Differential    Collection Time: 10/19/24  7:55 AM    Specimen: Blood   Result Value Ref Range    WBC 7.18 3.40 - 10.80 10*3/mm3    RBC 4.53 3.77 - 5.28 10*6/mm3    Hemoglobin 13.2 12.0 - 15.9 g/dL    Hematocrit 40.6 34.0 - 46.6 %    MCV 89.6 79.0 - 97.0 fL    MCH 29.1 26.6 - 33.0 pg    MCHC 32.5 31.5 - 35.7 g/dL    RDW 14.0 12.3 - 15.4 %    RDW-SD 45.6 37.0 - 54.0 fl    MPV 9.7 6.0 - 12.0 fL    Platelets 373 140 - 450 10*3/mm3    Neutrophil % 48.8 42.7 - 76.0 %    Lymphocyte % 42.5 19.6 - 45.3 %    Monocyte % 5.7 5.0 - 12.0 %    Eosinophil % 1.9  0.3 - 6.2 %    Basophil % 0.8 0.0 - 1.5 %    Immature Grans % 0.3 0.0 - 0.5 %    Neutrophils, Absolute 3.50 1.70 - 7.00 10*3/mm3    Lymphocytes, Absolute 3.05 0.70 - 3.10 10*3/mm3    Monocytes, Absolute 0.41 0.10 - 0.90 10*3/mm3    Eosinophils, Absolute 0.14 0.00 - 0.40 10*3/mm3    Basophils, Absolute 0.06 0.00 - 0.20 10*3/mm3    Immature Grans, Absolute 0.02 0.00 - 0.05 10*3/mm3    nRBC 0.0 0.0 - 0.2 /100 WBC   ECG 12 Lead ED Triage Standing Order; Chest Pain    Collection Time: 10/19/24  9:22 AM   Result Value Ref Range    QT Interval 390 ms    QTC Interval 471 ms   High Sensitivity Troponin T 2Hr    Collection Time: 10/19/24  9:39 AM    Specimen: Blood   Result Value Ref Range    HS Troponin T <6 <14 ng/L    Troponin T Delta       Note: In addition to lab results from this visit, the labs listed above may include labs taken at another facility or during a different encounter within the last 24 hours. Please correlate lab times with ED admission and discharge times for further clarification of the services performed during this visit.    CT Angiogram Chest   Final Result   Limited opacification pulmonary arteries. No large filling defects are seen. Small subsegmental filling defects can be missed on this exam.   Stable mild groundglass opacity periphery of the lungs. Nonspecific.         Electronically Signed: Sandy Adams MD     10/19/2024 8:26 AM EDT     Workstation ID: [id number]      XR Chest 1 View   Final Result   Impression:   1. No acute cardiopulmonary abnormality.         Electronically Signed: Tristen Wray     10/19/2024 7:58 AM EDT     Workstation ID: [id number]        Vitals:    10/19/24 0830 10/19/24 0900 10/19/24 0930 10/19/24 1000   BP:       BP Location:       Patient Position:       Pulse: 91 80 81 81   Resp:       Temp:       TempSrc:       SpO2: 93% 94% 95% 95%   Weight:       Height:         Medications   aspirin chewable tablet 324 mg (324 mg Oral Given 10/19/24 0756)   famotidine (PEPCID)  tablet 20 mg (20 mg Oral Given 10/19/24 0800)   aluminum-magnesium hydroxide-simethicone (MAALOX MAX) 400-400-40 MG/5ML suspension 30 mL (30 mL Oral Given 10/19/24 0801)   ondansetron ODT (ZOFRAN-ODT) disintegrating tablet 4 mg (4 mg Translingual Given 10/19/24 0800)   iopamidol (ISOVUE-370) 76 % injection 100 mL (85 mL Intravenous Given 10/19/24 0818)   hydrOXYzine (ATARAX) tablet 25 mg (25 mg Oral Given 10/19/24 1018)     ECG/EMG Results (last 24 hours)       Procedure Component Value Units Date/Time    ECG 12 Lead ED Triage Standing Order; Chest Pain [[id number]] Collected: 10/19/24 0734     Updated: 10/19/24 0734     QT Interval 360 ms      QTC Interval 459 ms     Narrative:      Test Reason : ED Triage Standing Order~  Blood Pressure :   */*   mmHG  Vent. Rate :  98 BPM     Atrial Rate :  98 BPM     P-R Int : 136 ms          QRS Dur :  96 ms      QT Int : 360 ms       P-R-T Axes :  35  -4 -16 degrees     QTc Int : 459 ms    Normal sinus rhythm  Voltage criteria for left ventricular hypertrophy  T wave abnormality, consider anterolateral ischemia  Abnormal ECG  When compared with ECG of 16-OCT-2024 06:14,  Nonspecific T wave abnormality, worse in Inferior leads  Inverted T waves have replaced nonspecific T wave abnormality in Lateral leads    Referred By:            Confirmed By:           ECG 12 Lead ED Triage Standing Order; Chest Pain   Preliminary Result   Test Reason : ED Triage Standing Order~   Blood Pressure :   */*   mmHG   Vent. Rate :  88 BPM     Atrial Rate :  88 BPM      P-R Int : 146 ms          QRS Dur :  92 ms       QT Int : 390 ms       P-R-T Axes :  13  -3   7 degrees      QTc Int : 471 ms      Normal sinus rhythm   Voltage criteria for left ventricular hypertrophy   T wave abnormality, consider anterior ischemia   Prolonged QT   Abnormal ECG   When compared with ECG of 19-OCT-2024 07:34, (Unconfirmed)   Nonspecific T wave abnormality has replaced inverted T waves in Lateral    leads       Referred By: EDMD           Confirmed By:       ECG 12 Lead ED Triage Standing Order; Chest Pain   Preliminary Result   Test Reason : ED Triage Standing Order~   Blood Pressure :   */*   mmHG   Vent. Rate :  98 BPM     Atrial Rate :  98 BPM      P-R Int : 136 ms          QRS Dur :  96 ms       QT Int : 360 ms       P-R-T Axes :  35  -4 -16 degrees      QTc Int : 459 ms      Normal sinus rhythm   Voltage criteria for left ventricular hypertrophy   T wave abnormality, consider anterolateral ischemia   Abnormal ECG   When compared with ECG of 16-OCT-2024 06:14,   Nonspecific T wave abnormality, worse in Inferior leads   Inverted T waves have replaced nonspecific T wave abnormality in Lateral    leads      Referred By:            Confirmed By:                       HEART Score: 3                              Medical Decision Making  Pt is a 45 yo female presenting to ED with complaints of CP, SOB, dizziness and nausea with upper back pain. I had a discussion with the patient / family regarding ED course, diagnosis, diagnostic results and treatment plan including medications and admission / discharge. Discussed if new or worse symptoms / concerns to return to ED for further evaluation. Discussed need for close follow up with PCP / specialists.         DDx: ACS, Pneumonia, Pneumothorax, Pleurisy, Pericarditis, PE, Anxiety, Reflux, Arrhythmia         Problems Addressed:  Chest pain, unspecified type: complicated acute illness or injury  Dizziness: complicated acute illness or injury  History of hypertension: complicated acute illness or injury  SOB (shortness of breath): complicated acute illness or injury    Amount and/or Complexity of Data Reviewed  Independent Historian: spouse  External Data Reviewed: notes.     Details: Reviewed previous non ED visits including prior labs, imaging, available notes, medications, allergies and surgical hx.     Labs: ordered. Decision-making details documented in ED Course.  Radiology:  ordered. Decision-making details documented in ED Course.  ECG/medicine tests: ordered. Decision-making details documented in ED Course.    Risk  OTC drugs.  Prescription drug management.        Final diagnoses:   Chest pain, unspecified type   SOB (shortness of breath)   Dizziness   History of hypertension       ED Disposition  ED Disposition       ED Disposition   Discharge    Condition   Stable    Comment   --               Yeison Garcia,   [address]  [address]  [place] [address] [phone]    Schedule an appointment as soon as possible for a visit            Medication List        New Prescriptions      hydrOXYzine 25 MG tablet  Commonly known as: ATARAX  Take 1 tablet by mouth 3 (Three) Times a Day As Needed for Anxiety for up to 24 doses.               Where to Get Your Medications        These medications were sent to [facility] [id number] - [place], [place] - [address] [facility]  [phone] [facility] [phone] FX  [address] [place] [address]      Phone: [phone]   hydrOXYzine 25 MG tablet            Torri Green PA  10/19/24 0375

## 2024-10-23 LAB
QT INTERVAL: 360 MS
QT INTERVAL: 390 MS
QTC INTERVAL: 459 MS
QTC INTERVAL: 471 MS

## 2024-11-01 ENCOUNTER — OFFICE VISIT (OUTPATIENT)
Dept: CARDIOLOGY | Facility: CLINIC | Age: 44
End: 2024-11-01
Payer: MEDICARE

## 2024-11-01 VITALS
HEIGHT: 63 IN | DIASTOLIC BLOOD PRESSURE: 78 MMHG | OXYGEN SATURATION: 97 % | SYSTOLIC BLOOD PRESSURE: 112 MMHG | HEART RATE: 114 BPM | WEIGHT: 256 LBS | BODY MASS INDEX: 45.36 KG/M2

## 2024-11-01 DIAGNOSIS — E78.5 DYSLIPIDEMIA: ICD-10-CM

## 2024-11-01 DIAGNOSIS — I10 ESSENTIAL HYPERTENSION: ICD-10-CM

## 2024-11-01 DIAGNOSIS — R07.89 OTHER CHEST PAIN: Primary | ICD-10-CM

## 2024-11-01 PROCEDURE — 1159F MED LIST DOCD IN RCRD: CPT | Performed by: INTERNAL MEDICINE

## 2024-11-01 PROCEDURE — 3078F DIAST BP <80 MM HG: CPT | Performed by: INTERNAL MEDICINE

## 2024-11-01 PROCEDURE — 3074F SYST BP LT 130 MM HG: CPT | Performed by: INTERNAL MEDICINE

## 2024-11-01 PROCEDURE — 1160F RVW MEDS BY RX/DR IN RCRD: CPT | Performed by: INTERNAL MEDICINE

## 2024-11-01 PROCEDURE — 99214 OFFICE O/P EST MOD 30 MIN: CPT | Performed by: INTERNAL MEDICINE

## 2024-11-01 RX ORDER — ROSUVASTATIN CALCIUM 20 MG/1
20 TABLET, COATED ORAL DAILY
Qty: 90 TABLET | Refills: 3 | Status: SHIPPED | OUTPATIENT
Start: 2024-11-01 | End: 2024-11-01 | Stop reason: SDUPTHER

## 2024-11-01 RX ORDER — ROSUVASTATIN CALCIUM 10 MG/1
10 TABLET, COATED ORAL DAILY
Qty: 90 TABLET | Refills: 3 | Status: SHIPPED | OUTPATIENT
Start: 2024-11-01

## 2024-11-01 NOTE — PROGRESS NOTES
[facility] Cardiology    Encounter Date: 2024    Patient ID: Delmi Glasgow is a 44 y.o. female.  : 1980     PCP: Yeison Garcia DO       Chief Complaint: Chest pain, unspecified type      PROBLEM LIST:  Chest pain  Stress PET 2020: normal myocardial perfusion. Rest EF 54%, stress EF 59%.  Stress with myocardial perfusion (2023): normal nuclear stress test.  LVEF: 45 - 49%.   Echo, 08/10/2023: EF 52%. Normal.  LHC, 08/10/2023: EF 65%. Angiographically normal coronary arteries.   Echo 3/21/2024, EF 46%. Trace TR/KY.  HFrEF with subsequent improvement of LV function  Hypertension  Hyperlipidemia  History of palpitations  72 hour holter (2020): normal   Hypothyroidism  Sleep apnea  Morbid obesity, BMI 46  Cholelithiasis noted on CT chest  History of surgical resection of pituitary macroadenoma    History of Present Illness  Patient presents today for a follow-up with a history of chest pain and cardiac risk factors. Since last visit, she has done well from cardiac standpoint.  Remains active with minor household chores and walking and taking care of her family.  No current complaints of chest pain shortness of breath edema palpitations dizziness or syncope.    Allergies   Allergen Reactions    Wellbutrin [Bupropion] Hives         Current Outpatient Medications:     ARIPiprazole (ABILIFY) 5 MG tablet, Take 1 tablet by mouth Every Night., Disp: , Rfl:     butalbital-acetaminophen-caffeine (FIORICET, ESGIC) -40 MG per tablet, Take 1 tablet by mouth Every 6 (Six) Hours As Needed for Headache or Migraine., Disp: 12 tablet, Rfl: 0    cefuroxime (CEFTIN) 500 MG tablet, Take 1 tablet by mouth 2 (Two) Times a Day., Disp: 14 tablet, Rfl: 0    desvenlafaxine (PRISTIQ) 50 MG 24 hr tablet, Take 1 tablet by mouth Every Night., Disp: , Rfl:     fenofibrate (TRICOR) 145 MG tablet, Take 1 tablet by mouth Daily., Disp: , Rfl:     hydrOXYzine (ATARAX) 25 MG tablet, Take 1 tablet  "by mouth 3 (Three) Times a Day As Needed for Anxiety for up to 24 doses., Disp: 24 tablet, Rfl: 0    levothyroxine (SYNTHROID, LEVOTHROID) 112 MCG tablet, Take 1 tablet by mouth Daily., Disp: , Rfl:     lisinopril (PRINIVIL,ZESTRIL) 2.5 MG tablet, Take 1 tablet by mouth Daily., Disp: , Rfl:     nitrofurantoin, macrocrystal-monohydrate, (MACROBID) 100 MG capsule, Take 1 capsule by mouth 2 (Two) Times a Day., Disp: 14 capsule, Rfl: 0    ondansetron ODT (ZOFRAN-ODT) 4 MG disintegrating tablet, Place 1 tablet on the tongue As Needed for Nausea or Vomiting., Disp: , Rfl:     pantoprazole (PROTONIX) 20 MG EC tablet, Take 1 tablet by mouth., Disp: , Rfl:     rosuvastatin (CRESTOR) 10 MG tablet, Take 1 tablet by mouth Daily., Disp: 90 tablet, Rfl: 3    The following portions of the patient's history were reviewed and updated as appropriate: allergies, current medications, past family history, past medical history, past social history, past surgical history and problem list.    ROS  Review of Systems   14 point ROS negative except for that listed in the HPI.         Objective:     /78   Pulse 114   Ht 160 cm (63\")   Wt 116 kg (256 lb)   LMP  (LMP Unknown)   SpO2 97%   BMI 45.35 kg/m²      Physical Exam  Constitutional: Patient appears well-developed and well-nourished.   HENT: HEENT exam unremarkable.   Neck: Neck supple. No JVD present. No carotid bruits.   Cardiovascular: Normal rate, regular rhythm and normal heart sounds. No murmur heard.   2+ symmetric pulses.   Pulmonary/Chest: Breath sounds normal. Does not exhibit tenderness.   Abdominal: Abdomen benign.   Musculoskeletal: Does not exhibit edema.   Neurological: Neurological exam unremarkable.   Vitals reviewed.    Data Review:   Lab Results   Component Value Date    GLUCOSE 104 (H) 10/19/2024    BUN 10 10/19/2024    CREATININE 0.52 (L) 10/19/2024    EGFR 116.2 10/19/2024    BCR 19.2 10/19/2024     10/19/2024    K 4.0 10/19/2024    CO2 21.0 (L) " 10/19/2024    CALCIUM 9.7 10/19/2024    ALBUMIN 4.0 10/19/2024    AST 37 (H) 10/19/2024    ALT 37 (H) 10/19/2024      Lab Results   Component Value Date    WBC 7.18 10/19/2024    RBC 4.53 10/19/2024    HGB 13.2 10/19/2024    HCT 40.6 10/19/2024    MCV 89.6 10/19/2024     10/19/2024     Lab Results   Component Value Date    TSH <0.005 (L) 09/18/2024     Lab Results   Component Value Date    HGBA1C 5.2 03/20/2024        Procedures       Advance Care Planning   ACP discussion was declined by the patient. Patient does not have an advance directive, declines further assistance.           Assessment:      Diagnosis Plan   1. Other chest pain  Negative previous cardiac workup, currently stable/inactive, continue risk factor management.      2. Essential hypertension  Well-controlled, continue lisinopril.      3. Dyslipidemia  Due to cardiac risk factors and history of chest pain I have recommended initiation of statin therapy with Crestor 10 mg daily.  She will need repeat labs include lipid panel and CMP with PCP in 2 to 3 months.         Plan:   Stable cardiac status.  No angina or CHF symptoms.  Add Crestor 10 mg daily for management of dyslipidemia, follow-up with PCP assess labs including lipid panel and CMP.  Continue all other current medications.   FU in 12 MO, sooner as needed.  Thank you for allowing us to participate in the care of your patient.       Yury Barclay MD, FACC, [facility]        Please note that portions of this note may have been completed with a voice recognition program. Efforts were made to edit the dictations, but occasionally words are mistranscribed.

## 2024-12-23 RX ORDER — LISINOPRIL 2.5 MG/1
2.5 TABLET ORAL DAILY
Qty: 30 TABLET | Refills: 6 | Status: SHIPPED | OUTPATIENT
Start: 2024-12-23

## 2025-01-07 ENCOUNTER — HOSPITAL ENCOUNTER (EMERGENCY)
Facility: HOSPITAL | Age: 45
Discharge: HOME | End: 2025-01-08
Attending: EMERGENCY MEDICINE | Admitting: EMERGENCY MEDICINE
Payer: MEDICARE

## 2025-01-07 ENCOUNTER — APPOINTMENT (OUTPATIENT)
Dept: GENERAL RADIOLOGY | Facility: HOSPITAL | Age: 45
End: 2025-01-07
Payer: MEDICARE

## 2025-01-07 ENCOUNTER — APPOINTMENT (OUTPATIENT)
Dept: MRI IMAGING | Facility: HOSPITAL | Age: 45
End: 2025-01-07
Payer: MEDICARE

## 2025-01-07 VITALS
BODY MASS INDEX: 44.3 KG/M2 | TEMPERATURE: 98.7 F | RESPIRATION RATE: 20 BRPM | OXYGEN SATURATION: 99 % | DIASTOLIC BLOOD PRESSURE: 85 MMHG | WEIGHT: 250 LBS | SYSTOLIC BLOOD PRESSURE: 119 MMHG | HEIGHT: 63 IN | HEART RATE: 96 BPM

## 2025-01-07 DIAGNOSIS — Z86.69 HISTORY OF LABYRINTHITIS: ICD-10-CM

## 2025-01-07 DIAGNOSIS — R42 DIZZINESS: ICD-10-CM

## 2025-01-07 DIAGNOSIS — R51.9 RIGHT-SIDED HEADACHE: Primary | ICD-10-CM

## 2025-01-07 DIAGNOSIS — R11.2 NAUSEA AND VOMITING, UNSPECIFIED VOMITING TYPE: ICD-10-CM

## 2025-01-07 DIAGNOSIS — Z86.018 HISTORY OF PITUITARY ADENOMA: ICD-10-CM

## 2025-01-07 DIAGNOSIS — R42 VERTIGO: ICD-10-CM

## 2025-01-07 DIAGNOSIS — R26.89 IMBALANCE: ICD-10-CM

## 2025-01-07 DIAGNOSIS — R09.81 NASAL CONGESTION: ICD-10-CM

## 2025-01-07 DIAGNOSIS — Z86.79 HISTORY OF HYPERTENSION: ICD-10-CM

## 2025-01-07 DIAGNOSIS — H93.11 TINNITUS OF RIGHT EAR: ICD-10-CM

## 2025-01-07 LAB
ALBUMIN SERPL-MCNC: 4.1 G/DL (ref 3.5–5.2)
ALBUMIN/GLOB SERPL: 1.6 G/DL
ALP SERPL-CCNC: 158 U/L (ref 39–117)
ALT SERPL W P-5'-P-CCNC: 29 U/L (ref 1–33)
AMPHET+METHAMPHET UR QL: NEGATIVE
AMPHETAMINES UR QL: NEGATIVE
ANION GAP SERPL CALCULATED.3IONS-SCNC: 10 MMOL/L (ref 5–15)
AST SERPL-CCNC: 26 U/L (ref 1–32)
B PARAPERT DNA SPEC QL NAA+PROBE: NOT DETECTED
B PERT DNA SPEC QL NAA+PROBE: NOT DETECTED
BACTERIA UR QL AUTO: ABNORMAL /HPF
BARBITURATES UR QL SCN: NEGATIVE
BASOPHILS # BLD AUTO: 0.06 10*3/MM3 (ref 0–0.2)
BASOPHILS NFR BLD AUTO: 0.7 % (ref 0–1.5)
BENZODIAZ UR QL SCN: NEGATIVE
BILIRUB SERPL-MCNC: 0.2 MG/DL (ref 0–1.2)
BILIRUB UR QL STRIP: NEGATIVE
BUN SERPL-MCNC: 10 MG/DL (ref 6–20)
BUN/CREAT SERPL: 19.2 (ref 7–25)
BUPRENORPHINE SERPL-MCNC: NEGATIVE NG/ML
C PNEUM DNA NPH QL NAA+NON-PROBE: NOT DETECTED
CALCIUM SPEC-SCNC: 9.8 MG/DL (ref 8.6–10.5)
CANNABINOIDS SERPL QL: NEGATIVE
CHLORIDE SERPL-SCNC: 105 MMOL/L (ref 98–107)
CLARITY UR: CLEAR
CO2 SERPL-SCNC: 27 MMOL/L (ref 22–29)
COCAINE UR QL: NEGATIVE
COLOR UR: YELLOW
CREAT SERPL-MCNC: 0.52 MG/DL (ref 0.57–1)
DEPRECATED RDW RBC AUTO: 42.5 FL (ref 37–54)
EGFRCR SERPLBLD CKD-EPI 2021: 116.2 ML/MIN/1.73
EOSINOPHIL # BLD AUTO: 0.12 10*3/MM3 (ref 0–0.4)
EOSINOPHIL NFR BLD AUTO: 1.4 % (ref 0.3–6.2)
ERYTHROCYTE [DISTWIDTH] IN BLOOD BY AUTOMATED COUNT: 13.3 % (ref 12.3–15.4)
FENTANYL UR-MCNC: NEGATIVE NG/ML
FLUAV SUBTYP SPEC NAA+PROBE: NOT DETECTED
FLUBV RNA ISLT QL NAA+PROBE: NOT DETECTED
GEN 5 1HR TROPONIN T REFLEX: <6 NG/L
GLOBULIN UR ELPH-MCNC: 2.5 GM/DL
GLUCOSE SERPL-MCNC: 92 MG/DL (ref 65–99)
GLUCOSE UR STRIP-MCNC: NEGATIVE MG/DL
HADV DNA SPEC NAA+PROBE: NOT DETECTED
HCOV 229E RNA SPEC QL NAA+PROBE: NOT DETECTED
HCOV HKU1 RNA SPEC QL NAA+PROBE: NOT DETECTED
HCOV NL63 RNA SPEC QL NAA+PROBE: NOT DETECTED
HCOV OC43 RNA SPEC QL NAA+PROBE: NOT DETECTED
HCT VFR BLD AUTO: 39.7 % (ref 34–46.6)
HGB BLD-MCNC: 12.9 G/DL (ref 12–15.9)
HGB UR QL STRIP.AUTO: NEGATIVE
HMPV RNA NPH QL NAA+NON-PROBE: NOT DETECTED
HPIV1 RNA ISLT QL NAA+PROBE: NOT DETECTED
HPIV2 RNA SPEC QL NAA+PROBE: NOT DETECTED
HPIV3 RNA NPH QL NAA+PROBE: NOT DETECTED
HPIV4 P GENE NPH QL NAA+PROBE: NOT DETECTED
HYALINE CASTS UR QL AUTO: ABNORMAL /LPF
IMM GRANULOCYTES # BLD AUTO: 0.03 10*3/MM3 (ref 0–0.05)
IMM GRANULOCYTES NFR BLD AUTO: 0.4 % (ref 0–0.5)
KETONES UR QL STRIP: NEGATIVE
LEUKOCYTE ESTERASE UR QL STRIP.AUTO: ABNORMAL
LYMPHOCYTES # BLD AUTO: 2.88 10*3/MM3 (ref 0.7–3.1)
LYMPHOCYTES NFR BLD AUTO: 33.6 % (ref 19.6–45.3)
M PNEUMO IGG SER IA-ACNC: NOT DETECTED
MAGNESIUM SERPL-MCNC: 2 MG/DL (ref 1.6–2.6)
MCH RBC QN AUTO: 28.5 PG (ref 26.6–33)
MCHC RBC AUTO-ENTMCNC: 32.5 G/DL (ref 31.5–35.7)
MCV RBC AUTO: 87.6 FL (ref 79–97)
METHADONE UR QL SCN: NEGATIVE
MONOCYTES # BLD AUTO: 0.53 10*3/MM3 (ref 0.1–0.9)
MONOCYTES NFR BLD AUTO: 6.2 % (ref 5–12)
NEUTROPHILS NFR BLD AUTO: 4.94 10*3/MM3 (ref 1.7–7)
NEUTROPHILS NFR BLD AUTO: 57.7 % (ref 42.7–76)
NITRITE UR QL STRIP: NEGATIVE
NRBC BLD AUTO-RTO: 0 /100 WBC (ref 0–0.2)
OPIATES UR QL: NEGATIVE
OXYCODONE UR QL SCN: NEGATIVE
PCP UR QL SCN: NEGATIVE
PH UR STRIP.AUTO: 6.5 [PH] (ref 5–8)
PLATELET # BLD AUTO: 319 10*3/MM3 (ref 140–450)
PMV BLD AUTO: 9.9 FL (ref 6–12)
POTASSIUM SERPL-SCNC: 4.2 MMOL/L (ref 3.5–5.2)
PROT SERPL-MCNC: 6.6 G/DL (ref 6–8.5)
PROT UR QL STRIP: NEGATIVE
RBC # BLD AUTO: 4.53 10*6/MM3 (ref 3.77–5.28)
RBC # UR STRIP: ABNORMAL /HPF
REF LAB TEST METHOD: ABNORMAL
RHINOVIRUS RNA SPEC NAA+PROBE: NOT DETECTED
RSV RNA NPH QL NAA+NON-PROBE: NOT DETECTED
SARS-COV-2 RNA NPH QL NAA+NON-PROBE: NOT DETECTED
SODIUM SERPL-SCNC: 142 MMOL/L (ref 136–145)
SP GR UR STRIP: 1.01 (ref 1–1.03)
SQUAMOUS #/AREA URNS HPF: ABNORMAL /HPF
T4 FREE SERPL-MCNC: 1.51 NG/DL (ref 0.92–1.68)
TRICYCLICS UR QL SCN: NEGATIVE
TROPONIN T NUMERIC DELTA: NORMAL
TROPONIN T SERPL HS-MCNC: 7 NG/L
TSH SERPL DL<=0.05 MIU/L-ACNC: <0.005 UIU/ML (ref 0.27–4.2)
UROBILINOGEN UR QL STRIP: ABNORMAL
WBC # UR STRIP: ABNORMAL /HPF
WBC NRBC COR # BLD AUTO: 8.56 10*3/MM3 (ref 3.4–10.8)

## 2025-01-07 PROCEDURE — 25010000002 ONDANSETRON PER 1 MG: Performed by: EMERGENCY MEDICINE

## 2025-01-07 PROCEDURE — 85025 COMPLETE CBC W/AUTO DIFF WBC: CPT | Performed by: EMERGENCY MEDICINE

## 2025-01-07 PROCEDURE — 83735 ASSAY OF MAGNESIUM: CPT | Performed by: EMERGENCY MEDICINE

## 2025-01-07 PROCEDURE — 0202U NFCT DS 22 TRGT SARS-COV-2: CPT | Performed by: EMERGENCY MEDICINE

## 2025-01-07 PROCEDURE — 84439 ASSAY OF FREE THYROXINE: CPT | Performed by: EMERGENCY MEDICINE

## 2025-01-07 PROCEDURE — 70551 MRI BRAIN STEM W/O DYE: CPT

## 2025-01-07 PROCEDURE — 96375 TX/PRO/DX INJ NEW DRUG ADDON: CPT

## 2025-01-07 PROCEDURE — 25010000002 DIAZEPAM PER 5 MG: Performed by: EMERGENCY MEDICINE

## 2025-01-07 PROCEDURE — 93005 ELECTROCARDIOGRAM TRACING: CPT | Performed by: EMERGENCY MEDICINE

## 2025-01-07 PROCEDURE — 99284 EMERGENCY DEPT VISIT MOD MDM: CPT

## 2025-01-07 PROCEDURE — 71045 X-RAY EXAM CHEST 1 VIEW: CPT

## 2025-01-07 PROCEDURE — 84443 ASSAY THYROID STIM HORMONE: CPT | Performed by: EMERGENCY MEDICINE

## 2025-01-07 PROCEDURE — 80307 DRUG TEST PRSMV CHEM ANLYZR: CPT | Performed by: EMERGENCY MEDICINE

## 2025-01-07 PROCEDURE — 84484 ASSAY OF TROPONIN QUANT: CPT | Performed by: EMERGENCY MEDICINE

## 2025-01-07 PROCEDURE — 36415 COLL VENOUS BLD VENIPUNCTURE: CPT

## 2025-01-07 PROCEDURE — 80053 COMPREHEN METABOLIC PANEL: CPT | Performed by: EMERGENCY MEDICINE

## 2025-01-07 PROCEDURE — 96374 THER/PROPH/DIAG INJ IV PUSH: CPT

## 2025-01-07 PROCEDURE — 25810000003 SODIUM CHLORIDE 0.9 % SOLUTION: Performed by: EMERGENCY MEDICINE

## 2025-01-07 PROCEDURE — 81001 URINALYSIS AUTO W/SCOPE: CPT | Performed by: EMERGENCY MEDICINE

## 2025-01-07 RX ORDER — SODIUM CHLORIDE 0.9 % (FLUSH) 0.9 %
10 SYRINGE (ML) INJECTION AS NEEDED
Status: DISCONTINUED | OUTPATIENT
Start: 2025-01-07 | End: 2025-01-08 | Stop reason: HOSPADM

## 2025-01-07 RX ORDER — MECLIZINE HYDROCHLORIDE 25 MG/1
25 TABLET ORAL ONCE
Status: COMPLETED | OUTPATIENT
Start: 2025-01-07 | End: 2025-01-07

## 2025-01-07 RX ORDER — ONDANSETRON 2 MG/ML
4 INJECTION INTRAMUSCULAR; INTRAVENOUS ONCE
Status: COMPLETED | OUTPATIENT
Start: 2025-01-07 | End: 2025-01-07

## 2025-01-07 RX ORDER — DIAZEPAM 10 MG/2ML
5 INJECTION, SOLUTION INTRAMUSCULAR; INTRAVENOUS ONCE
Status: COMPLETED | OUTPATIENT
Start: 2025-01-07 | End: 2025-01-07

## 2025-01-07 RX ADMIN — SODIUM CHLORIDE 1000 ML: 9 INJECTION, SOLUTION INTRAVENOUS at 20:57

## 2025-01-07 RX ADMIN — ONDANSETRON 4 MG: 2 INJECTION INTRAMUSCULAR; INTRAVENOUS at 20:59

## 2025-01-07 RX ADMIN — DIAZEPAM 5 MG: 10 INJECTION, SOLUTION INTRAMUSCULAR; INTRAVENOUS at 20:59

## 2025-01-07 RX ADMIN — MECLIZINE HYDROCHLORIDE 25 MG: 25 TABLET ORAL at 20:59

## 2025-01-08 LAB
QT INTERVAL: 366 MS
QTC INTERVAL: 467 MS

## 2025-01-08 RX ORDER — ONDANSETRON 4 MG/1
4 TABLET, ORALLY DISINTEGRATING ORAL EVERY 4 HOURS
Qty: 12 TABLET | Refills: 0 | Status: SHIPPED | OUTPATIENT
Start: 2025-01-08

## 2025-01-08 RX ORDER — MECLIZINE HYDROCHLORIDE 25 MG/1
25 TABLET ORAL EVERY 6 HOURS PRN
Qty: 21 TABLET | Refills: 0 | Status: SHIPPED | OUTPATIENT
Start: 2025-01-08

## 2025-01-08 NOTE — DISCHARGE INSTRUCTIONS
ER workup was reassuring.  Patient had normal cardiac workup with normal EKG and 2 normal cardiac troponins.  MRI of the brain without contrast revealed no acute intracranial abnormality, such as stroke, bleed, or tumor.  Respiratory PCR panel tested negative for multiple common respiratory viruses, including COVID-19, influenza, and RSV.  Urinalysis showed no evidence of urinary tract infection.  CBC and chemistries were normal.  Recommend patient to follow-up at our vestibular clinic to help with imbalance and tinnitus and vertigo.  Encourage fluids and we will prescribe meclizine and Zofran as needed for nausea and dizziness.  Contact Dr. Jeffries, routine neurologist, for first available recheck.  Also call [facility] to see if they can see patient sooner than February, 2025 due to recurrent symptoms that are interfering with patient's activities of daily living.  Encourage fluids and rest.  Take all other medications as prescribed.  Return to the ER if worsening symptoms.

## 2025-01-08 NOTE — ED PROVIDER NOTES
Subjective   History of Present Illness  This is a 44-year-old female that presents to the ER with sudden onset of imbalance, head spinning, lightheadedness, and nausea with vomiting that began 1 hour ago.  Patient has past medical history of pituitary adenoma with resection in 2022.  Patient says that she has had chronic headaches, intermittent dizziness, imbalance, vertigo, and episodes of confusion ever since.  She is followed by neurology, Dr. Jeffries, at .  Workups have all been reassuring, the patient continues to have chronic, daily symptoms.  She is extremely frustrated because she is not finding any answers.  Patient says that she has been referred to the [facility] and has a follow-up in February, 2024.  She does report some mild nasal congestion, postnasal drip, and some intermittent tinnitus to the right ear.  She says headache is right sided and it feels like throbbing with pressure.  She denies any fever, chills, neck pain or stiffness.  Patient vomited a large amount tonight.  Patient also says that she will get confused and not know who certain people are or where she is.  Has been states that this has happened chronically, as well.  Wife will have confusion for around 3 days and then go back to her baseline.  Patient denies any chest pain or shortness of breath.  She does not have any unilateral extremity weakness or numbness/tingling.  She just feels weak all over her body.  She denies any vision changes.  Patient says that she was recently started on Topamax for questionable seizures.  After reviewing Dr. Jeffries's most recent office noted from 12/19/24, he reports that he does not believe patient has multiple sclerosis or an underlying neuro immune condition.  Patient did have a couple of small lesions in the thoracic cord.  Patient had recent epilepsy monitoring and there were no seizures captured but she did not have her typical event.  Neurology reviewed her EEG and there was no  seizure activity.  There was occasional intermittent slow waves noted over the left temporal region rarely the right temporal region but no events were recorded and no spikes.  Patient denies any dysuria, urgency, or frequency.  She denies any bowel changes.  Last menstrual period: Patient has not had menses since IUD in 2015 and subsequent radiation for her pituitary adenoma.  Patient has a young son and denies any known sick contacts.  Symptoms of dizziness and imbalance are worsened with position changes and movement of the head.    History provided by:  Patient and spouse ()  Dizziness  Quality:  Imbalance, head spinning and lightheadedness  Duration:  1 hour  Chronicity:  Chronic  Context: head movement and standing up    Context comment:  History of chronic HA, dizziness, vertigo, intermittent n/v, with h/o pituitary adenoma and removal in 2022 with subsequent radiation. Pt follows with Dr. Jeffries, Neurology, and thought to also have possible seizures.  Relieved by:  Nothing  Worsened by:  Movement, standing up and turning head  Associated symptoms: headaches (right sided; aching.), nausea, tinnitus (intermittent tinnitus to right ear.), vomiting (x1; large amount.) and weakness    Associated symptoms: no blood in stool, no chest pain, no diarrhea, no hearing loss, no palpitations, no shortness of breath, no syncope and no vision changes    Risk factors comment:  History of pituitary adenoma with re-section in 2022. Chronic dizziness and HAs and intermittent n/v ever since. Pt follows with Dr. Jeffries at , Neurology. He believes ? demyelinating disease with ? seizure disorder. Pt on Topamax.      Review of Systems   Constitutional:  Positive for activity change, appetite change and fatigue.   HENT:  Positive for congestion, postnasal drip, rhinorrhea and tinnitus (intermittent tinnitus to right ear.). Negative for hearing loss.    Eyes: Negative.  Negative for visual disturbance.   Respiratory:  Negative.  Negative for chest tightness and shortness of breath.    Cardiovascular: Negative.  Negative for chest pain, palpitations and syncope.   Gastrointestinal:  Positive for nausea and vomiting (x1; large amount.). Negative for abdominal pain, blood in stool and diarrhea.   Genitourinary: Negative.  Negative for dysuria, flank pain, frequency and urgency.        LMP: 2015 secondary to IUD and then radiation for pituitary adenoma.   Musculoskeletal:  Positive for gait problem (impaired ambulation secondary to generalized weakness.).   Skin: Negative.    Neurological:  Positive for dizziness, weakness, light-headedness and headaches (right sided; aching.). Negative for syncope, facial asymmetry and speech difficulty.        History of pituitary adenoma with re-section in 2022. Chronic headaches and dizziness and imbalance ever since with intermittent n/v   Psychiatric/Behavioral:  Positive for confusion (Intermittent confusion.  says confusion may last 3 days then resolves; this is chronic since pituitary adenoma removed.). Negative for hallucinations and sleep disturbance.    All other systems reviewed and are negative.      Past Medical History:   Diagnosis Date    CHF (congestive heart failure)     Depression     Elevated cholesterol     Hyperlipemia     Hypothyroid     Sleep apnea        Allergies   Allergen Reactions    Wellbutrin [Bupropion] Hives       Past Surgical History:   Procedure Laterality Date    BRAIN SURGERY      CARDIAC CATHETERIZATION N/A 8/10/2023    Procedure: Left Heart Cath;  Surgeon: Yury Barclay MD;  Location: [facility] CATH INVASIVE LOCATION;  Service: Cardiology;  Laterality: N/A;    DILATION AND CURETTAGE, DIAGNOSTIC / THERAPEUTIC         Family History   Adopted: Yes   Problem Relation Age of Onset    No Known Problems Mother     No Known Problems Father     No Known Problems Maternal Grandmother     No Known Problems Maternal Grandfather     No Known Problems Paternal  Grandmother     No Known Problems Paternal Grandfather        Social History     Socioeconomic History    Marital status:    Tobacco Use    Smoking status: Never     Passive exposure: Past    Smokeless tobacco: Never   Vaping Use    Vaping status: Never Used   Substance and Sexual Activity    Alcohol use: No    Drug use: No    Sexual activity: Yes     Partners: Male     Birth control/protection: Surgical           Objective   Physical Exam  Vitals and nursing note reviewed.   Constitutional:       General: She is not in acute distress.     Appearance: Normal appearance. She is obese. She is not ill-appearing, toxic-appearing or diaphoretic.      Comments: No acute sign pain or distress.  Nontoxic.  Obesity with BMI of 44   HENT:      Head: Normocephalic and atraumatic. No abrasion, contusion or laceration.      Jaw: There is normal jaw occlusion.      Comments: No scalp tenderness or sign of injury     Right Ear: Tympanic membrane normal. Tympanic membrane is not erythematous, retracted or bulging.      Left Ear: Tympanic membrane normal. Tympanic membrane is not erythematous, retracted or bulging.      Ears:      Comments: Bilateral TMs are clear.  No air-fluid levels noted.  No bulging or erythema to bilateral TMs.     Nose: Nose normal. No congestion or rhinorrhea.      Right Sinus: No maxillary sinus tenderness or frontal sinus tenderness.      Left Sinus: No maxillary sinus tenderness or frontal sinus tenderness.      Comments: I do not appreciate any audible nasal congestion or rhinorrhea.  No frontal or maxillary sinus tenderness.     Mouth/Throat:      Mouth: Mucous membranes are moist.      Pharynx: Oropharynx is clear.   Eyes:      General: Vision grossly intact.      Extraocular Movements: Extraocular movements intact.      Right eye: Normal extraocular motion and no nystagmus.      Left eye: Normal extraocular motion and no nystagmus.      Conjunctiva/sclera: Conjunctivae normal.      Pupils:  Pupils are equal, round, and reactive to light.      Comments: Vision grossly normal.  Pupils equal round reactive to light.  Extraocular movements intact.  No nystagmus   Neck:      Thyroid: No thyromegaly.      Comments: No C-spine tenderness.  Full range of motion.  No cervical lymphadenopathy.  No thyromegaly  Cardiovascular:      Rate and Rhythm: Normal rate and regular rhythm. No extrasystoles are present.     Pulses: Normal pulses.      Heart sounds: Normal heart sounds. No murmur heard.     Comments: Regular rate and rhythm.  No tachycardia or ectopy.  No murmurs noted.  No pedal edema to lower extremities  Pulmonary:      Effort: Pulmonary effort is normal.      Breath sounds: Normal breath sounds.      Comments: Lungs are clear to auscultation bilaterally  Abdominal:      General: Bowel sounds are normal.      Palpations: Abdomen is soft.   Musculoskeletal:         General: Normal range of motion.      Cervical back: Normal range of motion and neck supple. No pain with movement, spinous process tenderness or muscular tenderness.      Right lower leg: No edema.      Left lower leg: No edema.   Lymphadenopathy:      Cervical: No cervical adenopathy.      Right cervical: No superficial, deep or posterior cervical adenopathy.     Left cervical: No superficial, deep or posterior cervical adenopathy.   Skin:     General: Skin is warm and dry.   Neurological:      General: No focal deficit present.      Mental Status: She is alert and oriented to person, place, and time.      Cranial Nerves: Cranial nerves 2-12 are intact.      Sensory: Sensation is intact.      Motor: Motor function is intact.      Coordination: Coordination is intact.      Comments: Alert and oriented x 3.  Smile is equal and tongue is midline.  Speech is clear and concentrated.  Equal  strength 4 out of 5 bilaterally and equal muscle strength lower extremities 4 out of 5.  Normal finger-to-nose touch bilaterally.  No focal neurologic  deficits.   Psychiatric:         Mood and Affect: Affect normal. Mood is anxious.         Speech: Speech normal.         Behavior: Behavior normal. Behavior is cooperative.         Thought Content: Thought content normal.         Cognition and Memory: Cognition and memory normal.         Judgment: Judgment normal.      Comments: Anxious on exam.  Cooperative.  Normal speech.  Normal thought content and normal cognition and memory.         Procedures           ED Course  ED Course as of 01/08/25 0248   Tue Jan 07, 2025 1959 Referral has been made to [facility] in 2/2025 for second opinion per Dr. Jeffries, Neurology. [FC]   Wed Jan 08, 2025   0016 I personally interpreted EKG which showed sinus rhythm.  There was no evidence of ectopy, arrhythmia, or ischemia.  I also personally interpreted chest x-ray which showed no acute cardiopulmonary process.  CBC and chemistries were essentially normal.  Respiratory PCR panel was completely negative.  TSH was less than 0.005.  High-sensitivity troponins x 2 were 7 and less than 6.  Urinalysis reveals trace leukocytes but no other acute infectious process.  UDS is completely negative.  Free T4 is normal at 1.51.  Magnesium is 2.0.  Respiratory PCR panel is completely negative.  I gave IV fluid bolus, meclizine, Valium, and Zofran.  We proceeded with MRI of the brain without contrast for further assessment of vertigo, right sided headache, dizziness, and imbalance with history of pituitary adenoma s/p resection.  I also thoroughly reviewed patient's medical records, including last neurology office note from , Dr. Jeffries, on 12/19/24.  Patient has been ruled out for any MS, although  demyelinating disease is listed as one of her diagnoses.  Patient also had EEG which showed some slowing to the temporal region, but there was no acute spikes or acute seizure activity.  Patient was started on Topamax per history and she has referral made to [facility] in February,  2024.  Neurologic exam today was within normal limits and vital signs are stable.  Awaiting MRI results. [FC]   0242 MRI of the brain without contrast reveals no acute intracranial abnormality.  Upon reassessment, patient feels better after IV fluid bolus and meclizine, Valium, and Zofran.  She does have history of labyrinthitis, and we discussed follow-up at our vestibular clinic.  Patient is very agreeable to do this and wonders if it may help.  I also advised her to contact [facility] to see if they can get her in sooner than February, 2025.  Encourage fluids and rest.  ER workup was reassuring.  Symptoms have been ongoing and chronic the last few years. [FC]      ED Course User Index  [FC] Kinjal Nunes, QUINCY        Recent Results (from the past 24 hours)   ECG 12 Lead Other; dizziness    Collection Time: 01/07/25  8:13 PM   Result Value Ref Range    QT Interval 366 ms    QTC Interval 467 ms   Respiratory Panel PCR w/COVID-19(SARS-CoV-2) [facility] In-House, NP Swab in UTM/VTM, 2 HR TAT - Swab, Nasopharynx    Collection Time: 01/07/25  8:34 PM    Specimen: Nasopharynx; Swab   Result Value Ref Range    ADENOVIRUS, PCR Not Detected Not Detected    Coronavirus 229E Not Detected Not Detected    Coronavirus HKU1 Not Detected Not Detected    Coronavirus NL63 Not Detected Not Detected    Coronavirus OC43 Not Detected Not Detected    COVID19 Not Detected Not Detected - Ref. Range    Human Metapneumovirus Not Detected Not Detected    Human Rhinovirus/Enterovirus Not Detected Not Detected    Influenza A PCR Not Detected Not Detected    Influenza B PCR Not Detected Not Detected    Parainfluenza Virus 1 Not Detected Not Detected    Parainfluenza Virus 2 Not Detected Not Detected    Parainfluenza Virus 3 Not Detected Not Detected    Parainfluenza Virus 4 Not Detected Not Detected    RSV, PCR Not Detected Not Detected    Bordetella pertussis pcr Not Detected Not Detected    Bordetella parapertussis PCR Not  Detected Not Detected    Chlamydophila pneumoniae PCR Not Detected Not Detected    Mycoplasma pneumo by PCR Not Detected Not Detected   Comprehensive Metabolic Panel    Collection Time: 01/07/25  9:04 PM    Specimen: Blood   Result Value Ref Range    Glucose 92 65 - 99 mg/dL    BUN 10 6 - 20 mg/dL    Creatinine 0.52 (L) 0.57 - 1.00 mg/dL    Sodium 142 136 - 145 mmol/L    Potassium 4.2 3.5 - 5.2 mmol/L    Chloride 105 98 - 107 mmol/L    CO2 27.0 22.0 - 29.0 mmol/L    Calcium 9.8 8.6 - 10.5 mg/dL    Total Protein 6.6 6.0 - 8.5 g/dL    Albumin 4.1 3.5 - 5.2 g/dL    ALT (SGPT) 29 1 - 33 U/L    AST (SGOT) 26 1 - 32 U/L    Alkaline Phosphatase 158 (H) 39 - 117 U/L    Total Bilirubin 0.2 0.0 - 1.2 mg/dL    Globulin 2.5 gm/dL    A/G Ratio 1.6 g/dL    BUN/Creatinine Ratio 19.2 7.0 - 25.0    Anion Gap 10.0 5.0 - 15.0 mmol/L    eGFR 116.2 >60.0 mL/min/1.73   High Sensitivity Troponin T    Collection Time: 01/07/25  9:04 PM    Specimen: Blood   Result Value Ref Range    HS Troponin T 7 <14 ng/L   Magnesium    Collection Time: 01/07/25  9:04 PM    Specimen: Blood   Result Value Ref Range    Magnesium 2.0 1.6 - 2.6 mg/dL   TSH    Collection Time: 01/07/25  9:04 PM    Specimen: Blood   Result Value Ref Range    TSH <0.005 (L) 0.270 - 4.200 uIU/mL   T4, Free    Collection Time: 01/07/25  9:04 PM    Specimen: Blood   Result Value Ref Range    Free T4 1.51 0.92 - 1.68 ng/dL   CBC Auto Differential    Collection Time: 01/07/25  9:04 PM    Specimen: Blood   Result Value Ref Range    WBC 8.56 3.40 - 10.80 10*3/mm3    RBC 4.53 3.77 - 5.28 10*6/mm3    Hemoglobin 12.9 12.0 - 15.9 g/dL    Hematocrit 39.7 34.0 - 46.6 %    MCV 87.6 79.0 - 97.0 fL    MCH 28.5 26.6 - 33.0 pg    MCHC 32.5 31.5 - 35.7 g/dL    RDW 13.3 12.3 - 15.4 %    RDW-SD 42.5 37.0 - 54.0 fl    MPV 9.9 6.0 - 12.0 fL    Platelets 319 140 - 450 10*3/mm3    Neutrophil % 57.7 42.7 - 76.0 %    Lymphocyte % 33.6 19.6 - 45.3 %    Monocyte % 6.2 5.0 - 12.0 %    Eosinophil % 1.4 0.3 -  6.2 %    Basophil % 0.7 0.0 - 1.5 %    Immature Grans % 0.4 0.0 - 0.5 %    Neutrophils, Absolute 4.94 1.70 - 7.00 10*3/mm3    Lymphocytes, Absolute 2.88 0.70 - 3.10 10*3/mm3    Monocytes, Absolute 0.53 0.10 - 0.90 10*3/mm3    Eosinophils, Absolute 0.12 0.00 - 0.40 10*3/mm3    Basophils, Absolute 0.06 0.00 - 0.20 10*3/mm3    Immature Grans, Absolute 0.03 0.00 - 0.05 10*3/mm3    nRBC 0.0 0.0 - 0.2 /100 WBC   Urinalysis With Microscopic If Indicated (No Culture) - Urine, Clean Catch    Collection Time: 01/07/25  9:13 PM    Specimen: Urine, Clean Catch   Result Value Ref Range    Color, UA Yellow Yellow, Straw    Appearance, UA Clear Clear    pH, UA 6.5 5.0 - 8.0    Specific Gravity, UA 1.014 1.001 - 1.030    Glucose, UA Negative Negative    Ketones, UA Negative Negative    Bilirubin, UA Negative Negative    Blood, UA Negative Negative    Protein, UA Negative Negative    Leuk Esterase, UA Trace (A) Negative    Nitrite, UA Negative Negative    Urobilinogen, UA 1.0 E.U./dL 0.2 - 1.0 E.U./dL   Urine Drug Screen - Urine, Clean Catch    Collection Time: 01/07/25  9:13 PM    Specimen: Urine, Clean Catch   Result Value Ref Range    THC, Screen, Urine Negative Negative    Phencyclidine (PCP), Urine Negative Negative    Cocaine Screen, Urine Negative Negative    Methamphetamine, Ur Negative Negative    Opiate Screen Negative Negative    Amphetamine Screen, Urine Negative Negative    Benzodiazepine Screen, Urine Negative Negative    Tricyclic Antidepressants Screen Negative Negative    Methadone Screen, Urine Negative Negative    Barbiturates Screen, Urine Negative Negative    Oxycodone Screen, Urine Negative Negative    Buprenorphine, Screen, Urine Negative Negative   Fentanyl, Urine - Urine, Clean Catch    Collection Time: 01/07/25  9:13 PM    Specimen: Urine, Clean Catch   Result Value Ref Range    Fentanyl, Urine Negative Negative   Urinalysis, Microscopic Only - Urine, Clean Catch    Collection Time: 01/07/25  9:13 PM     "Specimen: Urine, Clean Catch   Result Value Ref Range    RBC, UA 0-2 None Seen, 0-2 /HPF    WBC, UA 6-10 (A) None Seen, 0-2 /HPF    Bacteria, UA Trace None Seen, Trace /HPF    Squamous Epithelial Cells, UA 7-12 (A) None Seen, 0-2 /HPF    Hyaline Casts, UA None Seen 0 - 6 /LPF    Methodology Automated Microscopy    High Sensitivity Troponin T 1Hr    Collection Time: 01/07/25 10:14 PM    Specimen: Blood   Result Value Ref Range    HS Troponin T <6 <14 ng/L    Troponin T Numeric Delta       Note: In addition to lab results from this visit, the labs listed above may include labs taken at another facility or during a different encounter within the last 24 hours. Please correlate lab times with ED admission and discharge times for further clarification of the services performed during this visit.    MRI Brain Without Contrast   Final Result   Impression:   Normal brain MRI examination.                  Electronically Signed: Venancio Escobar MD     1/8/2025 2:07 AM EST     Workstation ID: [id number]      XR Chest 1 View   Final Result   Impression:   No new chest disease.         Electronically Signed: Solomon Good MD     1/7/2025 8:47 PM EST     Workstation ID: [id number]        Vitals:    01/07/25 1947   BP: 119/85   BP Location: Left arm   Patient Position: Sitting   Pulse: 96   Resp: 20   Temp: 98.7 °F (37.1 °C)   TempSrc: Oral   SpO2: 99%   Weight: 113 kg (250 lb)   Height: 160 cm (63\")     Medications   sodium chloride 0.9 % flush 10 mL (has no administration in time range)   sodium chloride 0.9 % bolus 1,000 mL (0 mL Intravenous Stopped 1/7/25 2259)   meclizine (ANTIVERT) tablet 25 mg (25 mg Oral Given 1/7/25 2059)   ondansetron (ZOFRAN) injection 4 mg (4 mg Intravenous Given 1/7/25 2059)   diazePAM (VALIUM) injection 5 mg (5 mg Intravenous Given 1/7/25 2059)     ECG/EMG Results (last 24 hours)       Procedure Component Value Units Date/Time    ECG 12 Lead Other; dizziness [[id number]] Collected: 01/07/25 2013     Updated: " 01/07/25 2013     QT Interval 366 ms      QTC Interval 467 ms     Narrative:      Test Reason : Other~  Blood Pressure :   */*   mmHG  Vent. Rate :  98 BPM     Atrial Rate :  98 BPM     P-R Int : 142 ms          QRS Dur :  86 ms      QT Int : 366 ms       P-R-T Axes :  40  -2   0 degrees    QTcB Int : 467 ms    Normal sinus rhythm  Voltage criteria for left ventricular hypertrophy  Nonspecific ST and T wave abnormality  Prolonged QT  Abnormal ECG  When compared with ECG of 19-Oct-2024 09:22,  No significant change was found    Referred By: EDMD           Confirmed By:           ECG 12 Lead Other; dizziness   Preliminary Result   Test Reason : Other~   Blood Pressure :   */*   mmHG   Vent. Rate :  98 BPM     Atrial Rate :  98 BPM      P-R Int : 142 ms          QRS Dur :  86 ms       QT Int : 366 ms       P-R-T Axes :  40  -2   0 degrees     QTcB Int : 467 ms      Normal sinus rhythm   Voltage criteria for left ventricular hypertrophy   Nonspecific ST and T wave abnormality   Prolonged QT   Abnormal ECG   When compared with ECG of 19-Oct-2024 09:22,   No significant change was found      Referred By: EDMD           Confirmed By:                                                        Recent Results (from the past 24 hours)   ECG 12 Lead Other; dizziness    Collection Time: 01/07/25  8:13 PM   Result Value Ref Range    QT Interval 366 ms    QTC Interval 467 ms   Respiratory Panel PCR w/COVID-19(SARS-CoV-2) [facility] In-House, NP Swab in [facility], 2 HR TAT - Swab, Nasopharynx    Collection Time: 01/07/25  8:34 PM    Specimen: Nasopharynx; Swab   Result Value Ref Range    ADENOVIRUS, PCR Not Detected Not Detected    Coronavirus 229E Not Detected Not Detected    Coronavirus HKU1 Not Detected Not Detected    Coronavirus NL63 Not Detected Not Detected    Coronavirus OC43 Not Detected Not Detected    COVID19 Not Detected Not Detected - Ref. Range    Human Metapneumovirus Not Detected Not Detected    Human  Rhinovirus/Enterovirus Not Detected Not Detected    Influenza A PCR Not Detected Not Detected    Influenza B PCR Not Detected Not Detected    Parainfluenza Virus 1 Not Detected Not Detected    Parainfluenza Virus 2 Not Detected Not Detected    Parainfluenza Virus 3 Not Detected Not Detected    Parainfluenza Virus 4 Not Detected Not Detected    RSV, PCR Not Detected Not Detected    Bordetella pertussis pcr Not Detected Not Detected    Bordetella parapertussis PCR Not Detected Not Detected    Chlamydophila pneumoniae PCR Not Detected Not Detected    Mycoplasma pneumo by PCR Not Detected Not Detected   Comprehensive Metabolic Panel    Collection Time: 01/07/25  9:04 PM    Specimen: Blood   Result Value Ref Range    Glucose 92 65 - 99 mg/dL    BUN 10 6 - 20 mg/dL    Creatinine 0.52 (L) 0.57 - 1.00 mg/dL    Sodium 142 136 - 145 mmol/L    Potassium 4.2 3.5 - 5.2 mmol/L    Chloride 105 98 - 107 mmol/L    CO2 27.0 22.0 - 29.0 mmol/L    Calcium 9.8 8.6 - 10.5 mg/dL    Total Protein 6.6 6.0 - 8.5 g/dL    Albumin 4.1 3.5 - 5.2 g/dL    ALT (SGPT) 29 1 - 33 U/L    AST (SGOT) 26 1 - 32 U/L    Alkaline Phosphatase 158 (H) 39 - 117 U/L    Total Bilirubin 0.2 0.0 - 1.2 mg/dL    Globulin 2.5 gm/dL    A/G Ratio 1.6 g/dL    BUN/Creatinine Ratio 19.2 7.0 - 25.0    Anion Gap 10.0 5.0 - 15.0 mmol/L    eGFR 116.2 >60.0 mL/min/1.73   High Sensitivity Troponin T    Collection Time: 01/07/25  9:04 PM    Specimen: Blood   Result Value Ref Range    HS Troponin T 7 <14 ng/L   Magnesium    Collection Time: 01/07/25  9:04 PM    Specimen: Blood   Result Value Ref Range    Magnesium 2.0 1.6 - 2.6 mg/dL   TSH    Collection Time: 01/07/25  9:04 PM    Specimen: Blood   Result Value Ref Range    TSH <0.005 (L) 0.270 - 4.200 uIU/mL   T4, Free    Collection Time: 01/07/25  9:04 PM    Specimen: Blood   Result Value Ref Range    Free T4 1.51 0.92 - 1.68 ng/dL   CBC Auto Differential    Collection Time: 01/07/25  9:04 PM    Specimen: Blood   Result Value Ref  Range    WBC 8.56 3.40 - 10.80 10*3/mm3    RBC 4.53 3.77 - 5.28 10*6/mm3    Hemoglobin 12.9 12.0 - 15.9 g/dL    Hematocrit 39.7 34.0 - 46.6 %    MCV 87.6 79.0 - 97.0 fL    MCH 28.5 26.6 - 33.0 pg    MCHC 32.5 31.5 - 35.7 g/dL    RDW 13.3 12.3 - 15.4 %    RDW-SD 42.5 37.0 - 54.0 fl    MPV 9.9 6.0 - 12.0 fL    Platelets 319 140 - 450 10*3/mm3    Neutrophil % 57.7 42.7 - 76.0 %    Lymphocyte % 33.6 19.6 - 45.3 %    Monocyte % 6.2 5.0 - 12.0 %    Eosinophil % 1.4 0.3 - 6.2 %    Basophil % 0.7 0.0 - 1.5 %    Immature Grans % 0.4 0.0 - 0.5 %    Neutrophils, Absolute 4.94 1.70 - 7.00 10*3/mm3    Lymphocytes, Absolute 2.88 0.70 - 3.10 10*3/mm3    Monocytes, Absolute 0.53 0.10 - 0.90 10*3/mm3    Eosinophils, Absolute 0.12 0.00 - 0.40 10*3/mm3    Basophils, Absolute 0.06 0.00 - 0.20 10*3/mm3    Immature Grans, Absolute 0.03 0.00 - 0.05 10*3/mm3    nRBC 0.0 0.0 - 0.2 /100 WBC   Urinalysis With Microscopic If Indicated (No Culture) - Urine, Clean Catch    Collection Time: 01/07/25  9:13 PM    Specimen: Urine, Clean Catch   Result Value Ref Range    Color, UA Yellow Yellow, Straw    Appearance, UA Clear Clear    pH, UA 6.5 5.0 - 8.0    Specific Gravity, UA 1.014 1.001 - 1.030    Glucose, UA Negative Negative    Ketones, UA Negative Negative    Bilirubin, UA Negative Negative    Blood, UA Negative Negative    Protein, UA Negative Negative    Leuk Esterase, UA Trace (A) Negative    Nitrite, UA Negative Negative    Urobilinogen, UA 1.0 E.U./dL 0.2 - 1.0 E.U./dL   Urine Drug Screen - Urine, Clean Catch    Collection Time: 01/07/25  9:13 PM    Specimen: Urine, Clean Catch   Result Value Ref Range    THC, Screen, Urine Negative Negative    Phencyclidine (PCP), Urine Negative Negative    Cocaine Screen, Urine Negative Negative    Methamphetamine, Ur Negative Negative    Opiate Screen Negative Negative    Amphetamine Screen, Urine Negative Negative    Benzodiazepine Screen, Urine Negative Negative    Tricyclic Antidepressants Screen  "Negative Negative    Methadone Screen, Urine Negative Negative    Barbiturates Screen, Urine Negative Negative    Oxycodone Screen, Urine Negative Negative    Buprenorphine, Screen, Urine Negative Negative   Fentanyl, Urine - Urine, Clean Catch    Collection Time: 01/07/25  9:13 PM    Specimen: Urine, Clean Catch   Result Value Ref Range    Fentanyl, Urine Negative Negative   Urinalysis, Microscopic Only - Urine, Clean Catch    Collection Time: 01/07/25  9:13 PM    Specimen: Urine, Clean Catch   Result Value Ref Range    RBC, UA 0-2 None Seen, 0-2 /HPF    WBC, UA 6-10 (A) None Seen, 0-2 /HPF    Bacteria, UA Trace None Seen, Trace /HPF    Squamous Epithelial Cells, UA 7-12 (A) None Seen, 0-2 /HPF    Hyaline Casts, UA None Seen 0 - 6 /LPF    Methodology Automated Microscopy    High Sensitivity Troponin T 1Hr    Collection Time: 01/07/25 10:14 PM    Specimen: Blood   Result Value Ref Range    HS Troponin T <6 <14 ng/L    Troponin T Numeric Delta       Note: In addition to lab results from this visit, the labs listed above may include labs taken at another facility or during a different encounter within the last 24 hours. Please correlate lab times with ED admission and discharge times for further clarification of the services performed during this visit.    MRI Brain Without Contrast   Final Result   Impression:   Normal brain MRI examination.                  Electronically Signed: Venancio Escobar MD     1/8/2025 2:07 AM EST     Workstation ID: [id number]      XR Chest 1 View   Final Result   Impression:   No new chest disease.         Electronically Signed: Solomon Good MD     1/7/2025 8:47 PM EST     Workstation ID: [id number]        Vitals:    01/07/25 1947   BP: 119/85   BP Location: Left arm   Patient Position: Sitting   Pulse: 96   Resp: 20   Temp: 98.7 °F (37.1 °C)   TempSrc: Oral   SpO2: 99%   Weight: 113 kg (250 lb)   Height: 160 cm (63\")     Medications   sodium chloride 0.9 % flush 10 mL (has no administration in " time range)   sodium chloride 0.9 % bolus 1,000 mL (0 mL Intravenous Stopped 1/7/25 2259)   meclizine (ANTIVERT) tablet 25 mg (25 mg Oral Given 1/7/25 2059)   ondansetron (ZOFRAN) injection 4 mg (4 mg Intravenous Given 1/7/25 2059)   diazePAM (VALIUM) injection 5 mg (5 mg Intravenous Given 1/7/25 2059)     ECG/EMG Results (last 24 hours)       ** No results found for the last 24 hours. **          ECG 12 Lead Other; dizziness   Preliminary Result   Test Reason : Other~   Blood Pressure :   */*   mmHG   Vent. Rate :  98 BPM     Atrial Rate :  98 BPM      P-R Int : 142 ms          QRS Dur :  86 ms       QT Int : 366 ms       P-R-T Axes :  40  -2   0 degrees     QTcB Int : 467 ms      Normal sinus rhythm   Voltage criteria for left ventricular hypertrophy   Nonspecific ST and T wave abnormality   Prolonged QT   Abnormal ECG   When compared with ECG of 19-Oct-2024 09:22,   No significant change was found      Referred By: EDMD           Confirmed By:               Medical Decision Making      Final diagnoses:   Right-sided headache   Dizziness   Vertigo   Nausea and vomiting, unspecified vomiting type   Imbalance   Tinnitus of right ear   Nasal congestion   History of pituitary adenoma   History of labyrinthitis   History of hypertension       ED Disposition  ED Disposition       ED Disposition   Discharge    Condition   Stable    Comment   --               Zen Guillermo, PT  [address] [id number]  [phone]    Call today  Call today for first available recheck at the vestibular clinic    Vignesh Jeffries,   [address]  [place] [id number] [phone]    Schedule an appointment as soon as possible for a visit in 2 days  Schedule close neurology follow-up for recheck    [facility]  [address] [facility] [address] [phone]    If symptoms worsen         Medication List        New Prescriptions      meclizine 25 MG  tablet  Commonly known as: ANTIVERT  Take 1 tablet by mouth Every 6 (Six) Hours As Needed for Dizziness.            Changed      * ondansetron ODT 4 MG disintegrating tablet  Commonly known as: ZOFRAN-ODT  What changed: Another medication with the same name was added. Make sure you understand how and when to take each.     * ondansetron ODT 4 MG disintegrating tablet  Commonly known as: ZOFRAN-ODT  Place 1 tablet on the tongue Every 4 (Four) Hours.  What changed: You were already taking a medication with the same name, and this prescription was added. Make sure you understand how and when to take each.           * This list has 2 medication(s) that are the same as other medications prescribed for you. Read the directions carefully, and ask your doctor or other care provider to review them with you.                Stop      cefuroxime 500 MG tablet  Commonly known as: CEFTIN     nitrofurantoin (macrocrystal-monohydrate) 100 MG capsule  Commonly known as: MACROBID               Where to Get Your Medications        These medications were sent to [facility] [id number] - [place], [place] - [address] [phone] [facility] [phone] FX  [address] [facility] [place] [address]      Phone: [phone]   meclizine 25 MG tablet  ondansetron ODT 4 MG disintegrating tablet            Kinjal Nunes PA-C  01/08/25 0248

## 2025-01-14 ENCOUNTER — TREATMENT (OUTPATIENT)
Dept: PHYSICAL THERAPY | Facility: CLINIC | Age: 45
End: 2025-01-14
Payer: MEDICARE

## 2025-01-14 DIAGNOSIS — R42 DIZZINESS: Primary | ICD-10-CM

## 2025-01-14 PROCEDURE — 97162 PT EVAL MOD COMPLEX 30 MIN: CPT | Performed by: PHYSICAL THERAPIST

## 2025-01-14 PROCEDURE — 97140 MANUAL THERAPY 1/> REGIONS: CPT | Performed by: PHYSICAL THERAPIST

## 2025-01-14 NOTE — PROGRESS NOTES
Physical Therapy Initial Evaluation and Plan of Care  [address]  [place] [address]    Patient: Delmi Glasgow   : 1980  Diagnosis/ICD-10 Code:  No primary diagnosis found.  Referring practitioner: Kinjal Nunes PA-C  Date of Initial Visit: 2025  Today's Date: 2025  Patient seen for Visit count could not be calculated. Make sure you are using a visit which is associated with an episode. session         Visit Diagnoses:  No diagnosis found.      Subjective Questionnaire: ABC: 59  DHI: 68      Subjective Evaluation    History of Present Illness  Mechanism of injury: Pt is a 44 year old female presenting to the clinic with dizziness. She had a brain tumor and they could not get it all. She had to have radiation to shrink the rest of the tumor because it was wrapped around the optic nerves. This was . After radiation, she started getting vomiting, lightheadedness, and dizziness. She has fallen several times. She uses a walker sometimes due to the dizziness. She cannot drive due to dizziness. She has had the epley maneuver done which has been effective for her. She has also had balance therapy. This kept her symptoms under control for a couple of years. She started getting dizzy again around 6 months ago. She has had 4 falls in the last month. Sometimes her dizziness will make her feel like her head is jerking. The dizziness lasts a few seconds to one min. Staying in one place relieves it. Bending over, walking, and turning her head too fast are all triggers. She gets right sided migraines that occur with the dizziness. It will effect her vision. She will see auras. She has at least one episode each day.      Patient Occupation: Disabled Quality of life: excellent    Pain  Aggravating factors: movement and repetitive movement  Progression: worsening    Patient Goals  Patient goals for therapy: improved balance, independence with ADLs/IADLs and return to  sport/leisure activities             Objective     See vestibular flowsheet for info.    Assessment & Plan       Assessment  Impairments: abnormal coordination, abnormal gait, activity intolerance, impaired balance, lacks appropriate home exercise program and safety issue   Functional limitations: moving in bed and stooping   Assessment details: Pt is a 44 year old female presenting to the clinic with dizziness and falls. She demonstrated positive right Logan Hallpike. She responded well to the Epley maneuver. She would like to continue PT for balance training. Pt would benefit from skilled PT services to improve her dizziness and balance.   Prognosis: good    Plan  Therapy options: will be seen for skilled therapy services  Planned modality interventions: cryotherapy, dry needling, electrical stimulation/Russian stimulation, high voltage pulsed current (pain management), TENS and thermotherapy (hydrocollator packs)  Planned therapy interventions: manual therapy, neuromuscular re-education, soft tissue mobilization, spinal/joint mobilization, strengthening, stretching, therapeutic activities, home exercise program and balance/weight-bearing training  Duration in visits: 1  Duration in weeks: 12        History # of Personal Factors and/or Comorbidities: MODERATE (1-2)  Examination of Body System(s): # of elements: MODERATE (3)  Clinical Presentation: EVOLVING  Clinical Decision Making: MODERATE      Timed:         Manual Therapy:    15     mins  78049;     Therapeutic Exercise:         mins  76233;     Neuromuscular Ling:        mins  46401;    Therapeutic Activity:          mins  57876;     Gait Training:           mins  66416;     Ultrasound:          mins  49139;    Ionto                                   mins   57324  Self Care                            mins   16515  Canalith Repos         mins 36080      Un-Timed:  Electrical Stimulation:         mins  07587 ( );  Dry Needling          mins  self-pay  Traction          mins 08387  Low Eval          Mins  73090  Mod Eval     30     Mins  98746  High Eval                            Mins  22193        Timed Treatment:   15   mins   Total Treatment:     45   mins          PT: Alesia Dominguez, GARRETT   License Number: [id number]  Electronically signed by Alesia Dominguez, PT, 01/14/25, 9:06 AM EST    Certification Period: 1/14/2025 thru 4/13/2025  I certify that the therapy services are furnished while this patient is under my care.  The services outlined above are required by this patient, and will be reviewed every 90 days.         Physician Signature:__________________________________________________    PHYSICIAN: Kinjal Nunes PA-C  NPI: [id number]                                      DATE:      Please sign and return via fax to [email or web address] . Thank you, [facility].

## 2025-01-15 LAB
QT INTERVAL: 366 MS
QTC INTERVAL: 467 MS

## 2025-01-16 ENCOUNTER — APPOINTMENT (OUTPATIENT)
Dept: GENERAL RADIOLOGY | Facility: HOSPITAL | Age: 45
End: 2025-01-16
Payer: MEDICARE

## 2025-01-16 ENCOUNTER — HOSPITAL ENCOUNTER (EMERGENCY)
Facility: HOSPITAL | Age: 45
Discharge: HOME | End: 2025-01-17
Attending: EMERGENCY MEDICINE
Payer: MEDICARE

## 2025-01-16 DIAGNOSIS — J68.0 CHEMICAL PNEUMONITIS: Primary | ICD-10-CM

## 2025-01-16 DIAGNOSIS — J20.9 ACUTE BRONCHITIS, UNSPECIFIED ORGANISM: ICD-10-CM

## 2025-01-16 DIAGNOSIS — R11.2 NAUSEA AND VOMITING, UNSPECIFIED VOMITING TYPE: ICD-10-CM

## 2025-01-16 PROCEDURE — 94640 AIRWAY INHALATION TREATMENT: CPT

## 2025-01-16 PROCEDURE — 71045 X-RAY EXAM CHEST 1 VIEW: CPT

## 2025-01-16 PROCEDURE — 93005 ELECTROCARDIOGRAM TRACING: CPT | Performed by: EMERGENCY MEDICINE

## 2025-01-16 PROCEDURE — 93005 ELECTROCARDIOGRAM TRACING: CPT

## 2025-01-16 PROCEDURE — 99283 EMERGENCY DEPT VISIT LOW MDM: CPT

## 2025-01-16 RX ORDER — ONDANSETRON 4 MG/1
4 TABLET, ORALLY DISINTEGRATING ORAL ONCE
Status: COMPLETED | OUTPATIENT
Start: 2025-01-16 | End: 2025-01-17

## 2025-01-16 RX ORDER — ALBUTEROL SULFATE 90 UG/1
2 INHALANT RESPIRATORY (INHALATION) EVERY 4 HOURS PRN
Qty: 8 G | Refills: 0 | Status: SHIPPED | OUTPATIENT
Start: 2025-01-16 | End: 2025-02-15

## 2025-01-16 RX ORDER — ALBUTEROL SULFATE 0.83 MG/ML
2.5 SOLUTION RESPIRATORY (INHALATION) ONCE
Status: COMPLETED | OUTPATIENT
Start: 2025-01-16 | End: 2025-01-16

## 2025-01-16 RX ADMIN — ALBUTEROL SULFATE 2.5 MG: 2.5 SOLUTION RESPIRATORY (INHALATION) at 23:48

## 2025-01-17 VITALS
DIASTOLIC BLOOD PRESSURE: 74 MMHG | SYSTOLIC BLOOD PRESSURE: 109 MMHG | BODY MASS INDEX: 44.65 KG/M2 | HEIGHT: 63 IN | TEMPERATURE: 98.2 F | HEART RATE: 105 BPM | WEIGHT: 252 LBS | OXYGEN SATURATION: 97 % | RESPIRATION RATE: 20 BRPM

## 2025-01-17 PROCEDURE — 63710000001 ONDANSETRON ODT 4 MG TABLET DISPERSIBLE: Performed by: EMERGENCY MEDICINE

## 2025-01-17 RX ADMIN — ONDANSETRON 4 MG: 4 TABLET, ORALLY DISINTEGRATING ORAL at 00:02

## 2025-01-17 NOTE — ED PROVIDER NOTES
Subjective   History of Present Illness  Is a 44-year-old female with a history of CHF and hypertension presenting to the emergency department with some cough, nausea and vomiting.  The patient states that she was in the bathroom when her  was cleaning with some lime away and [place]. patient states that she felt some burning in her chest when that occurred.  She started having a coughing spell.  She proceeded to vomit afterwards.  The patient is still complaining of some burning when she takes of breath.  She did not actually ingest or have any contact with any of the cleaning solutions when this occurred.  Patient is not having any hemoptysis.  No chest pain.  Denies any fevers or chills.  No headache or change in vision.  No focal weakness.  No abdominal pain or vomiting    History provided by:  Patient   used: No        Review of Systems   Constitutional:  Negative for chills and fever.   HENT:  Negative for congestion, ear pain and sore throat.    Eyes:  Negative for visual disturbance.   Respiratory:  Positive for cough. Negative for shortness of breath.    Cardiovascular:  Negative for chest pain.   Gastrointestinal:  Positive for nausea and vomiting. Negative for abdominal pain.   Genitourinary:  Negative for difficulty urinating.   Musculoskeletal:  Negative for arthralgias.   Skin:  Negative for rash.   Neurological:  Negative for dizziness, weakness and numbness.   Psychiatric/Behavioral:  Negative for agitation.        Past Medical History:   Diagnosis Date    CHF (congestive heart failure)     Depression     Elevated cholesterol     Hyperlipemia     Hypothyroid     Sleep apnea        Allergies   Allergen Reactions    Wellbutrin [Bupropion] Hives       Past Surgical History:   Procedure Laterality Date    BRAIN SURGERY      CARDIAC CATHETERIZATION N/A 8/10/2023    Procedure: Left Heart Cath;  Surgeon: Yury Barclay MD;  Location: [facility] CATH INVASIVE LOCATION;  Service:  Cardiology;  Laterality: N/A;    DILATION AND CURETTAGE, DIAGNOSTIC / THERAPEUTIC         Family History   Adopted: Yes   Problem Relation Age of Onset    No Known Problems Mother     No Known Problems Father     No Known Problems Maternal Grandmother     No Known Problems Maternal Grandfather     No Known Problems Paternal Grandmother     No Known Problems Paternal Grandfather        Social History     Socioeconomic History    Marital status:    Tobacco Use    Smoking status: Never     Passive exposure: Past    Smokeless tobacco: Never   Vaping Use    Vaping status: Never Used   Substance and Sexual Activity    Alcohol use: No    Drug use: No    Sexual activity: Yes     Partners: Male     Birth control/protection: Surgical           Objective   Physical Exam  Vitals and nursing note reviewed.   Constitutional:       General: She is not in acute distress.     Appearance: She is not ill-appearing or toxic-appearing.   HENT:      Mouth/Throat:      Pharynx: Oropharynx is clear. No posterior oropharyngeal erythema.   Eyes:      Conjunctiva/sclera: Conjunctivae normal.      Pupils: Pupils are equal, round, and reactive to light.   Cardiovascular:      Rate and Rhythm: Regular rhythm. Tachycardia present.   Pulmonary:      Effort: Pulmonary effort is normal. No respiratory distress.      Breath sounds: Wheezing present.   Abdominal:      General: Abdomen is flat. There is no distension.      Palpations: There is no mass.      Tenderness: There is no abdominal tenderness. There is no guarding or rebound.   Musculoskeletal:         General: No deformity. Normal range of motion.   Skin:     General: Skin is warm.      Findings: No rash.   Neurological:      General: No focal deficit present.      Mental Status: She is alert and oriented to person, place, and time.      Motor: No weakness.         ECG 12 Lead      Date/Time: 1/16/2025 11:05 PM    Performed by: Bashir Oshea MD  Authorized by: Bashir Oshea  MD NADYA  Interpreted by ED physician  Comparison: compared with previous ECG   Similar to previous ECG  Rhythm: sinus tachycardia  Rate: tachycardic  BPM: 102  QRS axis: normal  Conduction: conduction normal  Other findings comments: Nonspecific ST changes  Clinical impression: non-specific ECG  Comments: Interpretation:  EKG was directly visualized by myself, interpretations as documented in hospital course.                 ED Course  ED Course as of 01/16/25 2354   Thu Jan 16, 2025 2307 BP: 131/100 [JK]   2307 Temp: 98.2 °F (36.8 °C) [JK]   2307 Temp src: Oral [JK]   2307 Heart Rate: 103 [JK]   2307 Resp: 20 [JK]   2307 SpO2: 97 %  Interpretation:  Patient's repeat vitals, telemetry tracing, and pulse oximetry tracing were directly viewed and interpreted by myself.   O2 sat 97% on room air, interpreted as normal  Telemetry rhythm strip revealed a rate of 103 bpm, interpreted as sinus tachycardia [JK]   2307 XR Chest 1 View  Interpretation:  Imaging was directly visualized by myself, per my interpretations, chest x-ray was unremarkable. [JK]   2353 On reevaluation, patient is feeling better.  Wheezing is improved.  There is no respiratory distress.  Patient will follow-up with PCP in 48 hours.  Given strict return precautions.  Verbalized understanding. [JK]   2350 I had a discussion with the patient/family regarding diagnosis, diagnostic results, treatment plan, and medications. The patient/family indicated understanding of these instructions. I spent adequate time at the bedside prior to discharge necessary to discuss the aftercare instructions, giving patient education, providing explanations of the results of our evaluations/findings, and my decision making to assure that the patient/family understand the plan of care. Time was allotted to answer questions at that time and throughout the ED course. Patient is required to maintain timely follow up, as discussed. I also discussed the potential for the development  of an acute emergent condition requiring further evaluation, return to the ER, admission, or even surgical intervention.  I encouraged the patient to return to the emergency department immediately for any concerns, worsening symptoms, new complaints, or if symptoms persist and they are unable to seek follow-up in a timely fashion. The patient/family expressed understanding and agreement with this plan    Shared decision making:   After full review of the patient's clinical presentation, review of any work-up including but not limited to laboratory studies and radiology obtained, I had a discussion with the patient.  Treatment options were discussed as well as the risks, benefits and consequences.  I discussed all findings with the patient and family members if available.  During the discussion, treatment goals were understood by all as well as any misconceptions which were addressed with the patient.  Ample time was given for any questions they may have had.  They are in agreement with the treatment plan as well as final disposition. [JK]      ED Course User Index  [JK] Bashir Oshea MD                                                       Medical Decision Making  This is a 44-year-old female with a history hypertension presented to the emergency department with some cough and vomiting.  Patient symptoms seem consistent with pneumonitis secondary to inhalation of cleaning solutions.  Overall the patient appears nontoxic.  Afebrile.  No respiratory distress.  Patient be treated symptomatically.  Workup initiated.      Differential diagnosis: Pneumonitis, bronchitis, pneumonia, pleural effusion, chemical exposure    Amount and/or Complexity of Data Reviewed  External Data Reviewed: labs, radiology, ECG and notes.     Details: External laboratories, imaging as well as notes were reviewed personally by myself.  All relevant studies were used to guide decision making.     Date of previous record:  11/1/2024    Source of note: Cardiology    Summary:  Patient was seen and evaluated for routine visit.  I did review basic laboratory studies on file as well as a previous chest x-ray and EKG.  Records reviewed    Radiology: ordered and independent interpretation performed. Decision-making details documented in ED Course.  ECG/medicine tests: ordered and independent interpretation performed. Decision-making details documented in ED Course.  Discussion of management or test interpretation with external provider(s): Spoke with poison control regarding the case.  They recommended symptomatic treatment    Risk  Prescription drug management.        Final diagnoses:   Chemical pneumonitis   Acute bronchitis, unspecified organism   Nausea and vomiting, unspecified vomiting type       ED Disposition  ED Disposition       ED Disposition   Discharge    Condition   Stable    Comment   --               Yeison Garcia DO  [address]  [address]  [place] [id number] [phone]    Call in 1 day           Medication List        New Prescriptions      albuterol sulfate  (90 Base) MCG/ACT inhaler  Commonly known as: PROVENTIL HFA;VENTOLIN HFA;PROAIR HFA  Inhale 2 puffs Every 4 (Four) Hours As Needed for Wheezing for up to 30 days.               Where to Get Your Medications        These medications were sent to [facility] [id number] - [place], [place] - [address] [phone]  - [phone] FX  [address] [address]      Phone: [phone]   albuterol sulfate  (90 Base) MCG/ACT inhaler            Bashir Oshea MD  01/16/25 0655

## 2025-01-19 LAB
QT INTERVAL: 362 MS
QTC INTERVAL: 471 MS

## 2025-01-28 ENCOUNTER — TREATMENT (OUTPATIENT)
Dept: PHYSICAL THERAPY | Facility: CLINIC | Age: 45
End: 2025-01-28
Payer: MEDICARE

## 2025-01-28 DIAGNOSIS — R42 DIZZINESS: Primary | ICD-10-CM

## 2025-01-28 NOTE — PROGRESS NOTES
Physical Therapy Daily Treatment Note    [facility]   [address] [place] [address]      Patient: Delmi Glasgow   : 1980  Referring practitioner: Kinjal Nunes PA-C  Date of Initial Visit: Type: THERAPY  Noted: 2025  Today's Date: 2025  Patient seen for 2 sessions    Certification Period 2025 thru 2025       Visit Diagnoses:    ICD-10-CM ICD-9-CM   1. Dizziness  R42 780.4       Subjective     Pt reports she is doing much better since her first visit. She has not had anymore vertigo episodes.    Objective   See Exercise, Manual, and Modality Logs for complete treatment.       Assessment/Plan     Pt tolerated treatment well. She had min LOB with dynamic balance activities. She was given an HEP to practice at home. Pt would benefit from continued skilled PT.     Progress per plan of care          Timed:         Manual Therapy:         mins  79848;     Therapeutic Exercise:    23     mins  92306;     Neuromuscular Ling:    15    mins  56759;    Therapeutic Activity:          mins  88263;     Gait Training:           mins  06792;     Ultrasound:          mins  55832;    Ionto                                  mins   18154  Self Care                            mins   24452  Canalith Repos         mins 07116  Electrical Stimulation:         mins  81342    Un-Timed:  Electrical Stimulation:         mins  15426 (MC );  Dry Needling          mins self-pay  Traction          mins 35193      Timed Treatment:   38   mins   Total Treatment:     46   mins    Alesia Dominguez, PT, DPT  Physical Therapist  
No

## 2025-02-04 ENCOUNTER — TELEPHONE (OUTPATIENT)
Dept: PHYSICAL THERAPY | Facility: CLINIC | Age: 45
End: 2025-02-04

## 2025-02-04 NOTE — TELEPHONE ENCOUNTER
Caller: Delmi Glasgow    Relationship: Self         What was the call regarding:WEAK AND DIZZY TODAY CAN NOT DRIVE    .”

## 2025-02-13 ENCOUNTER — TREATMENT (OUTPATIENT)
Dept: PHYSICAL THERAPY | Facility: CLINIC | Age: 45
End: 2025-02-13
Payer: MEDICARE

## 2025-02-13 DIAGNOSIS — R42 DIZZINESS: Primary | ICD-10-CM

## 2025-02-13 NOTE — PROGRESS NOTES
Physical Therapy Daily Treatment Note    [facility]   [address], [address] [place] [address]      Patient: Delmi Glasgow   : 1980  Referring practitioner: Kinjal Nunes PA-C  Date of Initial Visit: Type: THERAPY  Noted: 2025  Today's Date: 2025  Patient seen for 3 sessions    Certification Period 2025 thru 2025       Visit Diagnoses:    ICD-10-CM ICD-9-CM   1. Dizziness  R42 780.4       Subjective     Pt reports she is not very dizzy today but the last 2 days she has felt very weak and fatigued. She has an appointment with [facility] next week.    Objective   See Exercise, Manual, and Modality Logs for complete treatment.       Assessment/Plan     Pt tolerated treatment with mod complaints of fatigue. She was not able to complete as many exercises this visit and required frequent rest. Pt would benefit from continued skilled PT.     Progress per plan of care          Timed:         Manual Therapy:         mins  45117;     Therapeutic Exercise:    23     mins  56429;     Neuromuscular Ling:    15    mins  78008;    Therapeutic Activity:          mins  73426;     Gait Training:           mins  24891;     Ultrasound:          mins  22053;    Ionto                                   mins   74694  Self Care                            mins   41389  Canalith Repos         mins 88982  Electrical Stimulation:         mins  11223    Un-Timed:  Electrical Stimulation:         mins  46097 ( );  Dry Needling          mins self-pay  Traction          mins 18089      Timed Treatment:   38   mins   Total Treatment:     47   mins    Alesia Dominguez, PT, DPT  Physical Therapist

## 2025-03-23 ENCOUNTER — APPOINTMENT (OUTPATIENT)
Dept: GENERAL RADIOLOGY | Facility: HOSPITAL | Age: 45
End: 2025-03-23
Payer: MEDICARE

## 2025-03-23 ENCOUNTER — HOSPITAL ENCOUNTER (EMERGENCY)
Facility: HOSPITAL | Age: 45
Discharge: HOME | End: 2025-03-23
Attending: EMERGENCY MEDICINE | Admitting: EMERGENCY MEDICINE
Payer: MEDICARE

## 2025-03-23 ENCOUNTER — APPOINTMENT (OUTPATIENT)
Dept: CT IMAGING | Facility: HOSPITAL | Age: 45
End: 2025-03-23
Payer: MEDICARE

## 2025-03-23 VITALS
RESPIRATION RATE: 20 BRPM | BODY MASS INDEX: 44.3 KG/M2 | SYSTOLIC BLOOD PRESSURE: 119 MMHG | OXYGEN SATURATION: 95 % | DIASTOLIC BLOOD PRESSURE: 89 MMHG | HEIGHT: 63 IN | TEMPERATURE: 98.2 F | WEIGHT: 250 LBS | HEART RATE: 95 BPM
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
## 2025-03-23 DIAGNOSIS — I49.3 VENTRICULAR ECTOPY: ICD-10-CM

## 2025-03-23 DIAGNOSIS — G44.319 ACUTE POST-TRAUMATIC HEADACHE, NOT INTRACTABLE: ICD-10-CM

## 2025-03-23 DIAGNOSIS — R07.9 NONSPECIFIC CHEST PAIN: Primary | ICD-10-CM

## 2025-03-23 LAB
ALBUMIN SERPL-MCNC: 3.9 G/DL (ref 3.5–5.2)
ALBUMIN/GLOB SERPL: 1.6 G/DL
ALP SERPL-CCNC: 160 U/L (ref 39–117)
ALT SERPL W P-5'-P-CCNC: 26 U/L (ref 1–33)
ANION GAP SERPL CALCULATED.3IONS-SCNC: 13 MMOL/L (ref 5–15)
AST SERPL-CCNC: 21 U/L (ref 1–32)
BACTERIA UR QL AUTO: ABNORMAL /HPF
BASOPHILS # BLD AUTO: 0.07 10*3/MM3 (ref 0–0.2)
BASOPHILS NFR BLD AUTO: 0.7 % (ref 0–1.5)
BILIRUB SERPL-MCNC: 0.2 MG/DL (ref 0–1.2)
BILIRUB UR QL STRIP: NEGATIVE
BUN SERPL-MCNC: 10 MG/DL (ref 6–20)
BUN/CREAT SERPL: 17.9 (ref 7–25)
CALCIUM SPEC-SCNC: 9.5 MG/DL (ref 8.6–10.5)
CHLORIDE SERPL-SCNC: 105 MMOL/L (ref 98–107)
CLARITY UR: CLEAR
CO2 SERPL-SCNC: 24 MMOL/L (ref 22–29)
COLOR UR: YELLOW
CREAT SERPL-MCNC: 0.56 MG/DL (ref 0.57–1)
DEPRECATED RDW RBC AUTO: 43.7 FL (ref 37–54)
EGFRCR SERPLBLD CKD-EPI 2021: 113.4 ML/MIN/1.73
EOSINOPHIL # BLD AUTO: 0.11 10*3/MM3 (ref 0–0.4)
EOSINOPHIL NFR BLD AUTO: 1.1 % (ref 0.3–6.2)
ERYTHROCYTE [DISTWIDTH] IN BLOOD BY AUTOMATED COUNT: 13.7 % (ref 12.3–15.4)
GEN 5 1HR TROPONIN T REFLEX: <6 NG/L
GLOBULIN UR ELPH-MCNC: 2.5 GM/DL
GLUCOSE SERPL-MCNC: 94 MG/DL (ref 65–99)
GLUCOSE UR STRIP-MCNC: NEGATIVE MG/DL
HCT VFR BLD AUTO: 40 % (ref 34–46.6)
HGB BLD-MCNC: 12.8 G/DL (ref 12–15.9)
HGB UR QL STRIP.AUTO: NEGATIVE
HOLD SPECIMEN: NORMAL
HYALINE CASTS UR QL AUTO: ABNORMAL /LPF
IMM GRANULOCYTES # BLD AUTO: 0.03 10*3/MM3 (ref 0–0.05)
IMM GRANULOCYTES NFR BLD AUTO: 0.3 % (ref 0–0.5)
KETONES UR QL STRIP: NEGATIVE
LEUKOCYTE ESTERASE UR QL STRIP.AUTO: ABNORMAL
LIPASE SERPL-CCNC: 32 U/L (ref 13–60)
LYMPHOCYTES # BLD AUTO: 3.28 10*3/MM3 (ref 0.7–3.1)
LYMPHOCYTES NFR BLD AUTO: 33.8 % (ref 19.6–45.3)
MAGNESIUM SERPL-MCNC: 1.8 MG/DL (ref 1.6–2.6)
MCH RBC QN AUTO: 27.9 PG (ref 26.6–33)
MCHC RBC AUTO-ENTMCNC: 32 G/DL (ref 31.5–35.7)
MCV RBC AUTO: 87.3 FL (ref 79–97)
MONOCYTES # BLD AUTO: 0.69 10*3/MM3 (ref 0.1–0.9)
MONOCYTES NFR BLD AUTO: 7.1 % (ref 5–12)
NEUTROPHILS NFR BLD AUTO: 5.53 10*3/MM3 (ref 1.7–7)
NEUTROPHILS NFR BLD AUTO: 57 % (ref 42.7–76)
NITRITE UR QL STRIP: NEGATIVE
NRBC BLD AUTO-RTO: 0 /100 WBC (ref 0–0.2)
NT-PROBNP SERPL-MCNC: <36 PG/ML (ref 0–450)
PH UR STRIP.AUTO: 6 [PH] (ref 5–8)
PLATELET # BLD AUTO: 357 10*3/MM3 (ref 140–450)
PMV BLD AUTO: 10.1 FL (ref 6–12)
POTASSIUM SERPL-SCNC: 3.9 MMOL/L (ref 3.5–5.2)
PROT SERPL-MCNC: 6.4 G/DL (ref 6–8.5)
PROT UR QL STRIP: NEGATIVE
RBC # BLD AUTO: 4.58 10*6/MM3 (ref 3.77–5.28)
RBC # UR STRIP: ABNORMAL /HPF
REF LAB TEST METHOD: ABNORMAL
SODIUM SERPL-SCNC: 142 MMOL/L (ref 136–145)
SP GR UR STRIP: 1.01 (ref 1–1.03)
SQUAMOUS #/AREA URNS HPF: ABNORMAL /HPF
TROPONIN T NUMERIC DELTA: NORMAL
TROPONIN T SERPL HS-MCNC: <6 NG/L
UROBILINOGEN UR QL STRIP: ABNORMAL
WBC # UR STRIP: ABNORMAL /HPF
WBC NRBC COR # BLD AUTO: 9.71 10*3/MM3 (ref 3.4–10.8)
WHOLE BLOOD HOLD COAG: NORMAL
WHOLE BLOOD HOLD SPECIMEN: NORMAL

## 2025-03-23 PROCEDURE — 83735 ASSAY OF MAGNESIUM: CPT

## 2025-03-23 PROCEDURE — 93005 ELECTROCARDIOGRAM TRACING: CPT | Performed by: EMERGENCY MEDICINE

## 2025-03-23 PROCEDURE — 83690 ASSAY OF LIPASE: CPT | Performed by: EMERGENCY MEDICINE

## 2025-03-23 PROCEDURE — 80053 COMPREHEN METABOLIC PANEL: CPT | Performed by: EMERGENCY MEDICINE

## 2025-03-23 PROCEDURE — 71045 X-RAY EXAM CHEST 1 VIEW: CPT

## 2025-03-23 PROCEDURE — 84484 ASSAY OF TROPONIN QUANT: CPT | Performed by: EMERGENCY MEDICINE

## 2025-03-23 PROCEDURE — 83880 ASSAY OF NATRIURETIC PEPTIDE: CPT | Performed by: EMERGENCY MEDICINE

## 2025-03-23 PROCEDURE — 73560 X-RAY EXAM OF KNEE 1 OR 2: CPT

## 2025-03-23 PROCEDURE — 81001 URINALYSIS AUTO W/SCOPE: CPT

## 2025-03-23 PROCEDURE — 70450 CT HEAD/BRAIN W/O DYE: CPT

## 2025-03-23 PROCEDURE — 85025 COMPLETE CBC W/AUTO DIFF WBC: CPT | Performed by: EMERGENCY MEDICINE

## 2025-03-23 PROCEDURE — 96374 THER/PROPH/DIAG INJ IV PUSH: CPT

## 2025-03-23 PROCEDURE — 36415 COLL VENOUS BLD VENIPUNCTURE: CPT

## 2025-03-23 PROCEDURE — 99284 EMERGENCY DEPT VISIT MOD MDM: CPT

## 2025-03-23 PROCEDURE — 25010000002 KETOROLAC TROMETHAMINE PER 15 MG

## 2025-03-23 RX ORDER — SODIUM CHLORIDE 0.9 % (FLUSH) 0.9 %
10 SYRINGE (ML) INJECTION AS NEEDED
Status: DISCONTINUED | OUTPATIENT
Start: 2025-03-23 | End: 2025-03-24 | Stop reason: HOSPADM

## 2025-03-23 RX ORDER — KETOROLAC TROMETHAMINE 15 MG/ML
15 INJECTION, SOLUTION INTRAMUSCULAR; INTRAVENOUS ONCE
Status: COMPLETED | OUTPATIENT
Start: 2025-03-23 | End: 2025-03-23

## 2025-03-23 RX ORDER — ASPIRIN 81 MG/1
324 TABLET, CHEWABLE ORAL ONCE
Status: COMPLETED | OUTPATIENT
Start: 2025-03-23 | End: 2025-03-23

## 2025-03-23 RX ORDER — NITROGLYCERIN 0.4 MG/1
0.4 TABLET SUBLINGUAL
Status: DISCONTINUED | OUTPATIENT
Start: 2025-03-23 | End: 2025-03-24 | Stop reason: HOSPADM

## 2025-03-23 RX ADMIN — KETOROLAC TROMETHAMINE 15 MG: 15 INJECTION, SOLUTION INTRAMUSCULAR; INTRAVENOUS at 22:46

## 2025-03-23 RX ADMIN — NITROGLYCERIN 0.4 MG: 0.4 TABLET SUBLINGUAL at 21:28

## 2025-03-23 RX ADMIN — ASPIRIN 324 MG: 81 TABLET, CHEWABLE ORAL at 21:28

## 2025-03-24 NOTE — ED PROVIDER NOTES
Subjective   History of Present Illness patient is a 45-year-old female accompanied by her , who presents complaining of onset of chest pain radiating to the back and left arm, as well as a fall at home this evening around 6:30 PM.  Patient reports the chest pain began at that time and was mild, as she was ambulating to her living room, her legs gave out and she went down falling and hitting the left side of her head, with left knee pain.  She reports ongoing issues with weakness and falls subsequent to pituitary tumor and resection reported in January 2022.  She reports she follows with [facility] with concern for POTS syndrome, with scheduled follow-up in the near future.  Patient denied loss of consciousness, reports her chest pain is 8 out of 10 at this time, she is awake, alert, nontoxic-appearing, without diaphoresis or increased work of breathing.  She has no obvious external traumatic injury.  She ambulated from her 's vehicle using a walker into the ED    Review of Systems   Respiratory: Negative.     Cardiovascular:  Positive for chest pain.   Gastrointestinal: Negative.    Musculoskeletal:  Positive for arthralgias.   Skin: Negative.    Neurological:  Positive for dizziness, weakness and headaches.       Past Medical History:   Diagnosis Date    CHF (congestive heart failure)     Depression     Elevated cholesterol     Hyperlipemia     Hypothyroid     Sleep apnea        Allergies   Allergen Reactions    Wellbutrin [Bupropion] Hives       Past Surgical History:   Procedure Laterality Date    BRAIN SURGERY      CARDIAC CATHETERIZATION N/A 8/10/2023    Procedure: Left Heart Cath;  Surgeon: Yury Barclay MD;  Location: [facility] CATH INVASIVE LOCATION;  Service: Cardiology;  Laterality: N/A;    DILATION AND CURETTAGE, DIAGNOSTIC / THERAPEUTIC         Family History   Adopted: Yes   Problem Relation Age of Onset    No Known Problems Mother     No Known Problems Father     No Known Problems  Maternal Grandmother     No Known Problems Maternal Grandfather     No Known Problems Paternal Grandmother     No Known Problems Paternal Grandfather        Social History     Socioeconomic History    Marital status:    Tobacco Use    Smoking status: Never     Passive exposure: Past    Smokeless tobacco: Never   Vaping Use    Vaping status: Never Used   Substance and Sexual Activity    Alcohol use: No    Drug use: No    Sexual activity: Yes     Partners: Male     Birth control/protection: Surgical           Objective   Physical Exam  Constitutional:       General: She is not in acute distress.     Appearance: She is well-developed. She is obese. She is not toxic-appearing.   HENT:      Head: Normocephalic and atraumatic.   Eyes:      Extraocular Movements: Extraocular movements intact.      Pupils: Pupils are equal, round, and reactive to light.   Neck:      Vascular: No JVD.   Cardiovascular:      Rate and Rhythm: Normal rate.      Heart sounds: Normal heart sounds.   Pulmonary:      Effort: Pulmonary effort is normal.      Breath sounds: Normal breath sounds.   Chest:      Chest wall: No mass or tenderness.   Abdominal:      General: Bowel sounds are normal.      Palpations: Abdomen is soft.   Musculoskeletal:         General: Normal range of motion.      Cervical back: Normal range of motion and neck supple.      Right lower leg: No edema.      Left lower leg: Tenderness present. No edema.   Lymphadenopathy:      Cervical: No cervical adenopathy.   Skin:     General: Skin is warm and dry.      Capillary Refill: Capillary refill takes less than 2 seconds.   Neurological:      General: No focal deficit present.      Mental Status: She is alert and oriented to person, place, and time.      GCS: GCS eye subscore is 4. GCS verbal subscore is 5. GCS motor subscore is 6.      Cranial Nerves: Cranial nerves 2-12 are intact.      Sensory: Sensation is intact.      Motor: Motor function is intact. No weakness.       Coordination: Coordination is intact.   Psychiatric:         Attention and Perception: Attention normal.         Mood and Affect: Affect is flat.         Behavior: Behavior normal. Behavior is cooperative.         Cognition and Memory: Cognition and memory normal.         Procedures           ED Course  ED Course as of 03/23/25 2307   Sun Mar 23, 2025   2027 Initial evaluation the patient ED bed 22, accompanied by her . []   2112 CBC and serum chemistry nonactionable.  Initial cardiac troponin is negative, however we will need a repeat given the onset of patient's symptoms. [JH]   2112 Plain film image of the chest without consolidation, effusion or opacity or other acute abnormality in my interpretation [JH]   2155 CT imaging study without intercranial abnormality, plain film of the left knee without acute bony abnormality, there is mild to moderate degenerative arthritic changes without joint effusion.  Urinalysis nonactionable. [JH]   2219 Cardiac troponin remained flat and negative. [JH]   2300 Patient's orthostatic vital signs reviewed, without hypotension.  Headache reported improved.  Patient contacting has been for a ride, and proceed to discharge.  As I reevaluated her for a second time and discussed I will refer her to the chest pain clinic, recommending a Holter monitor be placed. [JH]      ED Course User Index  [JH] Antwon Forbes, MALIKA                                                       Medical Decision Making  The patient's complaints, the reported mechanism of injury at ground-level fall at home earlier this evening, her prior history, my exam, differential cannot exclude syncopal episode, vasovagal episode, orthostatic hypotension, as well as bony deformity although less likely of the knee, blunt head trauma, intracranial abnormality.  Patient could also have etiologic concern for urinary tract infection, renal insufficiency, electrolyte derangement, and myocardial ischemia of the less  likely.  Patient will have serum screen labs, urinalysis complaint of itching the chest, left knee, CT imaging of the head.  We will continue cardiovascular telemetry monitoring, administer empiric aspirin, nitroglycerin and monitor for response.  Patient is agreeable with this plan.    Problems Addressed:  Accident due to mechanical fall without injury, initial encounter: complicated acute illness or injury  Acute post-traumatic headache, not intractable: complicated acute illness or injury  Nonspecific chest pain: complicated acute illness or injury  Ventricular ectopy: complicated acute illness or injury    Amount and/or Complexity of Data Reviewed  Labs: ordered.  Radiology: ordered.  ECG/medicine tests: ordered.    Risk  OTC drugs.  Prescription drug management.        Final diagnoses:   Nonspecific chest pain   Accident due to mechanical fall without injury, initial encounter   Acute post-traumatic headache, not intractable   Ventricular ectopy       ED Disposition  ED Disposition       ED Disposition   Discharge    Condition   Stable    Comment   --               Yeison Garcia,   [address]  [address]  [facility] [address] [phone]      As needed    Yury Barclay MD  [address]  [facility] [address]  [phone]          [facility] CARDIOLOGY  [address]  [facility] [id number] [phone]  Call       [facility]  POTS workup  Call            Medication List      No changes were made to your prescriptions during this visit.            Antwon Forbes, APRN  03/24/25 0041

## 2025-03-26 LAB
QT INTERVAL: 368 MS
QT INTERVAL: 382 MS
QTC INTERVAL: 447 MS
QTC INTERVAL: 480 MS

## 2025-03-27 ENCOUNTER — APPOINTMENT (OUTPATIENT)
Dept: GENERAL RADIOLOGY | Facility: HOSPITAL | Age: 45
End: 2025-03-27
Payer: MEDICARE

## 2025-03-27 ENCOUNTER — APPOINTMENT (OUTPATIENT)
Dept: CT IMAGING | Facility: HOSPITAL | Age: 45
End: 2025-03-27
Payer: MEDICARE

## 2025-03-27 ENCOUNTER — HOSPITAL ENCOUNTER (EMERGENCY)
Facility: HOSPITAL | Age: 45
Discharge: HOME | End: 2025-03-28
Attending: EMERGENCY MEDICINE
Payer: MEDICARE

## 2025-03-27 DIAGNOSIS — R07.9 NONSPECIFIC CHEST PAIN: Primary | ICD-10-CM

## 2025-03-27 DIAGNOSIS — I10 ESSENTIAL HYPERTENSION: ICD-10-CM

## 2025-03-27 DIAGNOSIS — E78.5 DYSLIPIDEMIA: ICD-10-CM

## 2025-03-27 LAB
ALBUMIN SERPL-MCNC: 3.7 G/DL (ref 3.5–5.2)
ALBUMIN/GLOB SERPL: 1.5 G/DL
ALP SERPL-CCNC: 151 U/L (ref 39–117)
ALT SERPL W P-5'-P-CCNC: 23 U/L (ref 1–33)
ANION GAP SERPL CALCULATED.3IONS-SCNC: 10 MMOL/L (ref 5–15)
AST SERPL-CCNC: 22 U/L (ref 1–32)
BASOPHILS # BLD AUTO: 0.05 10*3/MM3 (ref 0–0.2)
BASOPHILS NFR BLD AUTO: 0.8 % (ref 0–1.5)
BILIRUB SERPL-MCNC: 0.2 MG/DL (ref 0–1.2)
BUN SERPL-MCNC: 6 MG/DL (ref 6–20)
BUN/CREAT SERPL: 10.5 (ref 7–25)
CALCIUM SPEC-SCNC: 9.3 MG/DL (ref 8.6–10.5)
CHLORIDE SERPL-SCNC: 104 MMOL/L (ref 98–107)
CO2 SERPL-SCNC: 25 MMOL/L (ref 22–29)
CREAT SERPL-MCNC: 0.57 MG/DL (ref 0.57–1)
DEPRECATED RDW RBC AUTO: 43 FL (ref 37–54)
EGFRCR SERPLBLD CKD-EPI 2021: 113 ML/MIN/1.73
EOSINOPHIL # BLD AUTO: 0.15 10*3/MM3 (ref 0–0.4)
EOSINOPHIL NFR BLD AUTO: 2.4 % (ref 0.3–6.2)
ERYTHROCYTE [DISTWIDTH] IN BLOOD BY AUTOMATED COUNT: 13.6 % (ref 12.3–15.4)
GEN 5 1HR TROPONIN T REFLEX: <6 NG/L
GLOBULIN UR ELPH-MCNC: 2.4 GM/DL
GLUCOSE SERPL-MCNC: 109 MG/DL (ref 65–99)
HCT VFR BLD AUTO: 37.1 % (ref 34–46.6)
HGB BLD-MCNC: 12.1 G/DL (ref 12–15.9)
HOLD SPECIMEN: NORMAL
IMM GRANULOCYTES # BLD AUTO: 0.02 10*3/MM3 (ref 0–0.05)
IMM GRANULOCYTES NFR BLD AUTO: 0.3 % (ref 0–0.5)
LIPASE SERPL-CCNC: 27 U/L (ref 13–60)
LYMPHOCYTES # BLD AUTO: 2.6 10*3/MM3 (ref 0.7–3.1)
LYMPHOCYTES NFR BLD AUTO: 41.1 % (ref 19.6–45.3)
MCH RBC QN AUTO: 28.4 PG (ref 26.6–33)
MCHC RBC AUTO-ENTMCNC: 32.6 G/DL (ref 31.5–35.7)
MCV RBC AUTO: 87.1 FL (ref 79–97)
MONOCYTES # BLD AUTO: 0.44 10*3/MM3 (ref 0.1–0.9)
MONOCYTES NFR BLD AUTO: 7 % (ref 5–12)
NEUTROPHILS NFR BLD AUTO: 3.06 10*3/MM3 (ref 1.7–7)
NEUTROPHILS NFR BLD AUTO: 48.4 % (ref 42.7–76)
NRBC BLD AUTO-RTO: 0 /100 WBC (ref 0–0.2)
NT-PROBNP SERPL-MCNC: 202.2 PG/ML (ref 0–450)
PLATELET # BLD AUTO: 309 10*3/MM3 (ref 140–450)
PMV BLD AUTO: 9.9 FL (ref 6–12)
POTASSIUM SERPL-SCNC: 3.7 MMOL/L (ref 3.5–5.2)
PROT SERPL-MCNC: 6.1 G/DL (ref 6–8.5)
QT INTERVAL: 388 MS
QTC INTERVAL: 469 MS
RBC # BLD AUTO: 4.26 10*6/MM3 (ref 3.77–5.28)
SODIUM SERPL-SCNC: 139 MMOL/L (ref 136–145)
TROPONIN T NUMERIC DELTA: NORMAL
TROPONIN T SERPL HS-MCNC: 6 NG/L
WBC NRBC COR # BLD AUTO: 6.32 10*3/MM3 (ref 3.4–10.8)
WHOLE BLOOD HOLD COAG: NORMAL
WHOLE BLOOD HOLD SPECIMEN: NORMAL

## 2025-03-27 PROCEDURE — 96374 THER/PROPH/DIAG INJ IV PUSH: CPT

## 2025-03-27 PROCEDURE — 83880 ASSAY OF NATRIURETIC PEPTIDE: CPT

## 2025-03-27 PROCEDURE — 25010000002 MORPHINE PER 10 MG: Performed by: EMERGENCY MEDICINE

## 2025-03-27 PROCEDURE — 93005 ELECTROCARDIOGRAM TRACING: CPT | Performed by: EMERGENCY MEDICINE

## 2025-03-27 PROCEDURE — 80053 COMPREHEN METABOLIC PANEL: CPT

## 2025-03-27 PROCEDURE — 70450 CT HEAD/BRAIN W/O DYE: CPT

## 2025-03-27 PROCEDURE — 99284 EMERGENCY DEPT VISIT MOD MDM: CPT

## 2025-03-27 PROCEDURE — 96375 TX/PRO/DX INJ NEW DRUG ADDON: CPT

## 2025-03-27 PROCEDURE — 84484 ASSAY OF TROPONIN QUANT: CPT | Performed by: EMERGENCY MEDICINE

## 2025-03-27 PROCEDURE — 93005 ELECTROCARDIOGRAM TRACING: CPT

## 2025-03-27 PROCEDURE — 85025 COMPLETE CBC W/AUTO DIFF WBC: CPT

## 2025-03-27 PROCEDURE — 83690 ASSAY OF LIPASE: CPT

## 2025-03-27 PROCEDURE — 71045 X-RAY EXAM CHEST 1 VIEW: CPT

## 2025-03-27 PROCEDURE — 25010000002 ONDANSETRON PER 1 MG: Performed by: EMERGENCY MEDICINE

## 2025-03-27 PROCEDURE — 36415 COLL VENOUS BLD VENIPUNCTURE: CPT

## 2025-03-27 PROCEDURE — 84484 ASSAY OF TROPONIN QUANT: CPT

## 2025-03-27 RX ORDER — ASPIRIN 81 MG/1
324 TABLET, CHEWABLE ORAL ONCE
Status: COMPLETED | OUTPATIENT
Start: 2025-03-27 | End: 2025-03-27

## 2025-03-27 RX ORDER — ONDANSETRON 2 MG/ML
4 INJECTION INTRAMUSCULAR; INTRAVENOUS ONCE
Status: COMPLETED | OUTPATIENT
Start: 2025-03-27 | End: 2025-03-27

## 2025-03-27 RX ORDER — SODIUM CHLORIDE 0.9 % (FLUSH) 0.9 %
10 SYRINGE (ML) INJECTION AS NEEDED
Status: DISCONTINUED | OUTPATIENT
Start: 2025-03-27 | End: 2025-03-28 | Stop reason: HOSPADM

## 2025-03-27 RX ORDER — MORPHINE SULFATE 4 MG/ML
4 INJECTION, SOLUTION INTRAMUSCULAR; INTRAVENOUS ONCE
Status: COMPLETED | OUTPATIENT
Start: 2025-03-27 | End: 2025-03-27

## 2025-03-27 RX ADMIN — ONDANSETRON 4 MG: 2 INJECTION INTRAMUSCULAR; INTRAVENOUS at 23:11

## 2025-03-27 RX ADMIN — ASPIRIN 324 MG: 81 TABLET, CHEWABLE ORAL at 21:17

## 2025-03-27 RX ADMIN — MORPHINE SULFATE 4 MG: 4 INJECTION, SOLUTION INTRAMUSCULAR; INTRAVENOUS at 23:11

## 2025-03-28 VITALS
HEIGHT: 63 IN | HEART RATE: 82 BPM | RESPIRATION RATE: 20 BRPM | BODY MASS INDEX: 44.65 KG/M2 | SYSTOLIC BLOOD PRESSURE: 131 MMHG | DIASTOLIC BLOOD PRESSURE: 89 MMHG | TEMPERATURE: 98.4 F | WEIGHT: 252 LBS | OXYGEN SATURATION: 96 %

## 2025-03-28 NOTE — ED PROVIDER NOTES
Subjective   History of Present Illness  This is a 45-year-old female with history of CHF and dyslipidemia presenting to the emergency department some chest discomfort.  Patient has had the symptoms for the last 1 week.  She was seen here previously and set up her cardiology appointment.  However she did not maintain that appointment today.  She states that she is still having pain the left side of her chest.  Is dull in nature.  Radiating to her left arm.  Constant.  She denies any associate shortness of breath.  No cough.  No hemoptysis.  No headache or change in vision.  No focal weakness.  No abdominal pain or vomiting    History provided by:  Patient and relative   used: No        Review of Systems   Constitutional:  Negative for chills and fever.   HENT:  Negative for congestion, ear pain and sore throat.    Eyes:  Negative for visual disturbance.   Respiratory:  Negative for shortness of breath.    Cardiovascular:  Positive for chest pain.   Gastrointestinal:  Negative for abdominal pain.   Genitourinary:  Negative for difficulty urinating.   Musculoskeletal:  Negative for arthralgias.   Skin:  Negative for rash.   Neurological:  Negative for dizziness, weakness and numbness.   Psychiatric/Behavioral:  Negative for agitation.        Past Medical History:   Diagnosis Date    CHF (congestive heart failure)     Depression     Elevated cholesterol     Hyperlipemia     Hypothyroid     Sleep apnea        Allergies   Allergen Reactions    Wellbutrin [Bupropion] Hives       Past Surgical History:   Procedure Laterality Date    BRAIN SURGERY      CARDIAC CATHETERIZATION N/A 8/10/2023    Procedure: Left Heart Cath;  Surgeon: Yury Barclay MD;  Location: [facility] CATH INVASIVE LOCATION;  Service: Cardiology;  Laterality: N/A;    DILATION AND CURETTAGE, DIAGNOSTIC / THERAPEUTIC         Family History   Adopted: Yes   Problem Relation Age of Onset    No Known Problems Mother     No Known Problems Father      No Known Problems Maternal Grandmother     No Known Problems Maternal Grandfather     No Known Problems Paternal Grandmother     No Known Problems Paternal Grandfather        Social History     Socioeconomic History    Marital status:    Tobacco Use    Smoking status: Never     Passive exposure: Past    Smokeless tobacco: Never   Vaping Use    Vaping status: Never Used   Substance and Sexual Activity    Alcohol use: No    Drug use: No    Sexual activity: Yes     Partners: Male     Birth control/protection: Surgical           Objective   Physical Exam  Vitals and nursing note reviewed.   Constitutional:       General: She is not in acute distress.     Appearance: She is not ill-appearing or toxic-appearing.   HENT:      Mouth/Throat:      Pharynx: No posterior oropharyngeal erythema.   Eyes:      Conjunctiva/sclera: Conjunctivae normal.      Pupils: Pupils are equal, round, and reactive to light.   Cardiovascular:      Rate and Rhythm: Normal rate and regular rhythm.   Pulmonary:      Effort: Pulmonary effort is normal. No respiratory distress.   Abdominal:      General: Abdomen is flat. There is no distension.      Palpations: There is no mass.      Tenderness: There is no abdominal tenderness. There is no guarding or rebound.   Musculoskeletal:         General: No deformity. Normal range of motion.   Skin:     General: Skin is warm.      Findings: No rash.   Neurological:      General: No focal deficit present.      Mental Status: She is alert and oriented to person, place, and time.      Motor: No weakness.         ECG 12 Lead Chest Pain      Date/Time: 3/27/2025 10:29 PM    Performed by: Bashir Oshea MD  Authorized by: Justo Youssef MD  Interpreted by ED physician  Comparison: compared with previous ECG   Similar to previous ECG  Rhythm: sinus rhythm  Rate: normal  BPM: 88  QRS axis: normal  Conduction: conduction normal  Other findings: LVH  Clinical impression: non-specific ECG  Comments:  Interpretation:  EKG was directly visualized by myself, interpretations as documented in hospital course.    ECG 12 Lead      Date/Time: 3/27/2025 11:21 PM    Performed by: Bashir Oshea MD  Authorized by: Bashir Oshea MD  Interpreted by ED physician  Comparison: compared with previous ECG   Similar to previous ECG  Rhythm: sinus rhythm  Rate: normal  BPM: 64  QRS axis: normal  Conduction: conduction normal  ST Segments: ST segments normal  Other: no other findings  Clinical impression: normal ECG  Comments: Interpretation:  EKG was directly visualized by myself, interpretations as documented in hospital course.               ED Course  ED Course as of 03/28/25 0013   Thu Mar 27, 2025   2231 BP: 130/87 [JK]   2231 Temp: 98.4 °F (36.9 °C) [JK]   2231 Temp src: Oral [JK]   2231 Heart Rate: 88 [JK]   2231 Resp: 20 [JK]   2231 SpO2: 97 %  Interpretation:  Patient's repeat vitals, telemetry tracing, and pulse oximetry tracing were directly viewed and interpreted by myself.   O2 sat 97% on room air, interpreted as normal.  Telemetry rhythm strip revealed a rate of 88 bpm, interpreted as normal sinus rhythm [JK]   2231 Nursing staff informed me that the patient fell while in the bathroom.  This was unwitnessed.  States that she did hit her head.  No loss conscious.  Imaging will be obtained [JK]   2231 CBC & Differential [JK]   2231 Lipase [JK]   2231 BNP [JK]   2231 High Sensitivity Troponin T [JK]   2231 Comprehensive Metabolic Panel(!)  Interpretation:  Laboratory studies were reviewed and interpreted directly by myself.  CMP, troponin, BNP, lipase, CBC unremarkable [JK]   Fri Mar 28, 2025   0009 XR Chest 1 View [JK]   0009 CT Head Without Contrast  Interpretation:  Imaging was directly visualized by myself, per my interpretations, chest x-ray showed some atelectasis.  CT of the head unremarkable. [JK]   0009 On reevaluation, the patient is feeling much better.  Vital signs have improved.  Overall they  are well-appearing.  There were no abnormal cardiopulmonary findings.  No respiratory distress.  Abdomen soft, nontender and no evidence of acute abdomen.  Story is minimally concerning for ACS, PE or dissection given the atypical nature, risk factors, history and physical examination.  Patient's heart score places the patient at low risk for acute coronary syndrome.  Patient feels comfortable following up with her primary care physician and/or primary cardiologist.  Patient was given strict return precautions.  Verbalized understanding. [JK]   0002 I had a discussion with the patient/family regarding diagnosis, diagnostic results, treatment plan, and medications. The patient/family indicated understanding of these instructions. I spent adequate time at the bedside prior to discharge necessary to discuss the aftercare instructions, giving patient education, providing explanations of the results of our evaluations/findings, and my decision making to assure that the patient/family understand the plan of care. Time was allotted to answer questions at that time and throughout the ED course. Patient is required to maintain timely follow up, as discussed. I also discussed the potential for the development of an acute emergent condition requiring further evaluation, return to the ER, admission, or even surgical intervention.  I encouraged the patient to return to the emergency department immediately for any concerns, worsening symptoms, new complaints, or if symptoms persist and they are unable to seek follow-up in a timely fashion. The patient/family expressed understanding and agreement with this plan    Shared decision making:   After full review of the patient's clinical presentation, review of any work-up including but not limited to laboratory studies and radiology obtained, I had a discussion with the patient.  Treatment options were discussed as well as the risks, benefits and consequences.  I discussed all  findings with the patient and family members if available.  During the discussion, treatment goals were understood by all as well as any misconceptions which were addressed with the patient.  Ample time was given for any questions they may have had.  They are in agreement with the treatment plan as well as final disposition. [JK]      ED Course User Index  [JK] Bashir Oshea MD                  HEART Score: 2                                      Medical Decision Making  This is a 45-year-old female with a history of CHF and dyslipidemia presenting with some chest discomfort.  This seems atypical in nature.  Subacute.  Patient does have risk factors in which ACS is a concern.  EKG is nonspecific.  Overall, the patient is nontoxic.  Afebrile.  IV access was established in the patient.  Placed on continuous telemetry monitoring.  Given the patient's presentation, differential is broad and will require further evaluation.  Workup initiated.      Differential diagnosis: CAD, ACS, peptic ulcer disease, dyspepsia, pneumonia, bronchitis, atypical chest pain, angina, musculoskeletal pain      Amount and/or Complexity of Data Reviewed  Independent Historian: spouse  External Data Reviewed: labs, radiology, ECG and notes.     Details: External laboratories, imaging as well as notes were reviewed personally by myself.  All relevant studies were used to guide decision making.     Date of previous record: 2/18/2025    Source of note: Neurology    Summary:  Patient was seen and evaluated for routine visit.  I did review basic laboratory studies on file as well as a previous chest x-ray and EKG.  Records reviewed    Labs: ordered. Decision-making details documented in ED Course.  Radiology: ordered and independent interpretation performed. Decision-making details documented in ED Course.  ECG/medicine tests: ordered and independent interpretation performed. Decision-making details documented in ED  Course.    Risk  Prescription drug management.        Final diagnoses:   Nonspecific chest pain   Dyslipidemia   Essential hypertension       ED Disposition  ED Disposition       ED Disposition   Discharge    Condition   Stable    Comment   --               [facility] CARDIOLOGY  [address]  [address]  [facility] [address] [phone]  Call in 1 day           Medication List      No changes were made to your prescriptions during this visit.            Bashir Oshea MD  03/28/25 0013

## 2025-03-30 LAB
QT INTERVAL: 450 MS
QT INTERVAL: 452 MS
QTC INTERVAL: 464 MS
QTC INTERVAL: 491 MS

## 2025-04-02 ENCOUNTER — OFFICE VISIT (OUTPATIENT)
Dept: CARDIOLOGY | Facility: HOSPITAL | Age: 45
End: 2025-04-02
Payer: MEDICARE

## 2025-04-02 ENCOUNTER — HOSPITAL ENCOUNTER (OUTPATIENT)
Dept: CARDIOLOGY | Facility: HOSPITAL | Age: 45
Discharge: HOME | End: 2025-04-02
Payer: MEDICARE

## 2025-04-02 VITALS
WEIGHT: 257 LBS | OXYGEN SATURATION: 98 % | HEIGHT: 63 IN | DIASTOLIC BLOOD PRESSURE: 79 MMHG | RESPIRATION RATE: 18 BRPM | BODY MASS INDEX: 45.54 KG/M2 | HEART RATE: 88 BPM | SYSTOLIC BLOOD PRESSURE: 122 MMHG

## 2025-04-02 DIAGNOSIS — I10 ESSENTIAL HYPERTENSION: ICD-10-CM

## 2025-04-02 DIAGNOSIS — R00.2 PALPITATIONS: ICD-10-CM

## 2025-04-02 DIAGNOSIS — R06.09 DOE (DYSPNEA ON EXERTION): ICD-10-CM

## 2025-04-02 DIAGNOSIS — I50.22 HEART FAILURE WITH MILDLY REDUCED EJECTION FRACTION (HFMREF): Primary | ICD-10-CM

## 2025-04-02 DIAGNOSIS — R42 DIZZINESS: ICD-10-CM

## 2025-04-02 DIAGNOSIS — R07.89 OTHER CHEST PAIN: ICD-10-CM

## 2025-04-02 PROCEDURE — 93246 EXT ECG>7D<15D RECORDING: CPT

## 2025-04-02 RX ORDER — LACTULOSE 10 G/15ML
20 SOLUTION ORAL; RECTAL
COMMUNITY
Start: 2024-12-16 | End: 2025-12-16

## 2025-04-02 RX ORDER — LAMOTRIGINE 25 MG/1
TABLET ORAL
COMMUNITY
Start: 2025-03-27

## 2025-04-02 RX ORDER — BUTALBITAL, ASPIRIN, AND CAFFEINE 325; 50; 40 MG/1; MG/1; MG/1
CAPSULE ORAL
COMMUNITY
Start: 2024-12-30

## 2025-04-02 RX ORDER — BUSPIRONE HYDROCHLORIDE 5 MG/1
TABLET ORAL
COMMUNITY
Start: 2025-01-23

## 2025-04-02 RX ORDER — NARATRIPTAN 2.5 MG/1
2.5 TABLET ORAL
COMMUNITY
Start: 2025-02-18

## 2025-04-02 NOTE — PROGRESS NOTES
[facility] Heart Monitor Documentation    Delmi Glasgow  1980  [id number]  04/02/25      [] ZIO XT Patch  Model [id number] Prescribed for N/A Days    Serial Number: (N + 9 Digits) N   Apply-By Date on Box:   USPS Tracking Number:   USPS Tracking        [] [employer] MINI PLUS Mobile Cardiac Telemetry  Model BGMINIPLUS Prescribed for N/A Days    Serial Number: (BGM + 7 Digits) [id number]  Shipped-By Date on Box: 993569  UPS Tracking Number: [id number]  UPS Tracking      [] [employer] MINI Holter Monitor  Model BGMINIEL Prescribed for N/A Days    Serial Number: (7 Digits)   Shipped-By Date on Box:   UPS Tracking Number: [id number]  UPS Tracking        This monitor was applied to the patient's chest and checked for proper functioning.  Ms. Delmi Glasgow was instructed in the proper use of this monitor.  She was given the opportunity to ask questions and left the office with the device 's instruction manual.    Susan Mcbride MA, 15:55 EDT, 04/02/25                  [facility] 8.8.2019

## 2025-04-03 ENCOUNTER — HOSPITAL ENCOUNTER (OUTPATIENT)
Dept: CARDIOLOGY | Facility: HOSPITAL | Age: 45
Discharge: HOME | End: 2025-04-03
Admitting: NURSE PRACTITIONER
Payer: MEDICARE

## 2025-04-03 VITALS
WEIGHT: 257 LBS | SYSTOLIC BLOOD PRESSURE: 131 MMHG | DIASTOLIC BLOOD PRESSURE: 87 MMHG | HEIGHT: 63 IN | BODY MASS INDEX: 45.54 KG/M2

## 2025-04-03 DIAGNOSIS — R06.09 DOE (DYSPNEA ON EXERTION): ICD-10-CM

## 2025-04-03 DIAGNOSIS — R00.2 PALPITATIONS: ICD-10-CM

## 2025-04-03 DIAGNOSIS — I50.22 HEART FAILURE WITH MILDLY REDUCED EJECTION FRACTION (HFMREF): ICD-10-CM

## 2025-04-03 DIAGNOSIS — I10 ESSENTIAL HYPERTENSION: ICD-10-CM

## 2025-04-03 DIAGNOSIS — R07.89 OTHER CHEST PAIN: ICD-10-CM

## 2025-04-03 PROCEDURE — 93306 TTE W/DOPPLER COMPLETE: CPT

## 2025-04-04 NOTE — PROGRESS NOTES
"Chief Complaint  Chest Pain and Establish Care    Subjective    History of Present Illness {CC  Problem List  Visit  Diagnosis   Encounters  Notes  Medications  Labs  Result Review Imaging  Media :23}       History of Present Illness   45-year-old female presents the office today for ongoing evaluation of her palpitations and chest pain. Patient presented to [facility] 3/27/2025 with chest discomfort.  Pain is located in the left side of her chest and she reports that usually happens with palpitations.  Pain is dull in nature and does not worsen with exertion.  She reports palpitations are occurring on a regular basis she often feels her heart racing.  She does not have any presyncope or syncope.  Does report shortness of air, intermittent dizziness. does take as needed meclizine.  Troponins were within normal limits .  History of dyslipidemia, palpitations, hypertension, acquired hypothyroidism, GERD, depression, heart failure with mildly reduced EF, sleep apnea, seizures.  Cardiac cath August 2023 with Dr. Moyer showed normal coronary arteries with an EF of 65%  Objective     Vital Signs:   Vitals:    04/02/25 1511 04/02/25 1512   BP: 123/79 122/79   BP Location: Left arm Left arm   Patient Position: Standing Sitting   Cuff Size: Adult Adult   Pulse: 94 88   Resp:  18   SpO2: 98% 98%   Weight:  117 kg (257 lb)   Height:  160 cm (63\")     Body mass index is 45.53 kg/m².  Physical Exam  Vitals and nursing note reviewed.   Constitutional:       Appearance: Normal appearance.   HENT:      Head: Normocephalic.   Eyes:      Pupils: Pupils are equal, round, and reactive to light.   Cardiovascular:      Rate and Rhythm: Normal rate and regular rhythm.      Pulses: Normal pulses.      Heart sounds: Normal heart sounds. No murmur heard.  Pulmonary:      Effort: Pulmonary effort is normal.      Breath sounds: Normal breath sounds.   Abdominal:      General: Bowel sounds are normal.      Palpations: " Abdomen is soft.   Musculoskeletal:         General: Normal range of motion.      Cervical back: Normal range of motion.      Right lower leg: No edema.      Left lower leg: No edema.   Skin:     General: Skin is warm and dry.      Capillary Refill: Capillary refill takes less than 2 seconds.   Neurological:      Mental Status: She is alert and oriented to person, place, and time.   Psychiatric:         Mood and Affect: Mood normal.         Thought Content: Thought content normal.              Result Review  Data Reviewed:{ Labs  Result Review  Imaging  Med Tab  Media :23}   ECG 12 Lead Chest Pain (03/27/2025 20:59)  ECG 12 Lead Chest Pain (03/27/2025 23:16)  ECG 12 Lead Rhythm Change (03/27/2025 23:42)  Adult Transthoracic Echo Complete W/ Cont if Necessary Per Protocol (08/10/2023 10:28)  Cardiac Catheterization/Vascular Study (08/10/2023 17:49)  ECHO BI-V OPTIMIZATION (03/21/2024 07:42)           Assessment and Plan {CC Problem List  Visit Diagnosis  ROS  Review (Popup)  Health Maintenance  Quality  BestPractice  Medications  SmartSets  SnapShot Encounters  Media :23}   1. Heart failure with mildly reduced ejection fraction (HFmrEF)  Previous echo March 2024 [facility] EF 46 to 50%.  - Adult Transthoracic Echo Complete W/ Cont if Necessary Per Protocol; Future  Stable on lisinopril  2. Other chest pain  Left heart cath August 2023 showed normal coronaries; will defer stress testing at this time  - Adult Transthoracic Echo Complete W/ Cont if Necessary Per Protocol; Future    3. WYLIE (dyspnea on exertion)    - Adult Transthoracic Echo Complete W/ Cont if Necessary Per Protocol; Future    4. Essential hypertension  Lisinopril recently discontinued by neurology.    Patient to monitor BP closely    - Adult Transthoracic Echo Complete W/ Cont if Necessary Per Protocol; Future    5. Palpitations    - Adult Transthoracic Echo Complete W/ Cont if Necessary Per Protocol; Future  - Holter Monitor - 72  Hour Up To 15 Days; Future    6. Dizziness    Encouraged good hydration  - Holter Monitor - 72 Hour Up To 15 Days; Future          Follow Up {Instructions Charge Capture  Follow-up Communications :23}   Return in about 1 month (around 5/2/2025) for Office visit, Monitor results.    Patient was given instructions and counseling regarding her condition or for health maintenance advice. Please see specific information pulled into the AVS if appropriate.  Patient was instructed to call the [facility] with any questions, concerns, or worsening symptoms.

## 2025-04-07 ENCOUNTER — APPOINTMENT (OUTPATIENT)
Dept: GENERAL RADIOLOGY | Facility: HOSPITAL | Age: 45
End: 2025-04-07
Payer: MEDICARE

## 2025-04-07 ENCOUNTER — HOSPITAL ENCOUNTER (EMERGENCY)
Facility: HOSPITAL | Age: 45
Discharge: HOME | End: 2025-04-07
Attending: EMERGENCY MEDICINE | Admitting: EMERGENCY MEDICINE
Payer: MEDICARE

## 2025-04-07 ENCOUNTER — APPOINTMENT (OUTPATIENT)
Dept: ULTRASOUND IMAGING | Facility: HOSPITAL | Age: 45
End: 2025-04-07
Payer: MEDICARE

## 2025-04-07 VITALS
TEMPERATURE: 98.7 F | BODY MASS INDEX: 44.65 KG/M2 | WEIGHT: 252 LBS | HEART RATE: 102 BPM | HEIGHT: 63 IN | SYSTOLIC BLOOD PRESSURE: 135 MMHG | DIASTOLIC BLOOD PRESSURE: 62 MMHG | RESPIRATION RATE: 18 BRPM | OXYGEN SATURATION: 97 %

## 2025-04-07 DIAGNOSIS — R07.9 CHEST PAIN, UNSPECIFIED TYPE: Primary | ICD-10-CM

## 2025-04-07 DIAGNOSIS — K80.20 CALCULUS OF GALLBLADDER WITHOUT CHOLECYSTITIS WITHOUT OBSTRUCTION: ICD-10-CM

## 2025-04-07 LAB
ALBUMIN SERPL-MCNC: 4.1 G/DL (ref 3.5–5.2)
ALBUMIN/GLOB SERPL: 1.6 G/DL
ALP SERPL-CCNC: 181 U/L (ref 39–117)
ALT SERPL W P-5'-P-CCNC: 35 U/L (ref 1–33)
ANION GAP SERPL CALCULATED.3IONS-SCNC: 9 MMOL/L (ref 5–15)
AORTIC DIMENSIONLESS INDEX: 0.81 (DI)
ASCENDING AORTA: 3.3 CM
AST SERPL-CCNC: 26 U/L (ref 1–32)
AV MEAN PRESS GRAD SYS DOP V1V2: 2 MMHG
AV VMAX SYS DOP: 100.6 CM/SEC
BASOPHILS # BLD AUTO: 0.08 10*3/MM3 (ref 0–0.2)
BASOPHILS NFR BLD AUTO: 0.8 % (ref 0–1.5)
BH CV ECHO MEAS - AO MAX PG: 4 MMHG
BH CV ECHO MEAS - AO ROOT DIAM: 3.5 CM
BH CV ECHO MEAS - AO V2 VTI: 19.8 CM
BH CV ECHO MEAS - AVA(I,D): 3.1 CM2
BH CV ECHO MEAS - EDV(CUBED): 140.6 ML
BH CV ECHO MEAS - EDV(MOD-SP2): 63.3 ML
BH CV ECHO MEAS - EDV(MOD-SP4): 171 ML
BH CV ECHO MEAS - EF(MOD-SP2): 51.7 %
BH CV ECHO MEAS - EF(MOD-SP4): 57 %
BH CV ECHO MEAS - ESV(CUBED): 54.9 ML
BH CV ECHO MEAS - ESV(MOD-SP2): 30.6 ML
BH CV ECHO MEAS - ESV(MOD-SP4): 73.5 ML
BH CV ECHO MEAS - FS: 26.9 %
BH CV ECHO MEAS - IVS/LVPW: 1 CM
BH CV ECHO MEAS - IVSD: 1 CM
BH CV ECHO MEAS - LA DIMENSION: 3.4 CM
BH CV ECHO MEAS - LAT PEAK E' VEL: 10 CM/SEC
BH CV ECHO MEAS - LV MASS(C)D: 194.2 GRAMS
BH CV ECHO MEAS - LV MAX PG: 2.33 MMHG
BH CV ECHO MEAS - LV MEAN PG: 1 MMHG
BH CV ECHO MEAS - LV V1 MAX: 76.3 CM/SEC
BH CV ECHO MEAS - LV V1 VTI: 16.1 CM
BH CV ECHO MEAS - LVIDD: 5.2 CM
BH CV ECHO MEAS - LVIDS: 3.8 CM
BH CV ECHO MEAS - LVOT AREA: 3.8 CM2
BH CV ECHO MEAS - LVOT DIAM: 2.2 CM
BH CV ECHO MEAS - LVPWD: 1 CM
BH CV ECHO MEAS - MED PEAK E' VEL: 8.5 CM/SEC
BH CV ECHO MEAS - MV A MAX VEL: 61.3 CM/SEC
BH CV ECHO MEAS - MV DEC SLOPE: 619 CM/SEC2
BH CV ECHO MEAS - MV DEC TIME: 0.16 SEC
BH CV ECHO MEAS - MV E MAX VEL: 85.4 CM/SEC
BH CV ECHO MEAS - MV E/A: 1.39
BH CV ECHO MEAS - MV MAX PG: 3.4 MMHG
BH CV ECHO MEAS - MV MEAN PG: 2 MMHG
BH CV ECHO MEAS - MV P1/2T: 39 MSEC
BH CV ECHO MEAS - MV V2 VTI: 18.7 CM
BH CV ECHO MEAS - MVA(P1/2T): 5.6 CM2
BH CV ECHO MEAS - MVA(VTI): 3.3 CM2
BH CV ECHO MEAS - PA ACC TIME: 0.07 SEC
BH CV ECHO MEAS - PA V2 MAX: 77 CM/SEC
BH CV ECHO MEAS - RAP SYSTOLE: 3 MMHG
BH CV ECHO MEAS - RVSP: 23 MMHG
BH CV ECHO MEAS - SV(LVOT): 61 ML
BH CV ECHO MEAS - SV(MOD-SP2): 32.7 ML
BH CV ECHO MEAS - SV(MOD-SP4): 97.5 ML
BH CV ECHO MEAS - TAPSE (>1.6): 2.47 CM
BH CV ECHO MEAS - TR MAX PG: 20.4 MMHG
BH CV ECHO MEAS - TR MAX VEL: 226 CM/SEC
BH CV ECHO MEASUREMENTS AVERAGE E/E' RATIO: 9.23
BH CV VAS BP LEFT ARM: NORMAL MMHG
BH CV XLRA - RV BASE: 4 CM
BH CV XLRA - RV LENGTH: 6.5 CM
BH CV XLRA - RV MID: 3 CM
BH CV XLRA - TDI S': 15.4 CM/SEC
BILIRUB SERPL-MCNC: 0.3 MG/DL (ref 0–1.2)
BUN SERPL-MCNC: 12 MG/DL (ref 6–20)
BUN/CREAT SERPL: 16.9 (ref 7–25)
CALCIUM SPEC-SCNC: 9.6 MG/DL (ref 8.6–10.5)
CHLORIDE SERPL-SCNC: 105 MMOL/L (ref 98–107)
CO2 SERPL-SCNC: 26 MMOL/L (ref 22–29)
CREAT SERPL-MCNC: 0.71 MG/DL (ref 0.57–1)
D DIMER PPP FEU-MCNC: 0.29 MCGFEU/ML (ref 0–0.5)
DEPRECATED RDW RBC AUTO: 47.1 FL (ref 37–54)
EGFRCR SERPLBLD CKD-EPI 2021: 105.7 ML/MIN/1.73
EOSINOPHIL # BLD AUTO: 0.19 10*3/MM3 (ref 0–0.4)
EOSINOPHIL NFR BLD AUTO: 1.8 % (ref 0.3–6.2)
ERYTHROCYTE [DISTWIDTH] IN BLOOD BY AUTOMATED COUNT: 14.6 % (ref 12.3–15.4)
GEN 5 1HR TROPONIN T REFLEX: <6 NG/L
GLOBULIN UR ELPH-MCNC: 2.5 GM/DL
GLUCOSE SERPL-MCNC: 99 MG/DL (ref 65–99)
HCT VFR BLD AUTO: 41.9 % (ref 34–46.6)
HGB BLD-MCNC: 13.1 G/DL (ref 12–15.9)
HOLD SPECIMEN: NORMAL
IMM GRANULOCYTES # BLD AUTO: 0.08 10*3/MM3 (ref 0–0.05)
IMM GRANULOCYTES NFR BLD AUTO: 0.8 % (ref 0–0.5)
IVRT: 134 MS
LEFT ATRIUM VOLUME INDEX: 18.1 ML/M2
LIPASE SERPL-CCNC: 44 U/L (ref 13–60)
LV EF 2D ECHO EST: 55 %
LV EF BIPLANE MOD: 53.4 %
LYMPHOCYTES # BLD AUTO: 3.87 10*3/MM3 (ref 0.7–3.1)
LYMPHOCYTES NFR BLD AUTO: 37.3 % (ref 19.6–45.3)
MCH RBC QN AUTO: 28.1 PG (ref 26.6–33)
MCHC RBC AUTO-ENTMCNC: 31.3 G/DL (ref 31.5–35.7)
MCV RBC AUTO: 89.7 FL (ref 79–97)
MONOCYTES # BLD AUTO: 0.59 10*3/MM3 (ref 0.1–0.9)
MONOCYTES NFR BLD AUTO: 5.7 % (ref 5–12)
NEUTROPHILS NFR BLD AUTO: 5.56 10*3/MM3 (ref 1.7–7)
NEUTROPHILS NFR BLD AUTO: 53.6 % (ref 42.7–76)
NRBC BLD AUTO-RTO: 0 /100 WBC (ref 0–0.2)
NT-PROBNP SERPL-MCNC: <36 PG/ML (ref 0–450)
PLATELET # BLD AUTO: 375 10*3/MM3 (ref 140–450)
PMV BLD AUTO: 9.7 FL (ref 6–12)
POTASSIUM SERPL-SCNC: 4.1 MMOL/L (ref 3.5–5.2)
PROT SERPL-MCNC: 6.6 G/DL (ref 6–8.5)
RBC # BLD AUTO: 4.67 10*6/MM3 (ref 3.77–5.28)
SODIUM SERPL-SCNC: 140 MMOL/L (ref 136–145)
TROPONIN T NUMERIC DELTA: NORMAL
TROPONIN T SERPL HS-MCNC: <6 NG/L
WBC NRBC COR # BLD AUTO: 10.37 10*3/MM3 (ref 3.4–10.8)
WHOLE BLOOD HOLD COAG: NORMAL
WHOLE BLOOD HOLD SPECIMEN: NORMAL

## 2025-04-07 PROCEDURE — 76705 ECHO EXAM OF ABDOMEN: CPT

## 2025-04-07 PROCEDURE — 25010000002 DEXAMETHASONE PER 1 MG: Performed by: EMERGENCY MEDICINE

## 2025-04-07 PROCEDURE — 71045 X-RAY EXAM CHEST 1 VIEW: CPT

## 2025-04-07 PROCEDURE — 99284 EMERGENCY DEPT VISIT MOD MDM: CPT

## 2025-04-07 PROCEDURE — 96374 THER/PROPH/DIAG INJ IV PUSH: CPT

## 2025-04-07 PROCEDURE — 85025 COMPLETE CBC W/AUTO DIFF WBC: CPT | Performed by: EMERGENCY MEDICINE

## 2025-04-07 PROCEDURE — 84484 ASSAY OF TROPONIN QUANT: CPT | Performed by: EMERGENCY MEDICINE

## 2025-04-07 PROCEDURE — 36415 COLL VENOUS BLD VENIPUNCTURE: CPT

## 2025-04-07 PROCEDURE — 80053 COMPREHEN METABOLIC PANEL: CPT | Performed by: EMERGENCY MEDICINE

## 2025-04-07 PROCEDURE — 83690 ASSAY OF LIPASE: CPT | Performed by: EMERGENCY MEDICINE

## 2025-04-07 PROCEDURE — 85379 FIBRIN DEGRADATION QUANT: CPT | Performed by: EMERGENCY MEDICINE

## 2025-04-07 PROCEDURE — 93005 ELECTROCARDIOGRAM TRACING: CPT

## 2025-04-07 PROCEDURE — 83880 ASSAY OF NATRIURETIC PEPTIDE: CPT | Performed by: EMERGENCY MEDICINE

## 2025-04-07 PROCEDURE — 93005 ELECTROCARDIOGRAM TRACING: CPT | Performed by: EMERGENCY MEDICINE

## 2025-04-07 RX ORDER — ASPIRIN 81 MG/1
324 TABLET, CHEWABLE ORAL ONCE
Status: COMPLETED | OUTPATIENT
Start: 2025-04-07 | End: 2025-04-07

## 2025-04-07 RX ORDER — PANTOPRAZOLE SODIUM 40 MG/1
40 TABLET, DELAYED RELEASE ORAL ONCE
Status: COMPLETED | OUTPATIENT
Start: 2025-04-07 | End: 2025-04-07

## 2025-04-07 RX ORDER — DEXAMETHASONE SODIUM PHOSPHATE 4 MG/ML
4 INJECTION, SOLUTION INTRA-ARTICULAR; INTRALESIONAL; INTRAMUSCULAR; INTRAVENOUS; SOFT TISSUE ONCE
Status: COMPLETED | OUTPATIENT
Start: 2025-04-07 | End: 2025-04-07

## 2025-04-07 RX ORDER — SODIUM CHLORIDE 0.9 % (FLUSH) 0.9 %
10 SYRINGE (ML) INJECTION AS NEEDED
Status: DISCONTINUED | OUTPATIENT
Start: 2025-04-07 | End: 2025-04-07 | Stop reason: HOSPADM

## 2025-04-07 RX ORDER — ALUMINA, MAGNESIA, AND SIMETHICONE 2400; 2400; 240 MG/30ML; MG/30ML; MG/30ML
15 SUSPENSION ORAL ONCE
Status: COMPLETED | OUTPATIENT
Start: 2025-04-07 | End: 2025-04-07

## 2025-04-07 RX ORDER — CYCLOBENZAPRINE HCL 10 MG
10 TABLET ORAL 3 TIMES DAILY PRN
Qty: 15 TABLET | Refills: 0 | Status: SHIPPED | OUTPATIENT
Start: 2025-04-07

## 2025-04-07 RX ORDER — CYCLOBENZAPRINE HCL 10 MG
10 TABLET ORAL ONCE
Status: COMPLETED | OUTPATIENT
Start: 2025-04-07 | End: 2025-04-07

## 2025-04-07 RX ADMIN — PANTOPRAZOLE SODIUM 40 MG: 40 TABLET, DELAYED RELEASE ORAL at 06:42

## 2025-04-07 RX ADMIN — ASPIRIN 324 MG: 81 TABLET, CHEWABLE ORAL at 05:52

## 2025-04-07 RX ADMIN — DEXAMETHASONE SODIUM PHOSPHATE 4 MG: 4 INJECTION INTRA-ARTICULAR; INTRALESIONAL; INTRAMUSCULAR; INTRAVENOUS; SOFT TISSUE at 06:42

## 2025-04-07 RX ADMIN — CYCLOBENZAPRINE HYDROCHLORIDE 10 MG: 10 TABLET, FILM COATED ORAL at 06:41

## 2025-04-07 RX ADMIN — ALUMINUM HYDROXIDE, MAGNESIUM HYDROXIDE, AND DIMETHICONE 15 ML: 400; 400; 40 SUSPENSION ORAL at 06:41

## 2025-04-07 NOTE — ED PROVIDER NOTES
Subjective   History of Present Illness  Mrs. Glasgow presents with chest pain.  It woke her up from sleep at 4:00 this morning.  Worse with deep breathing and movement of her upper body.  No shortness of breath or nausea.  Has been continuous.  She took Aleve and so far is not helping.  She tells me she is currently wearing a heart monitor.  She has had multiple visits to the emergency department for chest pain.  Had heart cath in August 2023 by Dr. Barclay and had normal coronary arteries and normal ejection fraction.      Review of Systems    Past Medical History:   Diagnosis Date    CHF (congestive heart failure)     Depression     Elevated cholesterol     Hyperlipemia     Hypothyroid     Sleep apnea        Allergies   Allergen Reactions    Wellbutrin [Bupropion] Hives       Past Surgical History:   Procedure Laterality Date    BRAIN SURGERY      CARDIAC CATHETERIZATION N/A 8/10/2023    Procedure: Left Heart Cath;  Surgeon: Yury Barclay MD;  Location: [facility] CATH INVASIVE LOCATION;  Service: Cardiology;  Laterality: N/A;    DILATION AND CURETTAGE, DIAGNOSTIC / THERAPEUTIC         Family History   Adopted: Yes   Problem Relation Age of Onset    No Known Problems Mother     Dementia Father     No Known Problems Maternal Grandmother     No Known Problems Maternal Grandfather     No Known Problems Paternal Grandmother     No Known Problems Paternal Grandfather        Social History     Socioeconomic History    Marital status:    Tobacco Use    Smoking status: Never     Passive exposure: Past    Smokeless tobacco: Never   Vaping Use    Vaping status: Never Used   Substance and Sexual Activity    Alcohol use: No    Drug use: No    Sexual activity: Yes     Partners: Male     Birth control/protection: Surgical           Objective   Physical Exam  Vitals and nursing note reviewed.   Constitutional:       General: She is not in acute distress.     Appearance: Normal appearance.   HENT:      Head: Normocephalic and  atraumatic.      Nose: Nose normal. No congestion or rhinorrhea.   Eyes:      General: No scleral icterus.     Conjunctiva/sclera: Conjunctivae normal.   Neck:      Comments: No JVD   Cardiovascular:      Rate and Rhythm: Normal rate and regular rhythm.      Heart sounds: No murmur heard.     No friction rub.   Pulmonary:      Effort: Pulmonary effort is normal.      Breath sounds: Normal breath sounds. No wheezing or rales.   Abdominal:      General: Bowel sounds are normal.      Palpations: Abdomen is soft.      Tenderness: There is no abdominal tenderness. There is no guarding or rebound.   Musculoskeletal:         General: No tenderness.      Cervical back: Normal range of motion and neck supple.      Right lower leg: No edema.      Left lower leg: No edema.      Comments: She has no tenderness but indicates pain when she sits up in bed.  Also indicates pain with deep breathing.   Skin:     General: Skin is warm and dry.      Coloration: Skin is not pale.      Findings: No erythema.   Neurological:      General: No focal deficit present.      Mental Status: She is alert.      Motor: No weakness.      Coordination: Coordination normal.   Psychiatric:         Mood and Affect: Mood normal.         Behavior: Behavior normal.         Thought Content: Thought content normal.         Procedures           ED Course  ED Course as of 04/07/25 0850   Mon Apr 07, 2025   0617 EKG nonspecific.  Reviewed records as above. [DT]   0658 Continues to appear comfortable.  Tells me the Maalox has not helped any.  Labs unremarkable although alk phos and ALT a little bit higher than previous.  Will obtain ultrasound of her gallbladder.  Awaiting second troponin. [DT]   0825 Tells me she feels a little bit better.  Will prescribe Flexeril for use at home, will schedule HIDA scan [DT]      ED Course User Index  [DT] Maykel Monson MD                  HEART Score: 3                                      Medical Decision Making  Reviewed  and interpreted prior records including heart catheterization from August 2023 and several other cardiac tests.  Ordered and interpreted multiple labs.  Ordered and interpreted results of chest x-ray and ultrasound.  Had reevaluation and discussion    Problems Addressed:  Calculus of gallbladder without cholecystitis without obstruction: chronic illness or injury  Chest pain, unspecified type: complicated acute illness or injury that poses a threat to life or bodily functions    Amount and/or Complexity of Data Reviewed  Labs: ordered. Decision-making details documented in ED Course.  Radiology: ordered. Decision-making details documented in ED Course.  ECG/medicine tests: ordered. Decision-making details documented in ED Course.    Risk  OTC drugs.  Prescription drug management.        Final diagnoses:   Chest pain, unspecified type   Calculus of gallbladder without cholecystitis without obstruction       ED Disposition  ED Disposition       ED Disposition   Discharge    Condition   Stable    Comment   --               Yeison Garcia DO  [address] [facility]  [address]  [place] [address]  [phone]               Medication List        New Prescriptions      cyclobenzaprine 10 MG tablet  Commonly known as: FLEXERIL  Take 1 tablet by mouth 3 (Three) Times a Day As Needed (Back pain).               Where to Get Your Medications        These medications were [facility] to [id number] - [place], [place] - [address] [place] [address] [phone] [facility] [phone] FX  [address] [facility] [address]      Phone: [phone]   cyclobenzaprine 10 MG tablet            Maykel Monson MD  04/07/25 7888

## 2025-04-07 NOTE — DISCHARGE INSTRUCTIONS
Only take Aleve on a full stomach.  Call your primary care provider today and arrange follow-up for further evaluation.  Return if any concerns.  I have sent in a prescription for a muscle relaxer.

## 2025-04-08 LAB
QT INTERVAL: 414 MS
QT INTERVAL: 416 MS
QTC INTERVAL: 474 MS
QTC INTERVAL: 480 MS

## 2025-05-01 ENCOUNTER — APPOINTMENT (OUTPATIENT)
Dept: GENERAL RADIOLOGY | Facility: HOSPITAL | Age: 45
End: 2025-05-01
Payer: MEDICARE

## 2025-05-01 ENCOUNTER — APPOINTMENT (OUTPATIENT)
Dept: CT IMAGING | Facility: HOSPITAL | Age: 45
End: 2025-05-01
Payer: MEDICARE

## 2025-05-01 ENCOUNTER — HOSPITAL ENCOUNTER (EMERGENCY)
Facility: HOSPITAL | Age: 45
Discharge: HOME | End: 2025-05-01
Attending: EMERGENCY MEDICINE
Payer: MEDICARE

## 2025-05-01 VITALS
OXYGEN SATURATION: 98 % | WEIGHT: 250 LBS | TEMPERATURE: 98.1 F | SYSTOLIC BLOOD PRESSURE: 130 MMHG | HEIGHT: 63 IN | HEART RATE: 95 BPM | RESPIRATION RATE: 18 BRPM | DIASTOLIC BLOOD PRESSURE: 85 MMHG | BODY MASS INDEX: 44.3 KG/M2

## 2025-05-01 DIAGNOSIS — S70.02XA CONTUSION OF LEFT HIP, INITIAL ENCOUNTER: Primary | ICD-10-CM

## 2025-05-01 DIAGNOSIS — S43.402A SPRAIN OF LEFT SHOULDER, UNSPECIFIED SHOULDER SPRAIN TYPE, INITIAL ENCOUNTER: ICD-10-CM

## 2025-05-01 DIAGNOSIS — S13.9XXA NECK SPRAIN, INITIAL ENCOUNTER: ICD-10-CM

## 2025-05-01 LAB
ALBUMIN SERPL-MCNC: 4.1 G/DL (ref 3.5–5.2)
ALBUMIN/GLOB SERPL: 1.6 G/DL
ALP SERPL-CCNC: 159 U/L (ref 39–117)
ALT SERPL W P-5'-P-CCNC: 26 U/L (ref 1–33)
ANION GAP SERPL CALCULATED.3IONS-SCNC: 11 MMOL/L (ref 5–15)
AST SERPL-CCNC: 29 U/L (ref 1–32)
BACTERIA UR QL AUTO: ABNORMAL /HPF
BASOPHILS # BLD AUTO: 0.07 10*3/MM3 (ref 0–0.2)
BASOPHILS NFR BLD AUTO: 0.8 % (ref 0–1.5)
BILIRUB SERPL-MCNC: 0.3 MG/DL (ref 0–1.2)
BILIRUB UR QL STRIP: NEGATIVE
BUN SERPL-MCNC: 11 MG/DL (ref 6–20)
BUN/CREAT SERPL: 19 (ref 7–25)
CALCIUM SPEC-SCNC: 9.3 MG/DL (ref 8.6–10.5)
CHLORIDE SERPL-SCNC: 103 MMOL/L (ref 98–107)
CLARITY UR: CLEAR
CO2 SERPL-SCNC: 26 MMOL/L (ref 22–29)
COLOR UR: YELLOW
CREAT SERPL-MCNC: 0.58 MG/DL (ref 0.57–1)
DEPRECATED RDW RBC AUTO: 44.6 FL (ref 37–54)
EGFRCR SERPLBLD CKD-EPI 2021: 112.5 ML/MIN/1.73
EOSINOPHIL # BLD AUTO: 0.11 10*3/MM3 (ref 0–0.4)
EOSINOPHIL NFR BLD AUTO: 1.3 % (ref 0.3–6.2)
ERYTHROCYTE [DISTWIDTH] IN BLOOD BY AUTOMATED COUNT: 13.7 % (ref 12.3–15.4)
GLOBULIN UR ELPH-MCNC: 2.6 GM/DL
GLUCOSE SERPL-MCNC: 86 MG/DL (ref 65–99)
GLUCOSE UR STRIP-MCNC: NEGATIVE MG/DL
HCT VFR BLD AUTO: 40.8 % (ref 34–46.6)
HGB BLD-MCNC: 13.1 G/DL (ref 12–15.9)
HGB UR QL STRIP.AUTO: NEGATIVE
HYALINE CASTS UR QL AUTO: ABNORMAL /LPF
IMM GRANULOCYTES # BLD AUTO: 0.03 10*3/MM3 (ref 0–0.05)
IMM GRANULOCYTES NFR BLD AUTO: 0.4 % (ref 0–0.5)
KETONES UR QL STRIP: NEGATIVE
LEUKOCYTE ESTERASE UR QL STRIP.AUTO: ABNORMAL
LYMPHOCYTES # BLD AUTO: 2.99 10*3/MM3 (ref 0.7–3.1)
LYMPHOCYTES NFR BLD AUTO: 35.8 % (ref 19.6–45.3)
MAGNESIUM SERPL-MCNC: 2 MG/DL (ref 1.6–2.6)
MCH RBC QN AUTO: 28.5 PG (ref 26.6–33)
MCHC RBC AUTO-ENTMCNC: 32.1 G/DL (ref 31.5–35.7)
MCV RBC AUTO: 88.9 FL (ref 79–97)
MONOCYTES # BLD AUTO: 0.43 10*3/MM3 (ref 0.1–0.9)
MONOCYTES NFR BLD AUTO: 5.1 % (ref 5–12)
NEUTROPHILS NFR BLD AUTO: 4.72 10*3/MM3 (ref 1.7–7)
NEUTROPHILS NFR BLD AUTO: 56.6 % (ref 42.7–76)
NITRITE UR QL STRIP: NEGATIVE
NRBC BLD AUTO-RTO: 0 /100 WBC (ref 0–0.2)
PH UR STRIP.AUTO: 5.5 [PH] (ref 5–8)
PLATELET # BLD AUTO: 335 10*3/MM3 (ref 140–450)
PMV BLD AUTO: 9.7 FL (ref 6–12)
POTASSIUM SERPL-SCNC: 4 MMOL/L (ref 3.5–5.2)
PROT SERPL-MCNC: 6.7 G/DL (ref 6–8.5)
PROT UR QL STRIP: NEGATIVE
RBC # BLD AUTO: 4.59 10*6/MM3 (ref 3.77–5.28)
RBC # UR STRIP: ABNORMAL /HPF
REF LAB TEST METHOD: ABNORMAL
SODIUM SERPL-SCNC: 140 MMOL/L (ref 136–145)
SP GR UR STRIP: 1.02 (ref 1–1.03)
SQUAMOUS #/AREA URNS HPF: ABNORMAL /HPF
UROBILINOGEN UR QL STRIP: ABNORMAL
WBC # UR STRIP: ABNORMAL /HPF
WBC NRBC COR # BLD AUTO: 8.35 10*3/MM3 (ref 3.4–10.8)

## 2025-05-01 PROCEDURE — 73502 X-RAY EXAM HIP UNI 2-3 VIEWS: CPT

## 2025-05-01 PROCEDURE — 70450 CT HEAD/BRAIN W/O DYE: CPT

## 2025-05-01 PROCEDURE — 80053 COMPREHEN METABOLIC PANEL: CPT

## 2025-05-01 PROCEDURE — 25010000002 KETOROLAC TROMETHAMINE PER 15 MG

## 2025-05-01 PROCEDURE — 96374 THER/PROPH/DIAG INJ IV PUSH: CPT

## 2025-05-01 PROCEDURE — 93005 ELECTROCARDIOGRAM TRACING: CPT

## 2025-05-01 PROCEDURE — 36415 COLL VENOUS BLD VENIPUNCTURE: CPT

## 2025-05-01 PROCEDURE — 81001 URINALYSIS AUTO W/SCOPE: CPT

## 2025-05-01 PROCEDURE — 83735 ASSAY OF MAGNESIUM: CPT

## 2025-05-01 PROCEDURE — 99284 EMERGENCY DEPT VISIT MOD MDM: CPT

## 2025-05-01 PROCEDURE — 72125 CT NECK SPINE W/O DYE: CPT

## 2025-05-01 PROCEDURE — 73030 X-RAY EXAM OF SHOULDER: CPT

## 2025-05-01 PROCEDURE — 85025 COMPLETE CBC W/AUTO DIFF WBC: CPT

## 2025-05-01 RX ORDER — LIDOCAINE 4 G/G
1 PATCH TOPICAL ONCE
Status: DISCONTINUED | OUTPATIENT
Start: 2025-05-01 | End: 2025-05-01 | Stop reason: HOSPADM

## 2025-05-01 RX ORDER — SODIUM CHLORIDE 0.9 % (FLUSH) 0.9 %
10 SYRINGE (ML) INJECTION AS NEEDED
Status: DISCONTINUED | OUTPATIENT
Start: 2025-05-01 | End: 2025-05-01 | Stop reason: HOSPADM

## 2025-05-01 RX ORDER — KETOROLAC TROMETHAMINE 15 MG/ML
15 INJECTION, SOLUTION INTRAMUSCULAR; INTRAVENOUS ONCE
Status: COMPLETED | OUTPATIENT
Start: 2025-05-01 | End: 2025-05-01

## 2025-05-01 RX ADMIN — LIDOCAINE 1 PATCH: 4 PATCH TOPICAL at 17:25

## 2025-05-01 RX ADMIN — KETOROLAC TROMETHAMINE 15 MG: 15 INJECTION, SOLUTION INTRAMUSCULAR; INTRAVENOUS at 17:24

## 2025-05-01 NOTE — DISCHARGE INSTRUCTIONS
Take ibuprofen as instructed.  Follow-up with orthopedist if needed.    Follow-up with your neurology providers, inquire about Charcot-Elsie-Tooth syndrome.

## 2025-05-01 NOTE — ED PROVIDER NOTES
Subjective   History of Present Illness  Patient is a 45-year-old female who lives independently, but had 2 falls in her home today at 10:30 AM and 1 PM.  In both instances she reports her legs gave out on her.  Her son has Charcot-Elsie-Tooth disease, and she wonders if she may also have this, reporting regular episodes approximately 3 days/week of sudden weakness with loss of gross motor strength.  Patient reports that she fell against the hallway wall the first time while using her walker to ambulate, hitting her head, shoulder and hip.  The second episode, she had stood up from the commode and fell also hitting the left side of her head, neck her left shoulder and her left hip.  She reports its painful to bear weight on her left lower extremity.  She denies loss of consciousness but endorses dizziness, headache, nausea, arthralgia.    Review of Systems   Constitutional: Negative.    Respiratory: Negative.     Cardiovascular: Negative.    Gastrointestinal: Negative.    Genitourinary: Negative.    Musculoskeletal:  Positive for arthralgias and neck pain.   Skin: Negative.    Neurological:  Positive for dizziness and headaches.       Past Medical History:   Diagnosis Date    CHF (congestive heart failure)     Depression     Elevated cholesterol     Hyperlipemia     Hypothyroid     Sleep apnea        Allergies   Allergen Reactions    Wellbutrin [Bupropion] Hives       Past Surgical History:   Procedure Laterality Date    BRAIN SURGERY      CARDIAC CATHETERIZATION N/A 8/10/2023    Procedure: Left Heart Cath;  Surgeon: Yury Barclay MD;  [facility];  Service: Cardiology;  Laterality: N/A;    DILATION AND CURETTAGE, DIAGNOSTIC / THERAPEUTIC         Family History   Adopted: Yes   Problem Relation Age of Onset    No Known Problems Mother     Dementia Father     No Known Problems Maternal Grandmother     No Known Problems Maternal Grandfather     No Known Problems Paternal Grandmother     No  Known Problems Paternal Grandfather        Social History     Socioeconomic History    Marital status:    Tobacco Use    Smoking status: Never     Passive exposure: Past    Smokeless tobacco: Never   Vaping Use    Vaping status: Never Used   Substance and Sexual Activity    Alcohol use: No    Drug use: No    Sexual activity: Yes     Partners: Male     Birth control/protection: Surgical           Objective   Physical Exam  Constitutional:       Appearance: Normal appearance. She is obese. She is not ill-appearing.   HENT:      Head: Normocephalic and atraumatic.      Right Ear: External ear normal.      Left Ear: External ear normal.   Eyes:      Extraocular Movements: Extraocular movements intact.      Conjunctiva/sclera: Conjunctivae normal.      Pupils: Pupils are equal, round, and reactive to light.   Neck:      Trachea: Trachea and phonation normal.   Cardiovascular:      Rate and Rhythm: Normal rate.      Pulses: Normal pulses.   Pulmonary:      Effort: Pulmonary effort is normal.      Breath sounds: Normal breath sounds.   Abdominal:      General: Abdomen is flat. Bowel sounds are normal.      Palpations: Abdomen is soft.   Musculoskeletal:         General: Tenderness and signs of injury present. No deformity.      Left shoulder: Tenderness and bony tenderness present. Decreased range of motion.        Arms:       Cervical back: Normal range of motion and neck supple. Tenderness present. No rigidity. Pain with movement and muscular tenderness present. No spinous process tenderness.      Left hip: Tenderness and bony tenderness present. Decreased range of motion.        Legs:    Skin:     General: Skin is warm and dry.      Capillary Refill: Capillary refill takes less than 2 seconds.   Neurological:      General: No focal deficit present.      Mental Status: She is alert and oriented to person, place, and time.         Procedures           ED Course  ED Course as of 05/01/25 1807   Thu May 01, 2025    1504 Evaluation of patient ED results waiting room 3, traveling via wheelchair. [JH]   1701 CT imaging without acute intracranial or cervical spine abnormality. [JH]   1702 Plain film imaging the patient's left hip and pelvis as well as left shoulder without obvious bony abnormality.  CMP within normal limits, likewise magnesium. [JH]   1723 Patient's twelve-lead ECG at 3:49 PM, normal sinus rhythm at 91 bpm without concerning elevation or depression in the anterior leads and my interpretation [JH]   1753 CBC without abnormality. [JH]   1802 Patient's urinalysis has resulted and nonactionable are negative. [JH]      ED Course User Index  [] Antwon Forbes, MALIKA                                                       Medical Decision Making  Given the patient's report of injury, her symptoms, her history, and my exam, differential cannot exclude bony deformity of the left hip, left shoulder, cervical spine, skull, closed head injury, intracranial abnormality, electrolyte derangement, urinary tract infection, cardiac arrhythmia.  Patient is agreeable to serum screening labs, urinalysis, plain film imaging of the left hip and pelvis, left shoulder, CT imaging head and neck, twelve-lead ECG.  We will administer antiamatory analgesia, reevaluate for improvement, communicate workup results and planning her disposition.  She has established neurologist with  and [facility] for concerns over her gross motor function and intermittent weakness.    Amount and/or Complexity of Data Reviewed  Labs: ordered.  Radiology: ordered.    Risk  OTC drugs.  Prescription drug management.        Final diagnoses:   Contusion of left hip, initial encounter   Sprain of left shoulder, unspecified shoulder sprain type, initial encounter   Neck sprain, initial encounter       ED Disposition  ED Disposition       ED Disposition   Discharge    Condition   Stable    Comment   --               Yeison Garcia,   [address] [facility]  [address]  [facility] [address]  [phone]      As needed    Fortunato Thompson MD  [facility] [place] [address] [phone]               Medication List      No changes were made to your prescriptions during this visit.            Antwon Forbes, APRN  05/01/25 1814

## 2025-05-01 NOTE — Clinical Note
[facility] EMERGENCY DEPARTMENT  [address]  [facility] [address]  Phone: [phone]    Delmi Glasgow was seen and treated in our emergency department on 5/1/2025.  She may return to work on 05/04/2025.         Thank you for choosing [facility].    Antwon Forbes, APRN

## 2025-05-02 ENCOUNTER — OFFICE VISIT (OUTPATIENT)
Dept: CARDIOLOGY | Facility: HOSPITAL | Age: 45
End: 2025-05-02
Payer: MEDICARE

## 2025-05-02 VITALS
OXYGEN SATURATION: 98 % | DIASTOLIC BLOOD PRESSURE: 77 MMHG | WEIGHT: 258.4 LBS | BODY MASS INDEX: 45.79 KG/M2 | HEIGHT: 63 IN | RESPIRATION RATE: 18 BRPM | SYSTOLIC BLOOD PRESSURE: 139 MMHG | HEART RATE: 86 BPM

## 2025-05-02 DIAGNOSIS — R42 DIZZINESS: ICD-10-CM

## 2025-05-02 DIAGNOSIS — I10 ESSENTIAL HYPERTENSION: ICD-10-CM

## 2025-05-02 DIAGNOSIS — R06.09 DOE (DYSPNEA ON EXERTION): ICD-10-CM

## 2025-05-02 DIAGNOSIS — R00.2 PALPITATIONS: ICD-10-CM

## 2025-05-02 DIAGNOSIS — R07.89 OTHER CHEST PAIN: Primary | ICD-10-CM

## 2025-05-02 RX ORDER — METOPROLOL TARTRATE 25 MG/1
TABLET, FILM COATED ORAL
Qty: 60 TABLET | Refills: 3 | Status: SHIPPED | OUTPATIENT
Start: 2025-05-02

## 2025-05-05 LAB
QT INTERVAL: 378 MS
QTC INTERVAL: 464 MS

## 2025-05-05 NOTE — PROGRESS NOTES
"Chief Complaint  Follow-up (Chest pain )    Subjective    History of Present Illness {CC  Problem List  Visit  Diagnosis   Encounters  Notes  Medications  Labs  Result Review Imaging  Media :23}       History of Present Illness   45-year-old female presents the office today for ongoing evaluation of her palpitations and chest pain. Patient presented to [facility] 3/27/2025 with chest discomfort.  Pain is located in the left side of her chest and she reports that usually happens with palpitations.  Pain is dull in nature and does not worsen with exertion.  She reports palpitations are occurring on a regular basis she often feels her heart racing.  She does not have any presyncope or syncope.  Does report shortness of air, intermittent dizziness. does take as needed meclizine.  Troponins were within normal limits .  History of dyslipidemia, palpitations, hypertension, acquired hypothyroidism, GERD, depression, heart failure with mildly reduced EF, sleep apnea, seizures.  Cardiac cath August 2023 with Dr. Moyer showed normal coronary arteries with an EF of 65%  Objective     Vital Signs:   Vitals:    05/02/25 0759   BP: 139/77   BP Location: Left arm   Patient Position: Sitting   Cuff Size: Large Adult   Pulse: 86   Resp: 18   SpO2: 98%   Weight: 117 kg (258 lb 6.4 oz)   Height: 160 cm (63\")     Body mass index is 45.77 kg/m².  Physical Exam  Vitals and nursing note reviewed.   Constitutional:       Appearance: Normal appearance.   HENT:      Head: Normocephalic.   Eyes:      Pupils: Pupils are equal, round, and reactive to light.   Cardiovascular:      Rate and Rhythm: Normal rate and regular rhythm.      Pulses: Normal pulses.      Heart sounds: Normal heart sounds. No murmur heard.  Pulmonary:      Effort: Pulmonary effort is normal.      Breath sounds: Normal breath sounds.   Abdominal:      General: Bowel sounds are normal.      Palpations: Abdomen is soft.   Musculoskeletal:         " General: Normal range of motion.      Cervical back: Normal range of motion.      Right lower leg: No edema.      Left lower leg: No edema.   Skin:     General: Skin is warm and dry.      Capillary Refill: Capillary refill takes less than 2 seconds.   Neurological:      Mental Status: She is alert and oriented to person, place, and time.   Psychiatric:         Mood and Affect: Mood normal.         Thought Content: Thought content normal.              Result Review  Data Reviewed:{ Labs  Result Review  Imaging  Med Tab  Media :23}   ECG 12 Lead Chest Pain (03/27/2025 20:59)  ECG 12 Lead Chest Pain (03/27/2025 23:16)  ECG 12 Lead Rhythm Change (03/27/2025 23:42)  Adult Transthoracic Echo Complete W/ Cont if Necessary Per Protocol (08/10/2023 10:28)  Cardiac Catheterization/Vascular Study (08/10/2023 17:49)  ECHO BI-V OPTIMIZATION (03/21/2024 07:42)      Echo 4/7/25:  Left ventricular systolic function is normal. Estimated left ventricular EF = 55%    The right ventricular cavity is borderline dilated.    Trace mitral regurgitation.    Trace tricuspid regurgitation with normal RVSP.  14-day Holter with average heart rate 91 bpm, PVC burden 5%, rare PACs and one 4 beat run of PAT.     Assessment and Plan {CC Problem List  Visit Diagnosis  ROS  Review (Popup)  Health Maintenance  Quality  BestPractice  Medications  SmartSets  SnapShot Encounters  Media :23}     1. Other chest pain  Left heart cath August 2023 showed normal coronaries; will defer stress testing at this time  Resolved     2. WYLIE (dyspnea on exertion)    Normalized EF on most recent echo  3 Essential hypertension  Lisinopril recently discontinued by neurology.    Patient to monitor BP closely        4. Palpitations  Holter did show 5% PVC burden  Patient may use metoprolol tartrate 12.5 to 25 mg p.o. twice daily as needed palpitations    5 Dizziness    Encouraged good hydration  No arrhythmias noted          Follow Up {Instructions Charge  Capture  Follow-up Communications :23}   Return in about 3 weeks (around 5/23/2025) for Telemedicine visit pvcs, HTN.    Patient was given instructions and counseling regarding her condition or for health maintenance advice. Please see specific information pulled into the AVS if appropriate.  Patient was instructed to call the [facility] with any questions, concerns, or worsening symptoms.

## 2025-05-29 ENCOUNTER — TELEMEDICINE (OUTPATIENT)
Dept: CARDIOLOGY | Facility: HOSPITAL | Age: 45
End: 2025-05-29
Payer: MEDICARE

## 2025-05-29 VITALS
BODY MASS INDEX: 45.71 KG/M2 | SYSTOLIC BLOOD PRESSURE: 113 MMHG | HEIGHT: 63 IN | DIASTOLIC BLOOD PRESSURE: 78 MMHG | WEIGHT: 258 LBS | HEART RATE: 86 BPM

## 2025-05-29 DIAGNOSIS — R06.09 DOE (DYSPNEA ON EXERTION): ICD-10-CM

## 2025-05-29 DIAGNOSIS — R00.2 PALPITATIONS: ICD-10-CM

## 2025-05-29 DIAGNOSIS — I10 ESSENTIAL HYPERTENSION: ICD-10-CM

## 2025-05-29 DIAGNOSIS — R42 DIZZINESS: ICD-10-CM

## 2025-05-29 DIAGNOSIS — R07.89 OTHER CHEST PAIN: Primary | ICD-10-CM

## 2025-05-30 NOTE — PROGRESS NOTES
Chief Complaint  Follow-up (Chest pain, dizziness )    Subjective    History of Present Illness {CC  Problem List  Visit  Diagnosis   Encounters  Notes  Medications  Labs  Result Review Imaging  Media :23}     You have chosen to receive care through the use of telemedicine. Telemedicine enables health care providers at different locations to provide safe, effective, and convenient care through the use of technology. As with any health care service, there are risks associated with the use of telemedicine, including equipment failure, poor connections, and  issues.    Do you understand the risks and benefits of telemedicine as I have explained them to you? Yes  Have your questions regarding telemedicine been answered? Yes  Do you consent to the use of telemedicine in your medical care today? Yes   History of Present Illness   45-year-old female presents for telemedicine visit today for ongoing evaluation of her palpitations and chest pain. Patient presented to [facility] 3/27/2025 with chest discomfort.  Pain is located in the left side of her chest and she reports that usually happens with palpitations.  Pain is dull in nature and does not worsen with exertion.  She reports palpitations are occurring on a regular basis she often feels her heart racing. Does report since last office visit she has taken prn metoprolol which helps with the racing heart.  She does not have any presyncope or syncope.  Does report shortness of air, intermittent dizziness. does take as needed meclizine.  Troponins were within normal limits .  History of dyslipidemia, palpitations, hypertension, acquired hypothyroidism, GERD, depression, heart failure with mildly reduced EF, sleep apnea, seizures.  Cardiac cath August 2023 with Dr. Moyer showed normal coronary arteries with an EF of 65%  Notes that she has an appt and a tilt table at [facility] next week to evaluate her for POTS    Objective   "   Vital Signs:   Vitals:    05/29/25 1308   BP: 113/78   BP Location: Left arm   Patient Position: Sitting   Pulse: 86   Weight: 117 kg (258 lb)   Height: 160 cm (63\")     Body mass index is 45.7 kg/m².  Physical Exam  Vitals and nursing note reviewed.   Constitutional:       Appearance: Normal appearance.   HENT:      Head: Normocephalic.   Pulmonary:      Effort: Pulmonary effort is normal.   Neurological:      Mental Status: She is alert and oriented to person, place, and time.   Psychiatric:         Mood and Affect: Mood normal.         Behavior: Behavior normal.         Thought Content: Thought content normal.              Result Review  Data Reviewed:{ Labs  Result Review  Imaging  Med Tab  Media :23}   ECG 12 Lead Chest Pain (03/27/2025 20:59)  ECG 12 Lead Chest Pain (03/27/2025 23:16)  ECG 12 Lead Rhythm Change (03/27/2025 23:42)  Adult Transthoracic Echo Complete W/ Cont if Necessary Per Protocol (08/10/2023 10:28)  Cardiac Catheterization/Vascular Study (08/10/2023 17:49)  ECHO BI-V OPTIMIZATION (03/21/2024 07:42)      Echo 4/7/25:  Left ventricular systolic function is normal. Estimated left ventricular EF = 55%    The right ventricular cavity is borderline dilated.    Trace mitral regurgitation.    Trace tricuspid regurgitation with normal RVSP.  14-day Holter with average heart rate 91 bpm, PVC burden 5%, rare PACs and one 4 beat run of PAT.     Assessment and Plan {CC Problem List  Visit Diagnosis  ROS  Review (Popup)  Health Maintenance  Quality  BestPractice  Medications  SmartSets  SnapShot Encounters  Media :23}     1. Other chest pain  Left heart cath August 2023 showed normal coronaries; will defer stress testing at this time  Resolved     2. WYLIE (dyspnea on exertion)    Normalized EF on most recent echo  3 Essential hypertension  Lisinopril recently discontinued by neurology.    Patient to monitor BP closely    4. Palpitations  Holter did show 5% PVC burden  Patient may use " metoprolol tartrate 12.5 to 25 mg p.o. twice daily as needed palpitations    5 Dizziness    Encouraged good hydration  No arrhythmias noted  Is undergoing evaluation at [facility] for POTS        Follow Up {Instructions Charge Capture  Follow-up Communications :23}   Return if symptoms worsen or fail to improve.    Patient was given instructions and counseling regarding her condition or for health maintenance advice. Please see specific information pulled into the AVS if appropriate.  Patient was instructed to call the [facility] with any questions, concerns, or worsening symptoms.

## 2025-08-12 ENCOUNTER — APPOINTMENT (OUTPATIENT)
Dept: CT IMAGING | Facility: HOSPITAL | Age: 45
End: 2025-08-12
Payer: MEDICARE

## 2025-08-12 ENCOUNTER — APPOINTMENT (OUTPATIENT)
Dept: GENERAL RADIOLOGY | Facility: HOSPITAL | Age: 45
End: 2025-08-12
Payer: MEDICARE

## 2025-08-12 ENCOUNTER — HOSPITAL ENCOUNTER (EMERGENCY)
Facility: HOSPITAL | Age: 45
Discharge: HOME | End: 2025-08-13
Attending: EMERGENCY MEDICINE
Payer: MEDICARE

## 2025-08-12 DIAGNOSIS — Z87.898 HISTORY OF VERTIGO: ICD-10-CM

## 2025-08-12 DIAGNOSIS — H93.8X1 EAR PRESSURE, RIGHT: ICD-10-CM

## 2025-08-12 DIAGNOSIS — Z86.39 HISTORY OF HYPERLIPIDEMIA: ICD-10-CM

## 2025-08-12 DIAGNOSIS — R26.89 IMBALANCE: ICD-10-CM

## 2025-08-12 DIAGNOSIS — Z87.898 HISTORY OF PITUITARY TUMOR: ICD-10-CM

## 2025-08-12 DIAGNOSIS — R42 DIZZINESS: Primary | ICD-10-CM

## 2025-08-12 DIAGNOSIS — Z86.79 HISTORY OF CHF (CONGESTIVE HEART FAILURE): ICD-10-CM

## 2025-08-12 DIAGNOSIS — Z86.69 HISTORY OF SLEEP APNEA: ICD-10-CM

## 2025-08-12 DIAGNOSIS — R11.2 NAUSEA AND VOMITING, UNSPECIFIED VOMITING TYPE: ICD-10-CM

## 2025-08-12 DIAGNOSIS — R41.3 MEMORY DISTURBANCE: ICD-10-CM

## 2025-08-12 LAB
ALBUMIN SERPL-MCNC: 4.3 G/DL (ref 3.5–5.2)
ALBUMIN/GLOB SERPL: 1.5 G/DL
ALP SERPL-CCNC: 155 U/L (ref 39–117)
ALT SERPL W P-5'-P-CCNC: 32 U/L (ref 1–33)
ANION GAP SERPL CALCULATED.3IONS-SCNC: 11 MMOL/L (ref 5–15)
AST SERPL-CCNC: 43 U/L (ref 1–32)
BACTERIA UR QL AUTO: ABNORMAL /HPF
BASOPHILS # BLD AUTO: 0.08 10*3/MM3 (ref 0–0.2)
BASOPHILS NFR BLD AUTO: 0.8 % (ref 0–1.5)
BILIRUB SERPL-MCNC: 0.2 MG/DL (ref 0–1.2)
BILIRUB UR QL STRIP: NEGATIVE
BUN SERPL-MCNC: 9.8 MG/DL (ref 6–20)
BUN/CREAT SERPL: 16.3 (ref 7–25)
CALCIUM SPEC-SCNC: 9.8 MG/DL (ref 8.6–10.5)
CHLORIDE SERPL-SCNC: 102 MMOL/L (ref 98–107)
CLARITY UR: CLEAR
CO2 SERPL-SCNC: 26 MMOL/L (ref 22–29)
COLOR UR: YELLOW
CREAT SERPL-MCNC: 0.6 MG/DL (ref 0.57–1)
DEPRECATED RDW RBC AUTO: 43.5 FL (ref 37–54)
EGFRCR SERPLBLD CKD-EPI 2021: 111.6 ML/MIN/1.73
EOSINOPHIL # BLD AUTO: 0.17 10*3/MM3 (ref 0–0.4)
EOSINOPHIL NFR BLD AUTO: 1.7 % (ref 0.3–6.2)
ERYTHROCYTE [DISTWIDTH] IN BLOOD BY AUTOMATED COUNT: 13.4 % (ref 12.3–15.4)
GLOBULIN UR ELPH-MCNC: 2.8 GM/DL
GLUCOSE SERPL-MCNC: 75 MG/DL (ref 65–99)
GLUCOSE UR STRIP-MCNC: NEGATIVE MG/DL
HCT VFR BLD AUTO: 41 % (ref 34–46.6)
HGB BLD-MCNC: 13.2 G/DL (ref 12–15.9)
HGB UR QL STRIP.AUTO: NEGATIVE
HOLD SPECIMEN: NORMAL
HYALINE CASTS UR QL AUTO: ABNORMAL /LPF
IMM GRANULOCYTES # BLD AUTO: 0.05 10*3/MM3 (ref 0–0.05)
IMM GRANULOCYTES NFR BLD AUTO: 0.5 % (ref 0–0.5)
KETONES UR QL STRIP: NEGATIVE
LEUKOCYTE ESTERASE UR QL STRIP.AUTO: ABNORMAL
LYMPHOCYTES # BLD AUTO: 3.97 10*3/MM3 (ref 0.7–3.1)
LYMPHOCYTES NFR BLD AUTO: 39.9 % (ref 19.6–45.3)
MAGNESIUM SERPL-MCNC: 2.2 MG/DL (ref 1.6–2.6)
MCH RBC QN AUTO: 28.4 PG (ref 26.6–33)
MCHC RBC AUTO-ENTMCNC: 32.2 G/DL (ref 31.5–35.7)
MCV RBC AUTO: 88.4 FL (ref 79–97)
MONOCYTES # BLD AUTO: 0.69 10*3/MM3 (ref 0.1–0.9)
MONOCYTES NFR BLD AUTO: 6.9 % (ref 5–12)
NEUTROPHILS NFR BLD AUTO: 4.99 10*3/MM3 (ref 1.7–7)
NEUTROPHILS NFR BLD AUTO: 50.2 % (ref 42.7–76)
NITRITE UR QL STRIP: NEGATIVE
NRBC BLD AUTO-RTO: 0 /100 WBC (ref 0–0.2)
PH UR STRIP.AUTO: 7 [PH] (ref 5–8)
PLATELET # BLD AUTO: 349 10*3/MM3 (ref 140–450)
PMV BLD AUTO: 10.2 FL (ref 6–12)
POTASSIUM SERPL-SCNC: 4.8 MMOL/L (ref 3.5–5.2)
PROT SERPL-MCNC: 7.1 G/DL (ref 6–8.5)
PROT UR QL STRIP: ABNORMAL
RBC # BLD AUTO: 4.64 10*6/MM3 (ref 3.77–5.28)
RBC # UR STRIP: ABNORMAL /HPF
REF LAB TEST METHOD: ABNORMAL
SODIUM SERPL-SCNC: 139 MMOL/L (ref 136–145)
SP GR UR STRIP: 1.02 (ref 1–1.03)
SQUAMOUS #/AREA URNS HPF: ABNORMAL /HPF
TROPONIN T SERPL HS-MCNC: 8 NG/L
UROBILINOGEN UR QL STRIP: ABNORMAL
WBC # UR STRIP: ABNORMAL /HPF
WBC NRBC COR # BLD AUTO: 9.95 10*3/MM3 (ref 3.4–10.8)
WHOLE BLOOD HOLD COAG: NORMAL
WHOLE BLOOD HOLD SPECIMEN: NORMAL

## 2025-08-12 PROCEDURE — 96375 TX/PRO/DX INJ NEW DRUG ADDON: CPT

## 2025-08-12 PROCEDURE — 93005 ELECTROCARDIOGRAM TRACING: CPT | Performed by: EMERGENCY MEDICINE

## 2025-08-12 PROCEDURE — 71045 X-RAY EXAM CHEST 1 VIEW: CPT

## 2025-08-12 PROCEDURE — 99284 EMERGENCY DEPT VISIT MOD MDM: CPT

## 2025-08-12 PROCEDURE — 96374 THER/PROPH/DIAG INJ IV PUSH: CPT

## 2025-08-12 PROCEDURE — 85025 COMPLETE CBC W/AUTO DIFF WBC: CPT | Performed by: EMERGENCY MEDICINE

## 2025-08-12 PROCEDURE — 81001 URINALYSIS AUTO W/SCOPE: CPT | Performed by: EMERGENCY MEDICINE

## 2025-08-12 PROCEDURE — 84484 ASSAY OF TROPONIN QUANT: CPT | Performed by: EMERGENCY MEDICINE

## 2025-08-12 PROCEDURE — 25010000002 KETOROLAC TROMETHAMINE PER 15 MG: Performed by: PHYSICIAN ASSISTANT

## 2025-08-12 PROCEDURE — 80053 COMPREHEN METABOLIC PANEL: CPT | Performed by: EMERGENCY MEDICINE

## 2025-08-12 PROCEDURE — 0202U NFCT DS 22 TRGT SARS-COV-2: CPT | Performed by: PHYSICIAN ASSISTANT

## 2025-08-12 PROCEDURE — 83735 ASSAY OF MAGNESIUM: CPT | Performed by: EMERGENCY MEDICINE

## 2025-08-12 PROCEDURE — 25810000003 SODIUM CHLORIDE 0.9 % SOLUTION: Performed by: PHYSICIAN ASSISTANT

## 2025-08-12 PROCEDURE — 25010000002 METOCLOPRAMIDE PER 10 MG: Performed by: PHYSICIAN ASSISTANT

## 2025-08-12 PROCEDURE — 93005 ELECTROCARDIOGRAM TRACING: CPT

## 2025-08-12 PROCEDURE — 70450 CT HEAD/BRAIN W/O DYE: CPT

## 2025-08-12 PROCEDURE — 25010000002 DEXAMETHASONE PER 1 MG: Performed by: PHYSICIAN ASSISTANT

## 2025-08-12 RX ORDER — SODIUM CHLORIDE 0.9 % (FLUSH) 0.9 %
10 SYRINGE (ML) INJECTION AS NEEDED
Status: DISCONTINUED | OUTPATIENT
Start: 2025-08-12 | End: 2025-08-13 | Stop reason: HOSPADM

## 2025-08-12 RX ORDER — METOCLOPRAMIDE HYDROCHLORIDE 5 MG/ML
10 INJECTION INTRAMUSCULAR; INTRAVENOUS ONCE
Status: COMPLETED | OUTPATIENT
Start: 2025-08-12 | End: 2025-08-12

## 2025-08-12 RX ORDER — KETOROLAC TROMETHAMINE 15 MG/ML
15 INJECTION, SOLUTION INTRAMUSCULAR; INTRAVENOUS ONCE
Status: COMPLETED | OUTPATIENT
Start: 2025-08-12 | End: 2025-08-12

## 2025-08-12 RX ORDER — DEXAMETHASONE SODIUM PHOSPHATE 10 MG/ML
8 INJECTION, SOLUTION INTRA-ARTICULAR; INTRALESIONAL; INTRAMUSCULAR; INTRAVENOUS; SOFT TISSUE ONCE
Status: COMPLETED | OUTPATIENT
Start: 2025-08-12 | End: 2025-08-12

## 2025-08-12 RX ADMIN — METOCLOPRAMIDE 10 MG: 5 INJECTION, SOLUTION INTRAMUSCULAR; INTRAVENOUS at 23:21

## 2025-08-12 RX ADMIN — DEXAMETHASONE SODIUM PHOSPHATE 8 MG: 10 INJECTION INTRAMUSCULAR; INTRAVENOUS at 23:23

## 2025-08-12 RX ADMIN — SODIUM CHLORIDE 1000 ML: 9 INJECTION, SOLUTION INTRAVENOUS at 23:21

## 2025-08-12 RX ADMIN — KETOROLAC TROMETHAMINE 15 MG: 15 INJECTION, SOLUTION INTRAMUSCULAR; INTRAVENOUS at 23:22

## 2025-08-13 VITALS
HEART RATE: 72 BPM | SYSTOLIC BLOOD PRESSURE: 122 MMHG | RESPIRATION RATE: 18 BRPM | BODY MASS INDEX: 44.3 KG/M2 | TEMPERATURE: 98 F | DIASTOLIC BLOOD PRESSURE: 88 MMHG | WEIGHT: 250 LBS | OXYGEN SATURATION: 94 % | HEIGHT: 63 IN

## 2025-08-13 LAB
B PARAPERT DNA SPEC QL NAA+PROBE: NOT DETECTED
B PERT DNA SPEC QL NAA+PROBE: NOT DETECTED
C PNEUM DNA NPH QL NAA+NON-PROBE: NOT DETECTED
FLUAV SUBTYP SPEC NAA+PROBE: NOT DETECTED
FLUBV RNA NPH QL NAA+NON-PROBE: NOT DETECTED
HADV DNA SPEC NAA+PROBE: NOT DETECTED
HCOV 229E RNA SPEC QL NAA+PROBE: NOT DETECTED
HCOV HKU1 RNA SPEC QL NAA+PROBE: NOT DETECTED
HCOV NL63 RNA SPEC QL NAA+PROBE: NOT DETECTED
HCOV OC43 RNA SPEC QL NAA+PROBE: NOT DETECTED
HMPV RNA NPH QL NAA+NON-PROBE: NOT DETECTED
HPIV1 RNA ISLT QL NAA+PROBE: NOT DETECTED
HPIV2 RNA SPEC QL NAA+PROBE: NOT DETECTED
HPIV3 RNA NPH QL NAA+PROBE: NOT DETECTED
HPIV4 P GENE NPH QL NAA+PROBE: NOT DETECTED
M PNEUMO IGG SER IA-ACNC: NOT DETECTED
RHINOVIRUS RNA SPEC NAA+PROBE: NOT DETECTED
RSV RNA NPH QL NAA+NON-PROBE: NOT DETECTED
SARS-COV-2 RNA RESP QL NAA+PROBE: NOT DETECTED

## 2025-08-13 RX ORDER — ONDANSETRON 4 MG/1
4 TABLET, ORALLY DISINTEGRATING ORAL EVERY 4 HOURS
Qty: 12 TABLET | Refills: 0 | Status: SHIPPED | OUTPATIENT
Start: 2025-08-13

## 2025-08-16 LAB
QT INTERVAL: 408 MS
QTC INTERVAL: 473 MS

## (undated) DEVICE — MODEL AT P65, P/N 701554-001KIT CONTENTS: HAND CONTROLLER, 3-WAY HIGH-PRESSURE STOPCOCK WITH ROTATING END AND PREMIUM HIGH-PRESSURE TUBING: Brand: ANGIOTOUCH® KIT

## (undated) DEVICE — ADULT, W/LG. BACK PAD, RADIOTRANSPARENT ELEMENT AND LEAD WIRE: Brand: DEFIBRILLATION ELECTRODES

## (undated) DEVICE — DEV COMP RAD PRELUDESYNC 24CM

## (undated) DEVICE — INTRO SHEATH PRELUDE IDEAL SPRNG COIL 021 6F 23X80CM

## (undated) DEVICE — PK CATH CARD 10

## (undated) DEVICE — CATH DIAG EXPO .045 FL3  5F 100CM

## (undated) DEVICE — GW INQWIRE FC PTFE STD J/1.5 .035 260

## (undated) DEVICE — ST EXT IV SMRTSTE 2VLV FIX M LL 6ML 41

## (undated) DEVICE — CATH DIAG EXPO M/ PK 5F FL4/FR4 PIG

## (undated) DEVICE — MODEL BT2000 P/N 700287-012KIT CONTENTS: MANIFOLD WITH SALINE AND CONTRAST PORTS, SALINE TUBING WITH SPIKE AND HAND SYRINGE, TRANSDUCER: Brand: BT2000 AUTOMATED MANIFOLD KIT

## (undated) DEVICE — ST INF PRI SMRTSTE 20DRP 2VLV 24ML 117